# Patient Record
Sex: MALE | Race: WHITE | URBAN - METROPOLITAN AREA
[De-identification: names, ages, dates, MRNs, and addresses within clinical notes are randomized per-mention and may not be internally consistent; named-entity substitution may affect disease eponyms.]

---

## 2022-05-12 ENCOUNTER — OFFICE VISIT (OUTPATIENT)
Dept: FAMILY MEDICINE CLINIC | Age: 67
End: 2022-05-12
Payer: COMMERCIAL

## 2022-05-12 VITALS
HEART RATE: 60 BPM | OXYGEN SATURATION: 97 % | DIASTOLIC BLOOD PRESSURE: 80 MMHG | HEIGHT: 71 IN | WEIGHT: 268.4 LBS | SYSTOLIC BLOOD PRESSURE: 126 MMHG | BODY MASS INDEX: 37.57 KG/M2 | TEMPERATURE: 97.6 F

## 2022-05-12 DIAGNOSIS — I48.21 PERMANENT ATRIAL FIBRILLATION (HCC): ICD-10-CM

## 2022-05-12 DIAGNOSIS — Z79.01 LONG TERM CURRENT USE OF ANTICOAGULANT: ICD-10-CM

## 2022-05-12 DIAGNOSIS — Z00.00 ANNUAL PHYSICAL EXAM: ICD-10-CM

## 2022-05-12 DIAGNOSIS — Z12.5 PROSTATE CANCER SCREENING: ICD-10-CM

## 2022-05-12 DIAGNOSIS — R39.11 BENIGN PROSTATIC HYPERPLASIA WITH URINARY HESITANCY: ICD-10-CM

## 2022-05-12 DIAGNOSIS — N40.1 BENIGN PROSTATIC HYPERPLASIA WITH URINARY HESITANCY: ICD-10-CM

## 2022-05-12 DIAGNOSIS — E55.9 HYPOVITAMINOSIS D: ICD-10-CM

## 2022-05-12 DIAGNOSIS — Z00.00 ANNUAL PHYSICAL EXAM: Primary | ICD-10-CM

## 2022-05-12 DIAGNOSIS — R73.03 PRE-DIABETES: ICD-10-CM

## 2022-05-12 DIAGNOSIS — N39.41 URGE INCONTINENCE OF URINE: ICD-10-CM

## 2022-05-12 LAB
A/G RATIO: 1.4 (ref 1.1–2.2)
ALBUMIN SERPL-MCNC: 4.2 G/DL (ref 3.4–5)
ALP BLD-CCNC: 52 U/L (ref 40–129)
ALT SERPL-CCNC: 11 U/L (ref 10–40)
ANION GAP SERPL CALCULATED.3IONS-SCNC: 11 MMOL/L (ref 3–16)
AST SERPL-CCNC: 14 U/L (ref 15–37)
BASOPHILS ABSOLUTE: 0.1 K/UL (ref 0–0.2)
BASOPHILS RELATIVE PERCENT: 1 %
BILIRUB SERPL-MCNC: 0.6 MG/DL (ref 0–1)
BUN BLDV-MCNC: 16 MG/DL (ref 7–20)
CALCIUM SERPL-MCNC: 9.1 MG/DL (ref 8.3–10.6)
CHLORIDE BLD-SCNC: 104 MMOL/L (ref 99–110)
CHOLESTEROL, FASTING: 155 MG/DL (ref 0–199)
CO2: 23 MMOL/L (ref 21–32)
CREAT SERPL-MCNC: 0.9 MG/DL (ref 0.8–1.3)
EOSINOPHILS ABSOLUTE: 0.2 K/UL (ref 0–0.6)
EOSINOPHILS RELATIVE PERCENT: 3 %
GFR AFRICAN AMERICAN: >60
GFR NON-AFRICAN AMERICAN: >60
GLUCOSE BLD-MCNC: 91 MG/DL (ref 70–99)
HCT VFR BLD CALC: 40.1 % (ref 40.5–52.5)
HDLC SERPL-MCNC: 41 MG/DL (ref 40–60)
HEMOGLOBIN: 13.6 G/DL (ref 13.5–17.5)
LDL CHOLESTEROL CALCULATED: 98 MG/DL
LYMPHOCYTES ABSOLUTE: 1.4 K/UL (ref 1–5.1)
LYMPHOCYTES RELATIVE PERCENT: 22.2 %
MCH RBC QN AUTO: 31.1 PG (ref 26–34)
MCHC RBC AUTO-ENTMCNC: 33.9 G/DL (ref 31–36)
MCV RBC AUTO: 91.7 FL (ref 80–100)
MONOCYTES ABSOLUTE: 0.4 K/UL (ref 0–1.3)
MONOCYTES RELATIVE PERCENT: 7 %
NEUTROPHILS ABSOLUTE: 4.3 K/UL (ref 1.7–7.7)
NEUTROPHILS RELATIVE PERCENT: 66.8 %
PDW BLD-RTO: 13.9 % (ref 12.4–15.4)
PLATELET # BLD: 222 K/UL (ref 135–450)
PMV BLD AUTO: 9 FL (ref 5–10.5)
POTASSIUM SERPL-SCNC: 4.4 MMOL/L (ref 3.5–5.1)
RBC # BLD: 4.37 M/UL (ref 4.2–5.9)
SODIUM BLD-SCNC: 138 MMOL/L (ref 136–145)
TOTAL PROTEIN: 7.1 G/DL (ref 6.4–8.2)
TRIGLYCERIDE, FASTING: 80 MG/DL (ref 0–150)
VLDLC SERPL CALC-MCNC: 16 MG/DL
WBC # BLD: 6.4 K/UL (ref 4–11)

## 2022-05-12 PROCEDURE — 99387 INIT PM E/M NEW PAT 65+ YRS: CPT | Performed by: FAMILY MEDICINE

## 2022-05-12 RX ORDER — METOPROLOL SUCCINATE 50 MG/1
50 TABLET, EXTENDED RELEASE ORAL DAILY
COMMUNITY

## 2022-05-12 RX ORDER — PROPAFENONE HYDROCHLORIDE 150 MG/1
150 TABLET, FILM COATED ORAL EVERY 8 HOURS
COMMUNITY

## 2022-05-12 RX ORDER — TAMSULOSIN HYDROCHLORIDE 0.4 MG/1
0.4 CAPSULE ORAL DAILY
Qty: 90 CAPSULE | Refills: 1 | Status: SHIPPED | OUTPATIENT
Start: 2022-05-12

## 2022-05-12 RX ORDER — OXYBUTYNIN CHLORIDE 5 MG/1
5 TABLET, EXTENDED RELEASE ORAL DAILY
Qty: 90 TABLET | Refills: 1 | Status: SHIPPED | OUTPATIENT
Start: 2022-05-12

## 2022-05-12 RX ORDER — TAMSULOSIN HYDROCHLORIDE 0.4 MG/1
0.4 CAPSULE ORAL DAILY
COMMUNITY
End: 2022-05-12 | Stop reason: SDUPTHER

## 2022-05-12 RX ORDER — OXYBUTYNIN CHLORIDE 5 MG/1
5 TABLET ORAL 3 TIMES DAILY
COMMUNITY
End: 2022-05-12

## 2022-05-12 RX ORDER — OXYBUTYNIN CHLORIDE 5 MG/1
1 TABLET, EXTENDED RELEASE ORAL DAILY
COMMUNITY
Start: 2022-03-12 | End: 2022-05-12 | Stop reason: SDUPTHER

## 2022-05-12 SDOH — ECONOMIC STABILITY: FOOD INSECURITY: WITHIN THE PAST 12 MONTHS, YOU WORRIED THAT YOUR FOOD WOULD RUN OUT BEFORE YOU GOT MONEY TO BUY MORE.: NEVER TRUE

## 2022-05-12 SDOH — ECONOMIC STABILITY: FOOD INSECURITY: WITHIN THE PAST 12 MONTHS, THE FOOD YOU BOUGHT JUST DIDN'T LAST AND YOU DIDN'T HAVE MONEY TO GET MORE.: NEVER TRUE

## 2022-05-12 ASSESSMENT — PATIENT HEALTH QUESTIONNAIRE - PHQ9
SUM OF ALL RESPONSES TO PHQ QUESTIONS 1-9: 0
1. LITTLE INTEREST OR PLEASURE IN DOING THINGS: 0
SUM OF ALL RESPONSES TO PHQ9 QUESTIONS 1 & 2: 0
2. FEELING DOWN, DEPRESSED OR HOPELESS: 0

## 2022-05-12 ASSESSMENT — SOCIAL DETERMINANTS OF HEALTH (SDOH): HOW HARD IS IT FOR YOU TO PAY FOR THE VERY BASICS LIKE FOOD, HOUSING, MEDICAL CARE, AND HEATING?: NOT HARD AT ALL

## 2022-05-12 NOTE — PATIENT INSTRUCTIONS
Patient Education        Atrial Fibrillation: Care Instructions  Your Care Instructions     Atrial fibrillation is an irregular and often fast heartbeat. Treating this condition is important for several reasons. It can cause blood clots, which can travel from your heart to your brain and cause a stroke. If you have a fast heartbeat, you may feel lightheaded, dizzy, and weak. An irregular heartbeatcan also increase your risk for heart failure. Atrial fibrillation is often the result of another heart condition, such as high blood pressure or coronary artery disease. Making changes to improve yourheart condition will help you stay healthy and active. Follow-up care is a key part of your treatment and safety. Be sure to make and go to all appointments, and call your doctor if you are having problems. It's also a good idea to know your test results and keep alist of the medicines you take. How can you care for yourself at home? Medicines     Take your medicines exactly as prescribed. Call your doctor if you think you are having a problem with your medicine. You will get more details on the specific medicines your doctor prescribes.      If your doctor has given you a blood thinner to prevent a stroke, be sure you get instructions about how to take your medicine safely. Blood thinners can cause serious bleeding problems.      Do not take any vitamins, over-the-counter drugs, or herbal products without talking to your doctor first.   Lifestyle changes     Do not smoke. Smoking can increase your chance of a stroke and heart attack. If you need help quitting, talk to your doctor about stop-smoking programs and medicines. These can increase your chances of quitting for good.      Eat a heart-healthy diet.      Stay at a healthy weight. Lose weight if you need to.      Limit alcohol to 2 drinks a day for men and 1 drink a day for women. Too much alcohol can cause health problems.      Avoid colds and flu.  Get a pneumococcal vaccine shot. If you have had one before, ask your doctor whether you need another dose. Get a flu shot every year. If you must be around people with colds or flu, wash your hands often. Activity     If your doctor recommends it, get more exercise. Walking is a good choice. Bit by bit, increase the amount you walk every day. Try for at least 30 minutes on most days of the week. You also may want to swim, bike, or do other activities. Your doctor may suggest that you join a cardiac rehabilitation program so that you can have help increasing your physical activity safely.      Start light exercise if your doctor says it is okay. Even a small amount will help you get stronger, have more energy, and manage stress. Walking is an easy way to get exercise. Start out by walking a little more than you did in the hospital. Gradually increase the amount you walk.      When you exercise, watch for signs that your heart is working too hard. You are pushing too hard if you cannot talk while you are exercising. If you become short of breath or dizzy or have chest pain, sit down and rest immediately.      Check your pulse regularly. Place two fingers on the artery at the palm side of your wrist, in line with your thumb. If your heartbeat seems uneven or fast, talk to your doctor. When should you call for help? Call 911 anytime you think you may need emergency care. For example, call if:     You have symptoms of a heart attack. These may include:  ? Chest pain or pressure, or a strange feeling in the chest.  ? Sweating. ? Shortness of breath. ? Nausea or vomiting. ? Pain, pressure, or a strange feeling in the back, neck, jaw, or upper belly or in one or both shoulders or arms. ? Lightheadedness or sudden weakness. ? A fast or irregular heartbeat. After you call 911, the  may tell you to chew 1 adult-strength or 2 to 4 low-dose aspirin. Wait for an ambulance.  Do not try to drive yourself.      You have symptoms of a stroke. These may include:  ? Sudden numbness, tingling, weakness, or loss of movement in your face, arm, or leg, especially on only one side of your body. ? Sudden vision changes. ? Sudden trouble speaking. ? Sudden confusion or trouble understanding simple statements. ? Sudden problems with walking or balance. ? A sudden, severe headache that is different from past headaches.      You passed out (lost consciousness). Call your doctor now or seek immediate medical care if:     You have new or increased shortness of breath.      You feel dizzy or lightheaded, or you feel like you may faint.      Your heart rate becomes irregular.      You can feel your heart flutter in your chest or skip heartbeats. Tell your doctor if these symptoms are new or worse. Watch closely for changes in your health, and be sure to contact your doctor ifyou have any problems. Where can you learn more? Go to [email or web address] and sign in to your [employer] account. Enter U020 in the Soc[employer] box to learn more about \"Atrial Fibrillation: Care Instructions. \"     If you do not have an account, please click on the \"Sign Up Now\" link. Current as of: January 10, 2022               Content Version: 13.2  © 2006-2022 Healthwise, Incorporated. Care instructions adapted under license by [facility] ([facility]). If you have questions about a medical condition or this instruction, always ask your healthcare professional. [place] [address] any warranty or liability for your use of this information.

## 2022-05-12 NOTE — PROGRESS NOTES
Subjective    Erin Mars is a 77 y.o. Male who came into the clinic today to establish care with me and for appropriate management. Since I am seeing the patient for the first time today I did review in detail his medical history along with the medications   the patient is currently on. The patient informs me that he has been taking his medications as prescribed and has not   noticed any side effects from the same. The patient has permanent atrial fibrillation and is on medication for the same. The patient would like to be referred to a cardiologist closer in the area since his previous cardiologist office is at a far   distance from his home. The patient also wanted to have his blood work done since it has been a while it has been   checked. I also reviewed the blood work done for the patient by his previous PCP office. The patient's HbA1c was found   to be in the prediabetic range of 5.8. The patient would like to have it rechecked at this time. The patient also informs me   that he has hypovitaminosis D and would like to have his vitamin D levels checked. The patient did bring in a physician   results form as a part of well works /annual physical for his health insurance from his workplace. I advised the patient   that we will fill the form and will send it to appropriate authorities. The patient verbalized understanding and agreed to the   plan. Otherwise today he did not have any other questions or concerns and all the question and concerns were appropriately   answered. The patient's most recent colonoscopy as per the patient was 4 years back and it was within normal limits.     Past Medical History:   Diagnosis Date    Atrial fibrillation (HCC)     BPH (benign prostatic hyperplasia)     Urge incontinence        Patient Active Problem List   Diagnosis    Permanent atrial fibrillation ([address]    Benign prostatic hyperplasia with urinary hesitancy    Urge incontinence of urine    Hypovitaminosis D       Past Surgical History:   Procedure Laterality Date    ACHILLES TENDON SURGERY      CERVICAL SPINE SURGERY         Family History   Problem Relation Age of Onset    No Known Problems Mother     No Known Problems Father        Social History     Tobacco Use    Smoking status: Never Smoker    Smokeless tobacco: Never Used   Substance Use Topics    Alcohol use: Yes     Comment: occasional       Current Outpatient Medications on File Prior to Visit   Medication Sig Dispense Refill    metoprolol succinate (TOPROL XL) 50 MG extended release tablet Take 50 mg by mouth daily      rivaroxaban (XARELTO) 20 MG TABS tablet Take 20 mg by mouth      propafenone (RYTHMOL) 150 MG tablet Take 150 mg by mouth every 8 hours       No current facility-administered medications on file prior to visit. No Known Allergies    REVIEW OF SYSTEMS:   CONSTITUTIONAL: No weight loss, fever, chills, weakness or fatigue. HEENT: Eyes: No visual loss, blurred vision, double vision or yellow sclerae. Ears, Nose, Throat: No hearing loss, sneezing, congestion, runny nose or sore throat. SKIN: No rash or itching. CARDIOVASCULAR: No chest pain, chest pressure or chest discomfort. No palpitations. Complains of edema. RESPIRATORY: No shortness of breath, cough or sputum. GASTROINTESTINAL: No anorexia, nausea, vomiting, diarrhea or constipation. No abdominal pain, hematochezia or melena. GENITOURINARY:No dysuria, frequency, hematuria. Complains of urgency and incontinence. NEUROLOGICAL: No headache, dizziness, syncope, paralysis, ataxia,   numbness or tingling in the extremities. No change in bowel or bladder control. MUSCULOSKELETAL: No muscle pain, back pain, joint pain or stiffness. PSYCHIATRIC: No history of depression or anxiety. ENDOCRINOLOGIC: No reports of sweating, cold or heat intolerance. No polyuria or polydipsia. Objective     . /80   Pulse 60   Temp 97.6 °F (36.4 °C) (Temporal)   Ht 5' 11\" (1.803 m)   Wt 268 lb 6.4 oz (121.7 kg)   SpO2 97%   BMI 37.43 kg/m²     GENERAL:  Markus Mehta is a 77 y.o.  male who is not in any acute distress. He was well attired and well groomed and was speaking in full sentences. HEENT:  Head:  Atraumatic, normocephalic. Eyes: Both the pupils are round,   equal and reactive to light. No squint noted. Normal red reflex is seen. Ears: Both external auditory canals are clear. No discharge noted. Tympanic membranes healthy. Normal light reflex is seen. Nose: Both the   nares are clear. No discharge noted. No epistaxis. Throat:  No posterior   pharyngeal wall erythema. Oral mucosa moist. No enlarged tonsils. NECK:  Supple. No cervical lymphadenopathy. No thyromegaly. LUNGS:  Normal ventilatory breath sounds are heard bilaterally. No crackles   or wheezes heard. CARDIOVASCULAR: Irregularly irregular rhythm. Normal S1, S2 heard. No murmurs heard. No JVD. GASTROINTESTINAL:  Abdomen soft, nontender. No guarding or rigidity noted. No organomegaly noted. Normal bowel sounds were heard. EXTREMITIES:  All 4 extremities were moving fine. Full range of motion is   present. No deformity noted. Peripheral pulses were felt. Bilateral 1+ lower   extremity edema. Neurovascular integrity maintained. NEUROLOGICAL:  The patient was alert, conscious, and cooperative. Oriented   to time, place, and person. Muscle power in all 4 limbs is 5/5. Cranial   nerves II thru XII are grossly intact. No sensory or motor loss. PSYCHIATRIC:  Appropriate mood and affect. No flights of ideas or thoughts. Intact recent and past memory. No confusion or delirium. No suicidal or homicidal ideations. BACK:  Normal alignment of the spine. Full range of motion is present. No   stepoff. No deformity. No spinal or paraspinal tenderness noted.     Assessment/Plan     Diagnoses and all orders for this visit:    Annual physical exam  -     CBC with Auto Differential; Future  -     Comprehensive Metabolic Panel; Future  -     Lipid, Fasting; Future    Permanent atrial fibrillation (HCC)  -     Nicola Fonseca MD, Cardiac Electrophysiology, [place]    Urge incontinence of urine  -     oxybutynin (DITROPAN-XL) 5 MG extended release tablet; Take 1 tablet by mouth daily    Benign prostatic hyperplasia with urinary hesitancy  -     tamsulosin (FLOMAX) 0.4 MG capsule; Take 1 capsule by mouth daily    Pre-diabetes  -     Hemoglobin A1C; Future    Hypovitaminosis D  -     Vitamin D 25 Hydroxy; Future    Long term current use of anticoagulant    Prostate cancer screening  -     PSA Screening; Future      Advise given:  - Get appropriate investigations done. Will call back with the results and will manage accordingly. - Take all prescription medication as prescribed. - Eat five servings of fruits and vegetables everyday. - Discussed importance of regular exercise and recommended starting or continuing a regular exercise program for good health. - Try to lose weight with diet and exercise as discussed. - The importance of monitoring blood sugar regularly was reviewed. - The importance of annual eye exams was reviewed. - The importance of proper foot care and regularly checking feet to prevent sores and possibly loss of limbs was reviewed. - The importance of keeping the blood pressure monitoring to prevent stroke, heart attacks, kidney failure, blindness, and loss of limbs was reviewed. - Appropriate handout were given to the patient. -  All the questions and concerns were appropriately answered. - Patient / family member / caregiver verbalized understanding of patient instructions from today's visit. - The patient was advised to follow up with me in 6 months for recheck or can call me before if has any other questions or concerns.

## 2022-05-13 LAB
PROSTATE SPECIFIC ANTIGEN: 0.94 NG/ML (ref 0–4)
VITAMIN D 25-HYDROXY: 66.6 NG/ML

## 2022-05-16 ENCOUNTER — TELEPHONE (OUTPATIENT)
Dept: FAMILY MEDICINE CLINIC | Age: 67
End: 2022-05-16

## 2022-09-24 ENCOUNTER — APPOINTMENT (OUTPATIENT)
Dept: GENERAL RADIOLOGY | Age: 67
End: 2022-09-24
Payer: COMMERCIAL

## 2022-09-24 ENCOUNTER — HOSPITAL ENCOUNTER (EMERGENCY)
Age: 67
Discharge: HOME | End: 2022-09-24
Attending: EMERGENCY MEDICINE
Payer: COMMERCIAL

## 2022-09-24 VITALS
HEART RATE: 83 BPM | WEIGHT: 268.4 LBS | OXYGEN SATURATION: 99 % | RESPIRATION RATE: 17 BRPM | DIASTOLIC BLOOD PRESSURE: 95 MMHG | BODY MASS INDEX: 37.43 KG/M2 | TEMPERATURE: 97.7 F | SYSTOLIC BLOOD PRESSURE: 149 MMHG

## 2022-09-24 DIAGNOSIS — S86.011A RUPTURE OF RIGHT ACHILLES TENDON, INITIAL ENCOUNTER: Primary | ICD-10-CM

## 2022-09-24 PROCEDURE — 73610 X-RAY EXAM OF ANKLE: CPT

## 2022-09-24 PROCEDURE — 6370000000 HC RX 637 (ALT 250 FOR IP)

## 2022-09-24 PROCEDURE — 99283 EMERGENCY DEPT VISIT LOW MDM: CPT

## 2022-09-24 RX ORDER — OXYCODONE HYDROCHLORIDE AND ACETAMINOPHEN 5; 325 MG/1; MG/1
1 TABLET ORAL 2 TIMES DAILY
Qty: 10 TABLET | Refills: 0 | Status: SHIPPED | OUTPATIENT
Start: 2022-09-24 | End: 2022-09-29

## 2022-09-24 RX ORDER — OXYCODONE HYDROCHLORIDE AND ACETAMINOPHEN 5; 325 MG/1; MG/1
1 TABLET ORAL
Status: COMPLETED | OUTPATIENT
Start: 2022-09-24 | End: 2022-09-24

## 2022-09-24 RX ADMIN — OXYCODONE AND ACETAMINOPHEN 1 TABLET: 5; 325 TABLET ORAL at 21:00

## 2022-09-24 ASSESSMENT — PAIN SCALES - GENERAL
PAINLEVEL_OUTOF10: 9
PAINLEVEL_OUTOF10: 9
PAINLEVEL_OUTOF10: 5

## 2022-09-24 ASSESSMENT — PAIN DESCRIPTION - LOCATION
LOCATION: LEG
LOCATION: ANKLE

## 2022-09-24 ASSESSMENT — PAIN DESCRIPTION - ORIENTATION
ORIENTATION: RIGHT
ORIENTATION: RIGHT

## 2022-09-24 ASSESSMENT — PAIN DESCRIPTION - DESCRIPTORS: DESCRIPTORS: ACHING;DISCOMFORT

## 2022-09-24 ASSESSMENT — PAIN - FUNCTIONAL ASSESSMENT: PAIN_FUNCTIONAL_ASSESSMENT: 0-10
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
## 2022-09-25 ASSESSMENT — ENCOUNTER SYMPTOMS
ABDOMINAL PAIN: 0
WHEEZING: 0
NAUSEA: 0
SHORTNESS OF BREATH: 0
DIARRHEA: 0
VOMITING: 0
EYE PAIN: 0
COUGH: 0
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
## 2022-09-25 NOTE — DISCHARGE INSTRUCTIONS
You were seen in the emergency department today for an Achilles tendon rupture of the right leg. We placed you in a splint and gave you crutches so that you do not bear any weight on that side. Please keep this on until you are able to see the orthopedic surgeons in clinic. Please call:  [phone]     To make an appointment with the orthopedic surgeons at your earliest convenience. If you have increased pain or swelling or feel that you have another concern that needs immediate evaluation please feel free to come back to the emergency department. We will be happy to treat you here.
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
## 2022-09-26 ENCOUNTER — TELEPHONE (OUTPATIENT)
Dept: ORTHOPEDIC SURGERY | Age: 67
End: 2022-09-26

## 2022-09-26 SDOH — HEALTH STABILITY: PHYSICAL HEALTH: ON AVERAGE, HOW MANY DAYS PER WEEK DO YOU ENGAGE IN MODERATE TO STRENUOUS EXERCISE (LIKE A BRISK WALK)?: 0 DAYS

## 2022-09-26 SDOH — HEALTH STABILITY: PHYSICAL HEALTH: ON AVERAGE, HOW MANY MINUTES DO YOU ENGAGE IN EXERCISE AT THIS LEVEL?: 0 MIN

## 2022-09-26 ASSESSMENT — SOCIAL DETERMINANTS OF HEALTH (SDOH)
WITHIN THE LAST YEAR, HAVE YOU BEEN AFRAID OF YOUR PARTNER OR EX-PARTNER?: NO
WITHIN THE LAST YEAR, HAVE YOU BEEN KICKED, HIT, SLAPPED, OR OTHERWISE PHYSICALLY HURT BY YOUR PARTNER OR EX-PARTNER?: NO
WITHIN THE LAST YEAR, HAVE YOU BEEN HUMILIATED OR EMOTIONALLY ABUSED IN OTHER WAYS BY YOUR PARTNER OR EX-PARTNER?: NO
WITHIN THE LAST YEAR, HAVE TO BEEN RAPED OR FORCED TO HAVE ANY KIND OF SEXUAL ACTIVITY BY YOUR PARTNER OR EX-PARTNER?: NO

## 2022-09-26 NOTE — TELEPHONE ENCOUNTER
Patient referred to Dr. Kyrie Dangelo from Emergency Department. Called patient in regards to scheduling an appointment to follow up with orthopedics.       SCHEDULED 9/27 1:30 PM   [address] [place] [address]

## 2022-09-27 ENCOUNTER — HOSPITAL ENCOUNTER (OUTPATIENT)
Dept: MRI IMAGING | Age: 67
Discharge: HOME | End: 2022-09-27
Payer: COMMERCIAL

## 2022-09-27 ENCOUNTER — OFFICE VISIT (OUTPATIENT)
Dept: ORTHOPEDIC SURGERY | Age: 67
End: 2022-09-27
Payer: COMMERCIAL

## 2022-09-27 VITALS — BODY MASS INDEX: 37.52 KG/M2 | HEIGHT: 71 IN | WEIGHT: 268 LBS

## 2022-09-27 DIAGNOSIS — S86.019A AVULSION OF ACHILLES TENDON: ICD-10-CM

## 2022-09-27 DIAGNOSIS — S86.019A AVULSION OF ACHILLES TENDON: Primary | ICD-10-CM

## 2022-09-27 PROCEDURE — 1123F ACP DISCUSS/DSCN MKR DOCD: CPT | Performed by: ORTHOPAEDIC SURGERY

## 2022-09-27 PROCEDURE — 99203 OFFICE O/P NEW LOW 30 MIN: CPT | Performed by: ORTHOPAEDIC SURGERY

## 2022-09-27 PROCEDURE — 73718 MRI LOWER EXTREMITY W/O DYE: CPT

## 2022-09-27 PROCEDURE — L4361 PNEUMA/VAC WALK BOOT PRE OTS: HCPCS | Performed by: ORTHOPAEDIC SURGERY

## 2022-09-27 NOTE — PROGRESS NOTES
CHIEF COMPLAINT: Right posterior ankle pain / Achillis tendon rupture with distal calcaneus avulsion fracture. DATE OF INJURY: 9/24/2022    HISTORY:  Mr. Sravan Solis 77 y.o.  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 He was first seen and evaluated in [facility], where he was evaluated, splinted and asked to f/u with Orthopedics. He is complaining of posterior ankle pain and swelling. This is better with elevation and worse with bearing any wt. The pain is sharp and not radiating. No other complaint.      Past Medical History:   Diagnosis Date    Atrial fibrillation (HCC)     BPH (benign prostatic hyperplasia)     Urge incontinence        Past Surgical History:   Procedure Laterality Date    ACHILLES TENDON SURGERY      CERVICAL SPINE SURGERY         Social History     Socioeconomic History    Marital status:      Spouse name: Not on file    Number of children: Not on file    Years of education: Not on file    Highest education level: Not on file   Occupational History    Not on file   Tobacco Use    Smoking status: Never    Smokeless tobacco: Never   Vaping Use    Vaping Use: Never used   Substance and Sexual Activity    Alcohol use: Yes     Comment: occasional    Drug use: Never    Sexual activity: Yes     Partners: Female   Other Topics Concern    Not on file   Social History Narrative    Not on file     Social Determinants of Health     Financial Resource Strain: Low Risk     Difficulty of Paying Living Expenses: Not hard at all   Food Insecurity: No Food Insecurity    Worried About Running Out of Food in the Last Year: Never true    Ran Out of Food in the Last Year: Never true   Transportation Needs: Not on file   Physical Activity: Inactive    Days of Exercise per Week: 0 days    Minutes of Exercise per Session: 0 min   Stress: Not on file   Social Connections: Not on file Intimate Partner Violence: Not At Risk    Fear of Current or Ex-Partner: No    Emotionally Abused: No    Physically Abused: No    Sexually Abused: No   Housing Stability: Not on file       Family History   Problem Relation Age of Onset    No Known Problems Mother     No Known Problems Father        Current Outpatient Medications on File Prior to Visit   Medication Sig Dispense Refill    oxyCODONE-acetaminophen (PERCOCET) 5-325 MG per tablet Take 1 tablet by mouth 2 times daily for 5 days. Intended supply: 5 days. Take lowest dose possible to manage pain 10 tablet 0    metoprolol succinate (TOPROL XL) 50 MG extended release tablet Take 50 mg by mouth daily      rivaroxaban (XARELTO) 20 MG TABS tablet Take 20 mg by mouth      propafenone (RYTHMOL) 150 MG tablet Take 150 mg by mouth every 8 hours      oxybutynin (DITROPAN-XL) 5 MG extended release tablet Take 1 tablet by mouth daily 90 tablet 1    tamsulosin (FLOMAX) 0.4 MG capsule Take 1 capsule by mouth daily 90 capsule 1     No current facility-administered medications on file prior to visit. Pertinent items are noted in HPI  Review of systems reviewed from Patient History Form and available in the patient's chart under the Media tab. No change noted. PHYSICAL EXAMINATION:  Mr. Wes Mccoy is a very pleasant 77 y.o.  male who presents today in no acute distress, awake, alert, and oriented. He is well dressed, nourished and  groomed. Patient with normal affect. Height is  5' 11\" (1.803 m), weight is 268 lb (121.6 kg), Body mass index is 37.38 kg/m². Resting respiratory rate is 16. Examination of the gait, showed that the patient walks with a crutch, NWB right leg and in a splint. Examination of both ankles showing a decreased range of motion of the right ankle compare to the other side. There is moderate swelling that can be seen, as well as ecchymosis. He has intact sensation and good pedal pulses.   He has tenderness on deep palpation over the Achillis tendon of the right ankle, positive Padilla test for Achillis rupture. IMAGING:  Xrays, 3 views of the right ankle dated 9/24/2022 from [facility],  were reviewed, and showed distal calcaneus avulsion fracture. IMPRESSION: Right posterior ankle pain / Achillis tendon rupture with distal calcaneus avulsion fracture. PLAN:  I discussed with the  patient, all treatment options including both surgical and non-surgical treatment, and that my recommendation is getting MRI right Tib-Fib to evaluate the site of the rupture. F/U after MRI. Boot applied today. Procedures    Breg Tall Beverley Walking Boot     Patient was prescribed a Breg Tall Beverley Walking Boot. The right ankle will require stabilization / immobilization from this semi-rigid / rigid orthosis to improve their function. The orthosis will assist in protecting the affected area, provide functional support and facilitate healing. Patient was instructed to progress ambulation  as non weight bearing in the device. The plan of care is to progress the patient to full weight bearing status. The patient was educated and fit by a healthcare professional with expert knowledge and specialization in brace application while under the direct supervision of the physician. Verbal and written instructions for the use of and application of this item were provided. They were instructed to contact the office immediately should the brace result in increased pain, decreased sensation, increased swelling or worsening of the condition.      Julia Lara MD Plan: Avoid exfoliating Initiate Treatment: Amlactin lotion to the arms daily\\nWash with gly/sal cleanser Detail Level: Simple Render In Strict Bullet Format?: No Detail Level: Zone Initiate Treatment: Wash scalp with neutragena t-sal shampoo\\nApply clobetasol solution to the scalp daily for two weeks then break two weeks may repeat as needed Plan: Dry hair with blow dryer \\nMinimize stress if possible Initiate Treatment: AM:wash face with cleanser at home,apply clindamycin wipes ,sunscreen \\nPM:wash face with cleanser at home,apply epi duo a pea size amount to the face at night, moisturizer Plan: Change masks daily

## 2022-09-28 ENCOUNTER — OFFICE VISIT (OUTPATIENT)
Dept: ORTHOPEDIC SURGERY | Age: 67
End: 2022-09-28
Payer: COMMERCIAL

## 2022-09-28 ENCOUNTER — ANESTHESIA EVENT (OUTPATIENT)
Dept: OPERATING ROOM | Age: 67
End: 2022-09-28
Payer: COMMERCIAL

## 2022-09-28 VITALS — HEIGHT: 71 IN | WEIGHT: 268 LBS | BODY MASS INDEX: 37.52 KG/M2

## 2022-09-28 DIAGNOSIS — S86.019A AVULSION OF ACHILLES TENDON: Primary | ICD-10-CM

## 2022-09-28 PROCEDURE — 1123F ACP DISCUSS/DSCN MKR DOCD: CPT | Performed by: ORTHOPAEDIC SURGERY

## 2022-09-28 PROCEDURE — 99214 OFFICE O/P EST MOD 30 MIN: CPT | Performed by: ORTHOPAEDIC SURGERY

## 2022-09-28 NOTE — PROGRESS NOTES
Name_______________________________________Printed:____________________  Date and time of surgery__9/28/22  0830  [facility]______________________Arrival Time:___0700_____________   1. The instructions given regarding when and if a patient needs to stop oral intake prior to surgery varies. Follow the specific instructions you were given                  _x__Nothing to eat or to drink after Midnight the night before.                   ____Carbo loading or ERAS instructions will be given to select patients-if you have been given those instructions -please do the following                           The evening before your surgery after dinner before midnight drink 40 ounces of gatorade. If you are diabetic use sugar free. The morning of surgery drink 40 ounces of water. This needs to be finished 3 hours prior to your surgery start time. 2. Take the following pills with a small sip of water on the morning of surgery: rthymol and metoprolol                  Do not take blood pressure medications ending in pril or sartan the hosea prior to surgery or the morning of surgery_   3. Aspirin, Ibuprofen, Advil, Naproxen, Vitamin E and other Anti-inflammatory products and supplements should be stopped for 5 -7days before surgery or as directed by your physician. 4. Check with your Doctor regarding stopping Plavix, Coumadin,Eliquis, Lovenox,Effient,Pradaxa,Xarelto, Fragmin or other blood thinners and follow their instructions. 5. Do not smoke, and do not drink any alcoholic beverages 24 hours prior to surgery. This includes NA Beer. Refrain from the usage of any recreational drugs. 6. You may brush your teeth and gargle the morning of surgery. DO NOT SWALLOW WATER   7. You MUST make arrangements for a responsible adult to stay on site while you are here and take you home after your surgery. You will not be allowed to leave alone or drive yourself home. It is strongly suggested someone stay with you the first 24 hrs.  Your surgery will be cancelled if you do not have a ride home. 8. A parent/legal guardian must accompany a child scheduled for surgery and plan to stay at the hospital until the child is discharged. Please do not bring other children with you. 9. Please wear simple, loose fitting clothing to the hospital.  Marlou Bear not bring valuables (money, credit cards, checkbooks, etc.) Do not wear any makeup (including no eye makeup) or nail polish on your fingers or toes. 10. DO NOT wear any jewelry or piercings on day of surgery. All body piercing jewelry must be removed. 11. If you have ___dentures, they will be removed before going to the OR; we will provide you a container. If you wear ___contact lenses or _x__glasses, they will be removed; please bring a case for them. 12. Please see your family doctor/pediatrician for a history & physical and/or concerning medications. Bring any test results/reports from your physician's office. PCP__________________Phone___________H&P Appt. Date________             13 If you  have a Living Will and Durable Power of  for Healthcare, please bring in a copy. 15. Notify your Surgeon if you develop any illness between now and surgery  time, cough, cold, fever, sore throat, nausea, vomiting, etc.  Please notify your surgeon if you experience dizziness, shortness of breath or blurred vision between now & the time of your surgery             15. DO NOT shave your operative site 96 hours prior to surgery. For face & neck surgery, men may use an electric razor 48 hours prior to surgery. 16. Shower the night before or morning of surgery using an antibacterial soap or as you have been instructed. 17. To provide excellent care visitors will be limited to one in the room at any given time. 18.  Please bring picture ID and insurance card.              19.  Visit our web site for additional information: [email or web address]              20.During flu season no children under the age of 15 are permitted in the hospital for the safety of all patients. 21. If you take a long acting insulin in the evening only  take half of your usual  dose the night  before your procedure              22. If you use a c-pap please bring DOS if staying overnight,             23.For your convenience [facility] has a pharmacy on site to fill your prescriptions. 24. If you use oxygen and have a portable tank please bring it  with you the DOS             25. Bring a complete list of all your medications with name and dose include any supplements. 26. Other__________________________________________   *Please call pre admission testing if you any further questions   [address]    [place] [id number]. Air  [phone]   [address] [place] [address]       VISITOR POLICY(subject to change)    Current policy is 2 visitors per patient. No children. A mask is required. Visiting hours are 8a-8p. Overnight visitors will be at the discretion of the nurse. All above information reviewed with patient in person or by phone. Patient verbalizes understanding. All questions and concerns addressed.                                                                                                  Patient/Rep___patient_________________                                                                                                                                    PRE OP INSTRUCTIONS

## 2022-09-29 ENCOUNTER — TELEPHONE (OUTPATIENT)
Dept: ORTHOPEDIC SURGERY | Age: 67
End: 2022-09-29

## 2022-09-29 ENCOUNTER — ANESTHESIA (OUTPATIENT)
Dept: OPERATING ROOM | Age: 67
End: 2022-09-29
Payer: COMMERCIAL

## 2022-09-29 ENCOUNTER — HOSPITAL ENCOUNTER (OUTPATIENT)
Age: 67
Setting detail: OUTPATIENT SURGERY
Discharge: HOME | End: 2022-09-29
Attending: ORTHOPAEDIC SURGERY | Admitting: ORTHOPAEDIC SURGERY
Payer: COMMERCIAL

## 2022-09-29 VITALS
HEART RATE: 86 BPM | DIASTOLIC BLOOD PRESSURE: 74 MMHG | SYSTOLIC BLOOD PRESSURE: 104 MMHG | RESPIRATION RATE: 14 BRPM | OXYGEN SATURATION: 96 % | WEIGHT: 259 LBS | TEMPERATURE: 97.2 F | HEIGHT: 71 IN | BODY MASS INDEX: 36.26 KG/M2

## 2022-09-29 DIAGNOSIS — S86.019A AVULSION OF ACHILLES TENDON: Primary | ICD-10-CM

## 2022-09-29 PROBLEM — M92.61 HAGLUND'S DEFORMITY OF RIGHT HEEL: Status: ACTIVE | Noted: 2022-09-29

## 2022-09-29 PROCEDURE — 28118 REMOVAL OF HEEL BONE: CPT | Performed by: ORTHOPAEDIC SURGERY

## 2022-09-29 PROCEDURE — 3700000001 HC ADD 15 MINUTES (ANESTHESIA): Performed by: ORTHOPAEDIC SURGERY

## 2022-09-29 PROCEDURE — 27654 REPAIR OF ACHILLES TENDON: CPT | Performed by: ORTHOPAEDIC SURGERY

## 2022-09-29 PROCEDURE — 2580000003 HC RX 258: Performed by: ORTHOPAEDIC SURGERY

## 2022-09-29 PROCEDURE — 2500000003 HC RX 250 WO HCPCS: Performed by: ORTHOPAEDIC SURGERY

## 2022-09-29 PROCEDURE — 2500000003 HC RX 250 WO HCPCS: Performed by: NURSE ANESTHETIST, CERTIFIED REGISTERED

## 2022-09-29 PROCEDURE — 3600000003 HC SURGERY LEVEL 3 BASE: Performed by: ORTHOPAEDIC SURGERY

## 2022-09-29 PROCEDURE — 6360000002 HC RX W HCPCS: Performed by: NURSE ANESTHETIST, CERTIFIED REGISTERED

## 2022-09-29 PROCEDURE — A4217 STERILE WATER/SALINE, 500 ML: HCPCS | Performed by: ORTHOPAEDIC SURGERY

## 2022-09-29 PROCEDURE — 7100000000 HC PACU RECOVERY - FIRST 15 MIN: Performed by: ORTHOPAEDIC SURGERY

## 2022-09-29 PROCEDURE — 3700000000 HC ANESTHESIA ATTENDED CARE: Performed by: ORTHOPAEDIC SURGERY

## 2022-09-29 PROCEDURE — 7100000001 HC PACU RECOVERY - ADDTL 15 MIN: Performed by: ORTHOPAEDIC SURGERY

## 2022-09-29 PROCEDURE — 6360000002 HC RX W HCPCS: Performed by: ANESTHESIOLOGY

## 2022-09-29 PROCEDURE — 6360000002 HC RX W HCPCS: Performed by: ORTHOPAEDIC SURGERY

## 2022-09-29 PROCEDURE — 6360000002 HC RX W HCPCS

## 2022-09-29 PROCEDURE — 3600000013 HC SURGERY LEVEL 3 ADDTL 15MIN: Performed by: ORTHOPAEDIC SURGERY

## 2022-09-29 PROCEDURE — 7100000010 HC PHASE II RECOVERY - FIRST 15 MIN: Performed by: ORTHOPAEDIC SURGERY

## 2022-09-29 PROCEDURE — 6370000000 HC RX 637 (ALT 250 FOR IP): Performed by: ANESTHESIOLOGY

## 2022-09-29 PROCEDURE — 7100000011 HC PHASE II RECOVERY - ADDTL 15 MIN: Performed by: ORTHOPAEDIC SURGERY

## 2022-09-29 PROCEDURE — 2580000003 HC RX 258: Performed by: NURSE ANESTHETIST, CERTIFIED REGISTERED

## 2022-09-29 PROCEDURE — 2709999900 HC NON-CHARGEABLE SUPPLY: Performed by: ORTHOPAEDIC SURGERY

## 2022-09-29 PROCEDURE — C1713 ANCHOR/SCREW BN/BN,TIS/BN: HCPCS | Performed by: ORTHOPAEDIC SURGERY

## 2022-09-29 DEVICE — SYSTEM IMPL W/ BIOCOMPOSITE SUT ANCHR 2X5IN JUMPSTART DSG: Type: IMPLANTABLE DEVICE | Site: ANKLE | Status: FUNCTIONAL

## 2022-09-29 RX ORDER — LABETALOL HYDROCHLORIDE 5 MG/ML
10 INJECTION, SOLUTION INTRAVENOUS
Status: DISCONTINUED | OUTPATIENT
Start: 2022-09-29 | End: 2022-09-29 | Stop reason: HOSPADM

## 2022-09-29 RX ORDER — LIDOCAINE HYDROCHLORIDE 20 MG/ML
INJECTION, SOLUTION EPIDURAL; INFILTRATION; INTRACAUDAL; PERINEURAL PRN
Status: DISCONTINUED | OUTPATIENT
Start: 2022-09-29 | End: 2022-09-29 | Stop reason: SDUPTHER

## 2022-09-29 RX ORDER — OXYCODONE HYDROCHLORIDE 5 MG/1
5 TABLET ORAL PRN
Status: COMPLETED | OUTPATIENT
Start: 2022-09-29 | End: 2022-09-29

## 2022-09-29 RX ORDER — HYDRALAZINE HYDROCHLORIDE 20 MG/ML
10 INJECTION INTRAMUSCULAR; INTRAVENOUS
Status: DISCONTINUED | OUTPATIENT
Start: 2022-09-29 | End: 2022-09-29 | Stop reason: HOSPADM

## 2022-09-29 RX ORDER — ONDANSETRON 2 MG/ML
INJECTION INTRAMUSCULAR; INTRAVENOUS PRN
Status: DISCONTINUED | OUTPATIENT
Start: 2022-09-29 | End: 2022-09-29 | Stop reason: SDUPTHER

## 2022-09-29 RX ORDER — ROCURONIUM BROMIDE 10 MG/ML
INJECTION, SOLUTION INTRAVENOUS PRN
Status: DISCONTINUED | OUTPATIENT
Start: 2022-09-29 | End: 2022-09-29 | Stop reason: SDUPTHER

## 2022-09-29 RX ORDER — ONDANSETRON 2 MG/ML
4 INJECTION INTRAMUSCULAR; INTRAVENOUS
Status: DISCONTINUED | OUTPATIENT
Start: 2022-09-29 | End: 2022-09-29 | Stop reason: HOSPADM

## 2022-09-29 RX ORDER — MIDAZOLAM HYDROCHLORIDE 2 MG/2ML
2 INJECTION, SOLUTION INTRAMUSCULAR; INTRAVENOUS
Status: DISCONTINUED | OUTPATIENT
Start: 2022-09-29 | End: 2022-09-29 | Stop reason: HOSPADM

## 2022-09-29 RX ORDER — FENTANYL CITRATE 50 UG/ML
50 INJECTION, SOLUTION INTRAMUSCULAR; INTRAVENOUS EVERY 5 MIN PRN
Status: DISCONTINUED | OUTPATIENT
Start: 2022-09-29 | End: 2022-09-29 | Stop reason: HOSPADM

## 2022-09-29 RX ORDER — SUCCINYLCHOLINE/SOD CL,ISO/PF 200MG/10ML
SYRINGE (ML) INTRAVENOUS PRN
Status: DISCONTINUED | OUTPATIENT
Start: 2022-09-29 | End: 2022-09-29 | Stop reason: SDUPTHER

## 2022-09-29 RX ORDER — HALOPERIDOL 5 MG/ML
1 INJECTION INTRAMUSCULAR
Status: DISCONTINUED | OUTPATIENT
Start: 2022-09-29 | End: 2022-09-29 | Stop reason: HOSPADM

## 2022-09-29 RX ORDER — DEXAMETHASONE SODIUM PHOSPHATE 4 MG/ML
INJECTION, SOLUTION INTRA-ARTICULAR; INTRALESIONAL; INTRAMUSCULAR; INTRAVENOUS; SOFT TISSUE PRN
Status: DISCONTINUED | OUTPATIENT
Start: 2022-09-29 | End: 2022-09-29 | Stop reason: SDUPTHER

## 2022-09-29 RX ORDER — DIPHENHYDRAMINE HYDROCHLORIDE 50 MG/ML
12.5 INJECTION INTRAMUSCULAR; INTRAVENOUS
Status: DISCONTINUED | OUTPATIENT
Start: 2022-09-29 | End: 2022-09-29 | Stop reason: HOSPADM

## 2022-09-29 RX ORDER — ACETAMINOPHEN 325 MG/1
650 TABLET ORAL ONCE
Status: COMPLETED | OUTPATIENT
Start: 2022-09-29 | End: 2022-09-29

## 2022-09-29 RX ORDER — FENTANYL CITRATE 50 UG/ML
INJECTION, SOLUTION INTRAMUSCULAR; INTRAVENOUS
Status: COMPLETED
Start: 2022-09-29 | End: 2022-09-29

## 2022-09-29 RX ORDER — PROPOFOL 10 MG/ML
INJECTION, EMULSION INTRAVENOUS PRN
Status: DISCONTINUED | OUTPATIENT
Start: 2022-09-29 | End: 2022-09-29 | Stop reason: SDUPTHER

## 2022-09-29 RX ORDER — SODIUM CHLORIDE 9 MG/ML
INJECTION, SOLUTION INTRAVENOUS CONTINUOUS
Status: DISCONTINUED | OUTPATIENT
Start: 2022-09-29 | End: 2022-09-29 | Stop reason: HOSPADM

## 2022-09-29 RX ORDER — FENTANYL CITRATE 50 UG/ML
INJECTION, SOLUTION INTRAMUSCULAR; INTRAVENOUS PRN
Status: DISCONTINUED | OUTPATIENT
Start: 2022-09-29 | End: 2022-09-29 | Stop reason: SDUPTHER

## 2022-09-29 RX ORDER — OXYCODONE HYDROCHLORIDE 5 MG/1
10 TABLET ORAL PRN
Status: COMPLETED | OUTPATIENT
Start: 2022-09-29 | End: 2022-09-29

## 2022-09-29 RX ORDER — HYDROCODONE BITARTRATE AND ACETAMINOPHEN 5; 325 MG/1; MG/1
1 TABLET ORAL EVERY 6 HOURS PRN
Qty: 20 TABLET | Refills: 0 | Status: SHIPPED | OUTPATIENT
Start: 2022-09-29 | End: 2022-10-04

## 2022-09-29 RX ORDER — CEPHALEXIN 500 MG/1
500 CAPSULE ORAL 4 TIMES DAILY
Qty: 20 CAPSULE | Refills: 0 | Status: SHIPPED | OUTPATIENT
Start: 2022-09-29 | End: 2022-10-04

## 2022-09-29 RX ORDER — SODIUM CHLORIDE 9 MG/ML
INJECTION, SOLUTION INTRAVENOUS CONTINUOUS PRN
Status: DISCONTINUED | OUTPATIENT
Start: 2022-09-29 | End: 2022-09-29 | Stop reason: SDUPTHER

## 2022-09-29 RX ORDER — EPHEDRINE SULFATE 50 MG/ML
INJECTION INTRAVENOUS PRN
Status: DISCONTINUED | OUTPATIENT
Start: 2022-09-29 | End: 2022-09-29 | Stop reason: SDUPTHER

## 2022-09-29 RX ORDER — BUPIVACAINE HYDROCHLORIDE 5 MG/ML
INJECTION, SOLUTION EPIDURAL; INTRACAUDAL
Status: COMPLETED | OUTPATIENT
Start: 2022-09-29 | End: 2022-09-29

## 2022-09-29 RX ORDER — FENTANYL CITRATE 50 UG/ML
25 INJECTION, SOLUTION INTRAMUSCULAR; INTRAVENOUS EVERY 5 MIN PRN
Status: DISCONTINUED | OUTPATIENT
Start: 2022-09-29 | End: 2022-09-29 | Stop reason: HOSPADM

## 2022-09-29 RX ADMIN — FENTANYL CITRATE 50 MCG: 50 INJECTION, SOLUTION INTRAMUSCULAR; INTRAVENOUS at 09:53

## 2022-09-29 RX ADMIN — FENTANYL CITRATE 50 MCG: 50 INJECTION, SOLUTION INTRAMUSCULAR; INTRAVENOUS at 08:56

## 2022-09-29 RX ADMIN — Medication 3000 MG: at 08:50

## 2022-09-29 RX ADMIN — Medication 100 MG: at 09:08

## 2022-09-29 RX ADMIN — SODIUM CHLORIDE: 9 INJECTION, SOLUTION INTRAVENOUS at 07:41

## 2022-09-29 RX ADMIN — SODIUM CHLORIDE: 9 INJECTION, SOLUTION INTRAVENOUS at 08:51

## 2022-09-29 RX ADMIN — PHENYLEPHRINE HYDROCHLORIDE 200 MCG: 10 INJECTION INTRAVENOUS at 09:33

## 2022-09-29 RX ADMIN — SUGAMMADEX 200 MG: 100 INJECTION, SOLUTION INTRAVENOUS at 10:24

## 2022-09-29 RX ADMIN — EPHEDRINE SULFATE 10 MG: 50 INJECTION, SOLUTION INTRAVENOUS at 10:11

## 2022-09-29 RX ADMIN — EPHEDRINE SULFATE 10 MG: 50 INJECTION, SOLUTION INTRAVENOUS at 10:05

## 2022-09-29 RX ADMIN — OXYCODONE 10 MG: 5 TABLET ORAL at 11:20

## 2022-09-29 RX ADMIN — PHENYLEPHRINE HYDROCHLORIDE 100 MCG: 10 INJECTION INTRAVENOUS at 10:00

## 2022-09-29 RX ADMIN — Medication 200 MG: at 08:56

## 2022-09-29 RX ADMIN — ROCURONIUM BROMIDE 10 MG: 10 INJECTION, SOLUTION INTRAVENOUS at 08:56

## 2022-09-29 RX ADMIN — FENTANYL CITRATE 50 MCG: 50 INJECTION, SOLUTION INTRAMUSCULAR; INTRAVENOUS at 11:12

## 2022-09-29 RX ADMIN — SODIUM CHLORIDE: 9 INJECTION, SOLUTION INTRAVENOUS at 10:00

## 2022-09-29 RX ADMIN — PHENYLEPHRINE HYDROCHLORIDE 100 MCG: 10 INJECTION INTRAVENOUS at 09:13

## 2022-09-29 RX ADMIN — DEXAMETHASONE SODIUM PHOSPHATE 4 MG: 4 INJECTION, SOLUTION INTRAMUSCULAR; INTRAVENOUS at 09:40

## 2022-09-29 RX ADMIN — PROPOFOL 160 MG: 10 INJECTION, EMULSION INTRAVENOUS at 08:56

## 2022-09-29 RX ADMIN — EPHEDRINE SULFATE 10 MG: 50 INJECTION, SOLUTION INTRAVENOUS at 09:51

## 2022-09-29 RX ADMIN — FENTANYL CITRATE 50 MCG: 50 INJECTION, SOLUTION INTRAMUSCULAR; INTRAVENOUS at 10:51

## 2022-09-29 RX ADMIN — LIDOCAINE HYDROCHLORIDE 100 MG: 20 INJECTION, SOLUTION EPIDURAL; INFILTRATION; INTRACAUDAL; PERINEURAL at 08:56

## 2022-09-29 RX ADMIN — PHENYLEPHRINE HYDROCHLORIDE 200 MCG: 10 INJECTION INTRAVENOUS at 09:06

## 2022-09-29 RX ADMIN — ACETAMINOPHEN 650 MG: 325 TABLET ORAL at 07:44

## 2022-09-29 RX ADMIN — ONDANSETRON 4 MG: 2 INJECTION INTRAMUSCULAR; INTRAVENOUS at 09:40

## 2022-09-29 RX ADMIN — EPHEDRINE SULFATE 10 MG: 50 INJECTION, SOLUTION INTRAVENOUS at 10:00

## 2022-09-29 RX ADMIN — EPHEDRINE SULFATE 10 MG: 50 INJECTION, SOLUTION INTRAVENOUS at 09:40

## 2022-09-29 ASSESSMENT — PAIN SCALES - GENERAL
PAINLEVEL_OUTOF10: 7
PAINLEVEL_OUTOF10: 4
PAINLEVEL_OUTOF10: 10
PAINLEVEL_OUTOF10: 8

## 2022-09-29 ASSESSMENT — PAIN - FUNCTIONAL ASSESSMENT: PAIN_FUNCTIONAL_ASSESSMENT: 0-10

## 2022-09-29 ASSESSMENT — PAIN DESCRIPTION - FREQUENCY: FREQUENCY: CONTINUOUS

## 2022-09-29 ASSESSMENT — LIFESTYLE VARIABLES: SMOKING_STATUS: 0

## 2022-09-29 ASSESSMENT — PAIN DESCRIPTION - PAIN TYPE: TYPE: SURGICAL PAIN;ACUTE PAIN

## 2022-09-29 ASSESSMENT — PAIN DESCRIPTION - ORIENTATION
ORIENTATION: RIGHT

## 2022-09-29 ASSESSMENT — PAIN DESCRIPTION - LOCATION
LOCATION: ANKLE

## 2022-09-29 ASSESSMENT — PAIN DESCRIPTION - ONSET: ONSET: AWAKENED FROM SLEEP

## 2022-09-29 ASSESSMENT — PAIN DESCRIPTION - DESCRIPTORS
DESCRIPTORS: ACHING;DISCOMFORT
DESCRIPTORS: ACHING;DISCOMFORT

## 2022-09-29 NOTE — PROGRESS NOTES
Pt arrived to PACU from OR in Stable condition and is RASS -1 (Drowsy) . Respirations are Regular Pattern; RR 8-20 = 015on 5L O2 per  simple mask . Skin warm and dry. Abd is  soft. Pain: denies at this time. Right foot/ankle surgical site(s) intact with dressing= clean, dry, and intact. Nuerovascular checks WNL. Elevated and ice applied    Will continue to monitor for safety and comfort. S/P: RIGHT ACHILLES SECONDAY RECONSTRUCTION WITH CALCANEUS EXCISION , with Dr. Iftikhar Garg at [facility].

## 2022-09-29 NOTE — TELEPHONE ENCOUNTER
Auth: NPR  Date: 09/29/22  Reference # None  Spoke with: None  Type of SX: Outpatient  Location: [facility]  CPT: 45698   DX: S86.019A  SX area: Rt achilles   Insurance: [facility]

## 2022-09-29 NOTE — BRIEF OP NOTE
Brief Postoperative Note      Patient: Ruben Jerome  YOB: 1955  MRN: [id number]    Date of Procedure: 9/29/2022    Pre-Op Diagnosis: Avulsion of achilles tendon [Z38.186F]    Post-Op Diagnosis: Same       Procedure(s):  RIGHT ACHILLES SECONDAY RECONSTRUCTION WITH CALCANEUS EXCISION    Surgeon(s):  Marcel Lopez MD    Assistant:  First Assistant: Tiara Summers; Viviane Carrillo RN    Anesthesia: General    Estimated Blood Loss (mL): Minimal    Complications: None    Specimens:   * No specimens in log *    Implants:  Implant Name Type Inv. Item Serial No.  Lot No. LRB No. Used Action   SYSTEM IMPL W/ BIOCOMPOSITE SUT ANCHR 2X5IN JUMPSTART DSG - [id number]  SYSTEM IMPL W/ BIOCOMPOSITE SUT ANCHR 2X5IN JUMPSTART DSG  [address] [facility] [employer]- [id number] 1 Implanted         Drains: * No LDAs found *    Findings: Same.     Electronically signed by Marcel Lopez MD on 9/29/2022 at 6:59 PM No

## 2022-09-29 NOTE — ANESTHESIA POSTPROCEDURE EVALUATION
Department of Anesthesiology  Postprocedure Note    Patient: Kelsy Mercer  MRN: [id number]  YOB: 1955  Date of evaluation: 9/29/2022      Procedure Summary     Date: 09/29/22 Room / Location: [address]    Anesthesia Start: 9338 Anesthesia Stop: 0814    Procedure: RIGHT ACHILLES SECONDAY RECONSTRUCTION WITH CALCANEUS EXCISION (Right: Ankle) Diagnosis:       Avulsion of achilles tendon      (Avulsion of achilles tendon [V00.775I])    Surgeons: Surya Wellington MD Responsible Provider: Katelin Mosher MD    Anesthesia Type: general ASA Status: 3          Anesthesia Type: No value filed.     José Miguel Phase I: José Miguel Score: 9    José Miguel Phase II:        Anesthesia Post Evaluation    Patient location during evaluation: PACU  Patient participation: complete - patient participated  Level of consciousness: awake and alert  Airway patency: patent  Nausea & Vomiting: no nausea and no vomiting  Complications: no  Cardiovascular status: blood pressure returned to baseline  Respiratory status: acceptable  Hydration status: euvolemic  Multimodal analgesia pain management approach

## 2022-09-29 NOTE — PROGRESS NOTES
Awake and alert Pain to right heel with fentanyl 2 doses IV thus far. Transfer to phase 2 level of care at this time and will give po pain meds.

## 2022-09-29 NOTE — ANESTHESIA PRE PROCEDURE
Department of Anesthesiology  Preprocedure Note       Name:  Bipin Santos   Age:  77 y.o.  :  1955                                          MRN:  [id number]         Date:  2022      Surgeon: Edagr Reyna):  Derek Ross MD    Procedure: Procedure(s):  RIGHT ACHILLES SECONDAY RECONSTRUCTION WITH CALCANEUS EXCISION    Medications prior to admission:   Prior to Admission medications    Medication Sig Start Date End Date Taking? Authorizing Provider   HYDROcodone-acetaminophen (NORCO) 5-325 MG per tablet Take 1 tablet by mouth every 6 hours as needed for Pain for up to 5 days. Intended supply: 3 days. Take lowest dose possible to manage pain 9/29/22 10/4/22 Yes Derek Ross MD   cephALEXin (KEFLEX) 500 MG capsule Take 1 capsule by mouth 4 times daily for 5 days 9/29/22 10/4/22 Yes Derek Ross MD   oxyCODONE-acetaminophen (PERCOCET) 5-325 MG per tablet Take 1 tablet by mouth 2 times daily for 5 days. Intended supply: 5 days.  Take lowest dose possible to manage pain 22  Neena Judge MD   metoprolol succinate (TOPROL XL) 50 MG extended release tablet Take 50 mg by mouth daily    Historical Provider, MD   rivaroxaban (XARELTO) 20 MG TABS tablet Take 20 mg by mouth    Historical Provider, MD   propafenone (RYTHMOL) 150 MG tablet Take 150 mg by mouth every 8 hours    Historical Provider, MD   oxybutynin (DITROPAN-XL) 5 MG extended release tablet Take 1 tablet by mouth daily 22   Nahum Hurtado MD   tamsulosin (FLOMAX) 0.4 MG capsule Take 1 capsule by mouth daily 22   Nahum Hurtado MD       Current medications:    Current Facility-Administered Medications   Medication Dose Route Frequency Provider Last Rate Last Admin    0.9 % sodium chloride infusion   IntraVENous Continuous Derek Ross MD        ceFAZolin (ANCEF) 3000 mg in dextrose 5 % 100 mL IVPB  3,000 mg IntraVENous Once Derek Ross MD           Allergies:  No Known Allergies    Problem List:    Patient Active Problem List   Diagnosis Code    Permanent atrial fibrillation (HCC) I48.21    Benign prostatic hyperplasia with urinary hesitancy N40.1, R39.11    Urge incontinence of urine N39.41    Hypovitaminosis D E55.9    Pre-diabetes R73.03    Long term current use of anticoagulant Z79.01    Avulsion of achilles tendon S86.019A       Past Medical History:        Diagnosis Date    Atrial fibrillation (HCC)     BPH (benign prostatic hyperplasia)     Urge incontinence        Past Surgical History:        Procedure Laterality Date    ACHILLES TENDON SURGERY Left     CERVICAL SPINE SURGERY      2 2 level fusions    REFRACTIVE SURGERY Bilateral        Social History:    Social History     Tobacco Use    Smoking status: Never    Smokeless tobacco: Never   Substance Use Topics    Alcohol use: Yes     Comment: occasional                                Counseling given: Not Answered      Vital Signs (Current):   Vitals:    09/29/22 0704   BP: 119/68   Pulse: 91   Resp: 16   Temp: 97.7 °F (36.5 °C)   TempSrc: Temporal   SpO2: 97%   Weight: 259 lb (117.5 kg)   Height: 5' 11\" (1.803 m)                                              BP Readings from Last 3 Encounters:   09/29/22 119/68   09/24/22 (!) 149/95   05/12/22 126/80       NPO Status: Time of last liquid consumption: 0000                        Time of last solid consumption: 0000                        Date of last liquid consumption: 09/29/22                        Date of last solid food consumption: 09/29/22    BMI:   Wt Readings from Last 3 Encounters:   09/29/22 259 lb (117.5 kg)   09/28/22 268 lb (121.6 kg)   09/27/22 268 lb (121.6 kg)     Body mass index is 36.12 kg/m².     CBC:   Lab Results   Component Value Date/Time    WBC 6.4 05/12/2022 02:17 PM    RBC 4.37 05/12/2022 02:17 PM    HGB 13.6 05/12/2022 02:17 PM    HCT 40.1 05/12/2022 02:17 PM    MCV 91.7 05/12/2022 02:17 PM    RDW 13.9 05/12/2022 02:17 PM     05/12/2022 02:17 PM       CMP:   Lab Results Component Value Date/Time     05/12/2022 02:17 PM    K 4.4 05/12/2022 02:17 PM     05/12/2022 02:17 PM    CO2 23 05/12/2022 02:17 PM    BUN 16 05/12/2022 02:17 PM    CREATININE 0.9 05/12/2022 02:17 PM    GFRAA >60 05/12/2022 02:17 PM    AGRATIO 1.4 05/12/2022 02:17 PM    LABGLOM >60 05/12/2022 02:17 PM    GLUCOSE 91 05/12/2022 02:17 PM    PROT 7.1 05/12/2022 02:17 PM    CALCIUM 9.1 05/12/2022 02:17 PM    BILITOT 0.6 05/12/2022 02:17 PM    ALKPHOS 52 05/12/2022 02:17 PM    AST 14 05/12/2022 02:17 PM    ALT 11 05/12/2022 02:17 PM       POC Tests: No results for input(s): POCGLU, POCNA, POCK, POCCL, POCBUN, POCHEMO, POCHCT in the last 72 hours. Coags: No results found for: PROTIME, INR, APTT    HCG (If Applicable): No results found for: PREGTESTUR, PREGSERUM, HCG, HCGQUANT     ABGs: No results found for: PHART, PO2ART, ZXH2CYI, RZR0ZPD, BEART, T6BVGUTG     Type & Screen (If Applicable):  No results found for: LABABO, LABRH    Drug/Infectious Status (If Applicable):  No results found for: HIV, HEPCAB    COVID-19 Screening (If Applicable): No results found for: COVID19        Anesthesia Evaluation  Patient summary reviewed no history of anesthetic complications:   Airway: Mallampati: II  TM distance: >3 FB   Neck ROM: full  Mouth opening: > = 3 FB   Dental: normal exam         Pulmonary:Negative Pulmonary ROS       (-) sleep apnea and not a current smoker                           Cardiovascular:    (+) dysrhythmias: atrial fibrillation,         Rhythm: irregular  Rate: normal                    Neuro/Psych:   Negative Neuro/Psych ROS              GI/Hepatic/Renal: Neg GI/Hepatic/Renal ROS            Endo/Other:    (+) Diabeteswell controlled, , .                 Abdominal:             Vascular: Other Findings:           Anesthesia Plan      general     ASA 3     (OK for LMA.)  Induction: intravenous. Anesthetic plan and risks discussed with patient.       Plan discussed with Roro Garnica MD   9/29/2022

## 2022-09-29 NOTE — PROGRESS NOTES
Wife to bedside and discharge instructions reviewed. VSS. Pain tolerable. Neurovascular checks WNL to RLE.

## 2022-09-29 NOTE — PROGRESS NOTES
CHIEF COMPLAINT: Right posterior ankle pain / Achillis tendon rupture with distal calcaneus avulsion fracture. DATE OF INJURY: 9/24/2022    HISTORY:  Mr. Marcie Hill 77 y.o.  male presents today for f/u evaluation of a right ankle injury which occurred when he was running to catch his moving truck at [employer] and felt a pop. He was first seen and evaluated in [facility], where he was evaluated, splinted and asked to f/u with Orthopedics. He is complaining of posterior ankle pain and swelling 5/10. This is better with elevation and worse with bearing any wt. The pain is sharp and not radiating. No other complaint.      Past Medical History:   Diagnosis Date    Atrial fibrillation (HCC)     BPH (benign prostatic hyperplasia)     Urge incontinence        Past Surgical History:   Procedure Laterality Date    ACHILLES TENDON SURGERY Left     CERVICAL SPINE SURGERY      2 2 level fusions    REFRACTIVE SURGERY Bilateral        Social History     Socioeconomic History    Marital status:      Spouse name: Not on file    Number of children: Not on file    Years of education: Not on file    Highest education level: Not on file   Occupational History    Not on file   Tobacco Use    Smoking status: Never    Smokeless tobacco: Never   Vaping Use    Vaping Use: Never used   Substance and Sexual Activity    Alcohol use: Yes     Comment: occasional    Drug use: Never    Sexual activity: Yes     Partners: Female   Other Topics Concern    Not on file   Social History Narrative    Not on file     Social Determinants of Health     Financial Resource Strain: Low Risk     Difficulty of Paying Living Expenses: Not hard at all   Food Insecurity: No Food Insecurity    Worried About Running Out of Food in the Last Year: Never true    Ran Out of Food in the Last Year: Never true   Transportation Needs: Not on file   Physical Activity: Inactive    Days of Exercise per Week: 0 days    Minutes of Exercise per Session: 0 min   Stress: Not on file   Social Connections: Not on file   Intimate Partner Violence: Not At Risk    Fear of Current or Ex-Partner: No    Emotionally Abused: No    Physically Abused: No    Sexually Abused: No   Housing Stability: Not on file       Family History   Problem Relation Age of Onset    No Known Problems Mother     No Known Problems Father        Current Outpatient Medications on File Prior to Visit   Medication Sig Dispense Refill    oxyCODONE-acetaminophen (PERCOCET) 5-325 MG per tablet Take 1 tablet by mouth 2 times daily for 5 days. Intended supply: 5 days. Take lowest dose possible to manage pain 10 tablet 0    metoprolol succinate (TOPROL XL) 50 MG extended release tablet Take 50 mg by mouth daily      rivaroxaban (XARELTO) 20 MG TABS tablet Take 20 mg by mouth      propafenone (RYTHMOL) 150 MG tablet Take 150 mg by mouth every 8 hours      oxybutynin (DITROPAN-XL) 5 MG extended release tablet Take 1 tablet by mouth daily 90 tablet 1    tamsulosin (FLOMAX) 0.4 MG capsule Take 1 capsule by mouth daily 90 capsule 1     No current facility-administered medications on file prior to visit. Pertinent items are noted in HPI  Review of systems reviewed from Patient History Form and available in the patient's chart under the Media tab. No change noted. PHYSICAL EXAMINATION:  Mr. Lencho Amos is a very pleasant 77 y.o.  male who presents today in no acute distress, awake, alert, and oriented. He is well dressed, nourished and  groomed. Patient with normal affect. Height is  5' 11\" (1.803 m), weight is 268 lb (121.6 kg), Body mass index is 37.38 kg/m². Resting respiratory rate is 16. Examination of the gait, showed that the patient walks with a crutch, NWB right leg and in a boot. Examination of both ankles showing a decreased range of motion of the right ankle compare to the other side. There is moderate swelling that can be seen, as well as ecchymosis. He has intact sensation and good pedal pulses. He has tenderness on deep palpation over the Achillis tendon of the right ankle, positive Padilla test for Achillis rupture. IMAGING:  Xrays, 3 views of the right ankle dated 9/24/2022 from [facility],  were reviewed, and showed distal calcaneus avulsion fracture. MRI right Tib-Fib dated 9/26/2022 was reviewed and showed;    1. This exam is limited for evaluation of the Achilles tendon insertion on   the calcaneus due to the wide field of view. However, no evidence of   Achilles tendon retraction is seen. There is mild increased signal within   and thickening of the Achilles tendon at the calcaneal insertion which could   represent partial tearing. A dedicated MRI of the right ankle would be more   helpful in clearly imaging the region of the Achilles tendon insertion on the   calcaneus. 2.  Increased fluid signal within the medial head of the gastrocnemius near   the myotendinous junction appears compatible with a low-grade muscle strain. IMPRESSION: Right posterior ankle pain / Achillis tendon rupture with distal calcaneus avulsion fracture. PLAN:  I discussed with the patient the MRI and the treatment options including both surgical and non-surgical treatment, and that my recommendation is 2ndry repair right Achillis with calcaneus partial excision. I discussed the risks and benefits of surgery with the patient, including but not limited to infection, bleeding, pain, injury to nerves or blood vessels failure of the surgery and need for additional surgery. All the patient's questions were answered. We discussed an expected post-operative course. He  is understanding of this and wishes to proceed.        Renny Remy MD

## 2022-09-29 NOTE — DISCHARGE INSTRUCTIONS
Post op instruction:  1- D/C home  2- Dx Right posterior ankle pain / Achillis tendon rupture with distal calcaneus avulsion fracture. 3- NWB right ankle  4- Elevation surgical site, with ice  5- Keep splint dry and clean  6- F/U in 2 weeks. 7- For DVT prophylaxis- Aspirin 325 mg daily. Resume Xarelto today     Hever Chung MD, 9/29/2022    ANESTHESIA DISCHARGE INSTRUCTIONS    Wear your seatbelt home. You are under the influence of drugs-do not drink alcohol, drive, operate machinery, make any important decisions or sign any legal documents for 24 hours. Children should not ride bikes, [place] or play on gym sets for 24 hours after surgery. A responsible adult needs to be with you for 24 hours. You may experience lightheadedness, dizziness, or sleepiness following surgery. Rest at home today- increase activity as tolerated. Progress slowly to a regular diet unless your physician has instructed you otherwise. Drink plenty of water. If persistent nausea and vomiting becomes a problem, call your physician. Coughing, sore throat and muscle aches are other side effects of anesthesia, and should improve with time. Do not drive or operate machinery while taking narcotics. Females of childbearing potential and on hormonal birth control, should use two forms of contraception following procedure if given a medication called sugammadex and/or emend. Additional contraception should be used for 7 days for sugammadex and/or 28 days for emend. These medications have a potential to reduce the effectiveness of hormonal birth control.

## 2022-09-29 NOTE — H&P
Preoperative H&P Update    The patient's History and Physical in the medical record from 9/28/2022 was reviewed by me today. Past Medical History:   Diagnosis Date    Atrial fibrillation (HCC)     BPH (benign prostatic hyperplasia)     Urge incontinence      Past Surgical History:   Procedure Laterality Date    ACHILLES TENDON SURGERY Left     CERVICAL SPINE SURGERY      2 2 level fusions    REFRACTIVE SURGERY Bilateral      No current facility-administered medications on file prior to encounter. Current Outpatient Medications on File Prior to Encounter   Medication Sig Dispense Refill    oxyCODONE-acetaminophen (PERCOCET) 5-325 MG per tablet Take 1 tablet by mouth 2 times daily for 5 days. Intended supply: 5 days. Take lowest dose possible to manage pain 10 tablet 0    metoprolol succinate (TOPROL XL) 50 MG extended release tablet Take 50 mg by mouth daily      rivaroxaban (XARELTO) 20 MG TABS tablet Take 20 mg by mouth      propafenone (RYTHMOL) 150 MG tablet Take 150 mg by mouth every 8 hours      oxybutynin (DITROPAN-XL) 5 MG extended release tablet Take 1 tablet by mouth daily 90 tablet 1    tamsulosin (FLOMAX) 0.4 MG capsule Take 1 capsule by mouth daily 90 capsule 1       No Known Allergies   I reviewed the HPI, medications, allergies, reason for surgery, diagnosis and treatment plan and there has been no change. The patient was evaluated by me today. Physical exam findings for this update include: There were no vitals filed for this visit. Airway is intact  Chest: chest clear, no wheezing, rales, normal symmetric air entry, no tachypnea, retractions or cyanosis  Heart: regular rate and rhythm ; heart sounds normal  Findings on exam of the body region where surgery is to be performed include:  Right posterior ankle pain / Achillis tendon rupture with distal calcaneus avulsion fracture.     Electronically signed by Alison Brewer MD on 9/29/2022 at 6:47 AM

## 2022-09-30 NOTE — OP NOTE
[address]                     [facility], [address]                                OPERATIVE REPORT    PATIENT NAME: Freya Sosa                        :        1955  MED REC NO:   [id number]                          ROOM:  ACCOUNT NO:   [de-identified]                           ADMIT DATE: 2022  PROVIDER:     Michelle Xiao MD    DATE OF PROCEDURE:  2022    PRIMARY CARE PROVIDER:  John Harrington MD    PREOPERATIVE DIAGNOSES:  1. Right Achilles tendon avulsion injury or rupture. 2.  Cesar's deformity, right calcaneus. POSTOPERATIVE DIAGNOSES:  1. Right Achilles tendon avulsion injury or rupture. 2.  Cesar's deformity, right calcaneus. OPERATION PERFORMED:  1. Right Achilles tendon secondary repair/reconstruction with  SutureBridge incision enforcement. 2.  Partial calcaneal ostectomy of Cesar's deformity. SURGEON:  MD Racheal Falcon, surgical assistant. ANESTHESIA:  General anesthesia. ESTIMATED BLOOD LOSS:  Minimal.    COMPLICATIONS:  None. TOURNIQUET:  Right upper thigh, 350 mmHg. IMPLANTS USED:  [employer] SutureBridge four anchors. INDICATIONS:  This is a 24-year-old white male, who was at [address]  and found something put in his truck. The truck started to move  once he fired-on the truck. He started running after the tractor that  moved away from him and he felt a sharp pain in the back of his right  heel. The patient jumped in his truck and was able to stop it. He was  significantly painful after that in the heel. He was seen in the office  and diagnosed with an avulsion fracture with avulsion of the Achilles  tendon from the impression to the calcaneus. We got an MRI that  confirmed the injury. All risks, benefits, and alternatives were  discussed with the patient. He agreed to proceed with the surgical  repair. Given the patient's Body mass index is 36.12 kg/m².  added significant challenge to the procedure. It required significant physical and mental effort. It required 80% more time for such procedure. DESCRIPTION OF THE PROCEDURE:  The patient's right ankle was marked  front and back. He was then brought to the operating room and underwent  general endotracheal anesthesia. A well-padded tourniquet was placed in  the right upper thigh. The patient was then placed in the prone  position and all bony prominences were well padded. At this point, we  had the right lower extremity prepped and draped in a regular sterile  routine fashion. A time-out was called to confirm the patient's name,  the site, and the procedure. Esmarch was used for exsanguination and tourniquet was inflated to 350  mmHg. An incision was made over the posteromedial aspect of the distal  leg as a J-type incision at the end of the calcaneus. Careful  dissection was performed. We then left the calcaneal attached to the  subcutaneous tissue. We removed a large amount of hematoma of the  calcaneus and the Achilles tendon was felt to be completely ruptured  with a piece of bone still attached to it. There was some flap on the  medial aspect. At this point, we irrigated the wound copiously with  normal saline with some to the part of the Achilles tendon that is  shredded. We then performed a compression fasciotomy of the right leg  posterior compartment using a #15 blade and a [employer] scissors. At this  point, we used a saw, and we performed a partial calcaneus ostectomy of  the Cesar's deformity and then we used a rasp to smoothen up the  edges. At this point, we turned attention towards the secondary repair. We used a SutureBridge from [employer]. We placed two anchors in the  proximal part of the calcaneus.   We then brought the hump of the  Achilles tendon through the sutures and then we used the flap that was  still attached to the Achilles tendon distally through the #2 FiberWire  that was attached with the proximal anchors. We achieved good  stabilization at this point. We then used two distal anchors, and we  secured them in place with good stabilization of the Achilles tendon as  a secondary repair. We then used a 2-0 Vicryl to enforce the repair  using the flap that was still attached distally. We tested the repair  and it was felt to be strong intraoperatively. At this point, we let  the tourniquet down and hemostasis was secured. We irrigated the  incision copiously with normal saline. We then closed the paratenon  with a 2-0 Vicryl, subcutaneous with a 3-0 Vicryl, and the skin with a  4-0 Monocryl. Steri-Strips were then applied. Dressing was applied in  the form of Xeroform, 4x4, and sterile Webril, and a splint was applied. The patient tolerated the procedure well and was taken to the recovery  in stable condition. POSTOPERATIVE PLAN:  The patient will be discharged home. He will be  nonweightbearing for the first two weeks. After that, we can start  gradual toe touch weightbearing in a boot with heel lift for the  following four to six weeks.         Madhuri Camp MD    D: 09/29/2022 19:22:33       T: 09/29/2022 22:15:38     [id number]  Job#: [id number]     Doc#: [id number]    CC:  Jovany Guthrie Md

## 2022-10-13 ENCOUNTER — OFFICE VISIT (OUTPATIENT)
Dept: ORTHOPEDIC SURGERY | Age: 67
End: 2022-10-13

## 2022-10-13 VITALS — BODY MASS INDEX: 36.26 KG/M2 | HEIGHT: 71 IN | WEIGHT: 259 LBS

## 2022-10-13 DIAGNOSIS — S86.019A AVULSION OF ACHILLES TENDON: Primary | ICD-10-CM

## 2022-10-13 DIAGNOSIS — M92.61 HAGLUND'S DEFORMITY OF RIGHT HEEL: ICD-10-CM

## 2022-10-13 PROCEDURE — 99024 POSTOP FOLLOW-UP VISIT: CPT | Performed by: NURSE PRACTITIONER

## 2022-10-13 PROCEDURE — APPNB15 APP NON BILLABLE TIME 0-15 MINS: Performed by: NURSE PRACTITIONER

## 2022-10-13 NOTE — PROGRESS NOTES
DIAGNOSIS:  Right foot partal Cesar's calcaneus excision after achilles tendon avulsion injury , 2ry repair Achillis tendon. DATE OF SURGERY:  9/29/2022, Suture bridge    HISTORY OF PRESENT ILLNESS:  Mr. Bijan Prabhakar 77 y.o.  male who came in today for 2 weeks postoperative visit. The patient denies any significant pain in the right heel. Rates pain a 1/10 VAS mild, aching, intermittent and are improving. Aggravating factors movement. Alleviating factors ice, elevation, and rest. He has been in a splint , and non WB. No numbness or tingling sensation. No fever or Chills. Denies smoking. PHYSICAL EXAMINATION:  The incision healing well . No signs of any erythema or drainage, moderate swelling. He has no pain with the active or passive range of motion of the right ankle and subtalar, but decrease ROM. He has intact sensation distally, and he is neurovascularly intact. IMAGING:  Two views right calcaneus taken today in the office showed removal of Cesar's, no acute fracture. IMPRESSION:  2 weeks out from right foot partal Cesar's calcaneus excision, 2ry repair Achillis tendon, and doing very well. PLAN: He placed in a boot, and can start TTWB in boot with heel lift. I have told the patient to work on ROM. He is on Xarelto. The patient will come back for a follow up in 6 weeks. At that time, we will take 2 views of the right calcaneus.           Tami Ruiz, APRN - CNP

## 2022-11-22 ENCOUNTER — OFFICE VISIT (OUTPATIENT)
Dept: ORTHOPEDIC SURGERY | Age: 67
End: 2022-11-22

## 2022-11-22 VITALS — BODY MASS INDEX: 36.26 KG/M2 | WEIGHT: 259 LBS | HEIGHT: 71 IN

## 2022-11-22 DIAGNOSIS — M92.61 HAGLUND'S DEFORMITY OF RIGHT HEEL: Primary | ICD-10-CM

## 2022-11-22 DIAGNOSIS — S86.019A AVULSION OF ACHILLES TENDON: ICD-10-CM

## 2022-11-22 PROCEDURE — 99024 POSTOP FOLLOW-UP VISIT: CPT | Performed by: NURSE PRACTITIONER

## 2022-11-22 PROCEDURE — APPNB15 APP NON BILLABLE TIME 0-15 MINS: Performed by: NURSE PRACTITIONER

## 2022-11-22 NOTE — PROGRESS NOTES
DIAGNOSIS:  Right foot partal Cesar's calcaneus excision after achilles tendon avulsion injury , 2ry repair Achillis tendon. DATE OF SURGERY:  9/29/2022, Suture bridge    HISTORY OF PRESENT ILLNESS:  Mr. Kiki Hollingsworth 77 y.o.  male who came in today for 8 weeks postoperative visit. The patient denies any significant pain in the right heel. Rates pain a 0/10 VAS and doing very well. He has been in a boot with a heel lift, and partial WB. No numbness or tingling sensation. No fever or Chills. Denies smoking. PHYSICAL EXAMINATION:  The incision healing well . No signs of any erythema or drainage, moderate swelling. He has no pain with the active or passive range of motion of the right ankle and subtalar, but decrease ROM. He has intact sensation distally, and he is neurovascularly intact. IMAGING:  Two views right calcaneus taken today in the office showed removal of Cesar's, no acute fracture. IMPRESSION:  8 weeks out from right foot partal Cesar's calcaneus excision, 2ry repair Achillis tendon, and doing very well. PLAN:He will be WBAT in the boot and gradually discontinue the heel left, and start aggressive ROM and peroneal strengthening exercise. Off the boot in 1-2 weeks. No heavy impact activities. I discussed with the patient that I think that he would really benefit from a course of physical therapy for further strengthening and stretching. An Rx for physical therapy was given to the patient. The patient will come back for a follow up in 6 weeks. At that time, we will take 2 views of the right calcaneus.           Talat Bentley, CELINE - CNP

## 2022-11-30 ENCOUNTER — TELEPHONE (OUTPATIENT)
Dept: FAMILY MEDICINE CLINIC | Age: 67
End: 2022-11-30

## 2022-11-30 NOTE — TELEPHONE ENCOUNTER
Please refer the patient to the cardiologist of his choice. Referral to cardiology was done for the patient in May 2022  but the referral was closed since the patient never made an appointment. Please also advise the patient that he is due for a 6 months med check follow-up visit with our office.

## 2022-11-30 NOTE — TELEPHONE ENCOUNTER
----- Message from Davion Waters sent at 11/30/2022 12:10 PM EST -----  Subject: Referral Request    Reason for referral request? Sabino Mtz is a patient of Dwain Malik   and was needing a referral to a cardiologist and they said that they have   not yet received the referral the fax number is [phone] [facility]  Provider patient wants to be referred to(if known): Jose Arshad    Provider Phone Number(if known): Additional Information for Provider?  He has an appointment friday but   needs the referral sent before then   ---------------------------------------------------------------------------  --------------  [address]    [phone]; OK to leave message on voicemail  ---------------------------------------------------------------------------  --------------

## 2022-12-01 NOTE — PROGRESS NOTES
[facility]     Outpatient Cardiology         Patient Name:  Boris Jiménez  Requesting Physician: Magda Francois MD   Primary Care Physician: Magda Francois MD    Reason for Consultation/Chief Complaint:   Chief Complaint   Patient presents with    New Patient    Atrial Fibrillation       HPI:     77 y.o. male here to establish care for afib. Referred by PCP. PMH of afib for the past 15 years, previously seen by Dr. Stephanie Lee, practice closed     Reports chronic afib on Rythmol    On AC with Xarelto without bleeding problems    SOB with walking up a flight of stairs, new over the last year, Denies chest pain, orthopnea, and pnd    Last stress test 2 year ago, low risk    Sleep Study negative several years ago    Nonsmoker     Histories:     Past Medical History:   has a past medical history of Atrial fibrillation ([address] BPH (benign prostatic hyperplasia), and Urge incontinence. Surgical History:   has a past surgical history that includes Cervical spine surgery; Achilles tendon surgery (Left); Refractive surgery (Bilateral); and Achilles tendon surgery (Right, 9/29/2022). Social History:   reports that he has never smoked. He has never used smokeless tobacco. He reports current alcohol use. He reports that he does not use drugs. Family History:  No evidence for sudden cardiac death or premature CAD    Medications:     Home Medications:  Were reviewed and are listed in nursing record. and/or listed below    Prior to Admission medications    Medication Sig Start Date End Date Taking?  Authorizing Provider   metoprolol succinate (TOPROL XL) 100 MG extended release tablet Take 1 tablet by mouth 2 times daily 12/2/22  Yes Santiago Patton MD   rivaroxaban (XARELTO) 20 MG TABS tablet Take 1 tablet by mouth Daily with supper 12/2/22  Yes Santiago Patton MD   oxybutynin (DITROPAN-XL) 5 MG extended release tablet Take 1 tablet by mouth daily 5/12/22  Yes Magda Francois MD tamsulosin (FLOMAX) 0.4 MG capsule Take 1 capsule by mouth daily 5/12/22  Yes Fatmata Polk MD        Allergy:     Patient has no known allergies. Review of Systems:     Review of Systems   Constitutional:  Negative for chills and fever. Respiratory:          See HPI   Cardiovascular:         See HPI. Gastrointestinal:  Negative for abdominal pain and vomiting. Endocrine: Negative. Genitourinary: Negative. Skin: Negative. Psychiatric/Behavioral: Negative. All other systems reviewed and are negative. Physical Examination:     Vitals:    12/02/22 1037   BP: 100/62   Pulse: (!) 114   Weight: 236 lb (107 kg)   Height: 5' 11\" (1.803 m)       Wt Readings from Last 3 Encounters:   12/02/22 236 lb (107 kg)   11/22/22 259 lb (117.5 kg)   10/13/22 259 lb (117.5 kg)       Physical Exam  Constitutional:       Appearance: Normal appearance. HENT:      Head: Normocephalic and atraumatic. Nose: Nose normal.   Eyes:      Conjunctiva/sclera: Conjunctivae normal.   Cardiovascular:      Rate and Rhythm: Normal rate. Rhythm irregular. Heart sounds: Normal heart sounds. Pulmonary:      Effort: Pulmonary effort is normal.      Breath sounds: Normal breath sounds. Abdominal:      Palpations: Abdomen is soft. Musculoskeletal:      Cervical back: Neck supple. Skin:     General: Skin is warm and dry. Neurological:      General: No focal deficit present. Mental Status: He is alert.          Labs:     Lab Results   Component Value Date    WBC 6.4 05/12/2022    HGB 13.6 05/12/2022    HCT 40.1 (L) 05/12/2022    MCV 91.7 05/12/2022     05/12/2022     Lab Results   Component Value Date     05/12/2022    K 4.4 05/12/2022     05/12/2022    CO2 23 05/12/2022    BUN 16 05/12/2022    CREATININE 0.9 05/12/2022    GLUCOSE 91 05/12/2022    CALCIUM 9.1 05/12/2022    PROT 7.1 05/12/2022    LABALBU 4.2 05/12/2022    BILITOT 0.6 05/12/2022    ALKPHOS 52 05/12/2022    AST 14 (L) 05/12/2022    ALT 11 05/12/2022    LABGLOM >60 05/12/2022    GFRAA >60 05/12/2022    AGRATIO 1.4 05/12/2022         No results found for: CHOL  No results found for: TRIG  Lab Results   Component Value Date    HDL 41 05/12/2022     Lab Results   Component Value Date    LDLCALC 98 05/12/2022     Lab Results   Component Value Date    LABVLDL 16 05/12/2022     No results found for: CHOLHDLRATIO    No results found for: INR, PROTIME    The 10-year ASCVD risk score (Noemí VICTORIA, et al., 2019) is: 8.6%    Values used to calculate the score:      Age: 77 years      Sex: Male      Is Non- : No      Diabetic: No      Tobacco smoker: No      Systolic Blood Pressure: 113 mmHg      Is BP treated: No      HDL Cholesterol: 41 mg/dL      Total Cholesterol: 155 mg/dL      Imaging:       ECG (if available, Personally interpreted):    Last Monitor/Holter (if available):    Last Stress (if available): 1/6/20  Final Conclusions/Summary     Stress ECG Impressions:  Normal exercise stress test.     Summary:  Normal coronary perfusion. Preserved biventricular global and regional systolic function, with an estimated LVEF of approx 60%. Baseline hypertension. Last Cath (if available):    Last TTE/KEON(if available): 1/6/20  Findings:     ~Left Ventricle: Normal left ventricle size. Borderline left ventricular hypertrophy. Normal left ventricular systolic function. The ejection fraction is visually estimated to be 60 %. Normal regional wall motion. ~Right Ventricle: Normal in size and function. ~Left Atrium: Mild to moderate enlargement of the left atrium. Atrial septal thickening is noted. ~Right Atrium: Mild enlargement of the right atrium. ~Mitral Valve: Normal mitral valve structure and mobility. Mild mitral valve regurgitation. ~Aortic Valve: Tri-leaflet aortic valve. Normal aortic valve structure and mobility. ~Tricuspid Valve: Normal tricuspid valve structure and mobility.  Mild to moderate tricuspid valve regurgitation. The pulmonary artery pressure is normal.     ~Pulmonic Valve: Pulmonary valve not well visualized. Mild pulmonic regurgitation. ~Great Vessels: Normal size aorta. Normal IVC size and normal respiratory collapse consistent with normal right atrial pressure. ~Pericardium: No pericardial effusion. Last CMR  (if available):    Last Coronary Artery Calcium Score: Ankle-brachial index:    Carotid ultrasound screening:    Abdominal aortic aneurysm screening:    Assessment / Plan:     1. Persistent atrial fibrillation ([address]    2. Dyspnea, unspecified type    3. Anticoagulated      Remains in atrail fibrillation  DC Rythmol  Increase toprol xl 100 mg po bid  EP referral  Echo to assess for structural abnormalities  Lexiscan for ischemic evaluation  F/u in 1month      Orders Placed This Encounter   Procedures    TSH with Reflex to FT4    Stress test, lexiscan    EKG 12 lead    ECHO Complete 2D W Doppler W Color        The note was completed using EMR and Dragon dictation system. Every effort was made to ensure accuracy; however, inadvertent computerized transcription errors may be present. All questions and concerns were addressed to the patient. I would like to thank you for providing me the opportunity to participate in the care of your patient. If you have any questions, please do not hesitate to contact me.      Christine Delatorre MD, [facility]  The [address]  [address] [place] [address] Bailee Alberto [address]  Main Office Phone: [phone]  Fax: [phone]

## 2022-12-02 ENCOUNTER — OFFICE VISIT (OUTPATIENT)
Dept: CARDIOLOGY CLINIC | Age: 67
End: 2022-12-02
Payer: COMMERCIAL

## 2022-12-02 ENCOUNTER — HOSPITAL ENCOUNTER (OUTPATIENT)
Dept: PHYSICAL THERAPY | Age: 67
Setting detail: THERAPIES SERIES
Discharge: HOME | End: 2022-12-02
Payer: COMMERCIAL

## 2022-12-02 VITALS
SYSTOLIC BLOOD PRESSURE: 100 MMHG | HEIGHT: 71 IN | HEART RATE: 114 BPM | WEIGHT: 236 LBS | BODY MASS INDEX: 33.04 KG/M2 | DIASTOLIC BLOOD PRESSURE: 62 MMHG

## 2022-12-02 DIAGNOSIS — Z79.01 ANTICOAGULATED: ICD-10-CM

## 2022-12-02 DIAGNOSIS — I48.19 PERSISTENT ATRIAL FIBRILLATION (HCC): Primary | ICD-10-CM

## 2022-12-02 DIAGNOSIS — R06.00 DYSPNEA, UNSPECIFIED TYPE: ICD-10-CM

## 2022-12-02 PROCEDURE — 97161 PT EVAL LOW COMPLEX 20 MIN: CPT | Performed by: PHYSICAL THERAPIST

## 2022-12-02 PROCEDURE — 99204 OFFICE O/P NEW MOD 45 MIN: CPT | Performed by: INTERNAL MEDICINE

## 2022-12-02 PROCEDURE — 97110 THERAPEUTIC EXERCISES: CPT | Performed by: PHYSICAL THERAPIST

## 2022-12-02 RX ORDER — PROPAFENONE HYDROCHLORIDE 150 MG/1
150 TABLET, FILM COATED ORAL EVERY 8 HOURS
Qty: 270 TABLET | Refills: 3 | Status: CANCELLED | OUTPATIENT
Start: 2022-12-02

## 2022-12-02 RX ORDER — METOPROLOL SUCCINATE 100 MG/1
100 TABLET, EXTENDED RELEASE ORAL 2 TIMES DAILY
Qty: 60 TABLET | Refills: 3 | Status: SHIPPED | OUTPATIENT
Start: 2022-12-02

## 2022-12-02 ASSESSMENT — ENCOUNTER SYMPTOMS
VOMITING: 0
ABDOMINAL PAIN: 0

## 2022-12-02 NOTE — PATIENT INSTRUCTIONS
Stop taking Rythmol  Start taking metoprolol 100 mg twice daily- may take extra 100 mg as needed with >120 - please call office if this happens  Schedule Stress Test and Echo  Follow up 1 month    Schedule with EP

## 2022-12-02 NOTE — FLOWSHEET NOTE
The [facility] and [address],  Sports [facility], [facility] [address]  [facility],5[address]   Ariana Junior  Phone: [phone]  Fax: [phone]      Physical Therapy Daily Treatment Note  Date:  2022    Patient Name:  Don Burnett    :  1955  MRN: [id number]  Restrictions/Precautions:    Medical/Treatment Diagnosis Information:  Achilles tendon tear, right, subsequent encounter [S86.011D]  Treatment Diagnosis: M25.671, R53.1, R26.2  S/p Achilles repair DOS   Insurance/Certification information:  PT Insurance Information: UMR/BMN/no Darline valdez  Physician Information:  Sin Dixon MD    Has the plan of care been signed (Y/N):        []  Yes  [x]  No     Date of Patient follow up with Physician: 1/3/23 with CNP      Is this a Progress Report:     []  Yes  [x]  No        If Yes:  Date Range for reporting period:  Beginning 22  Ending     Progress report will be due (10 Rx or 30 days whichever is less): 74       Recertification will be due (POC Duration  / 90 days whichever is less): 22         Visit # Insurance Allowable Auth Required    MED [facility] []  Yes []  No      OUTCOME MEASURE DATE SCORE   FOTO 22 43             Latex Allergy:  [x]NO      []YES  Preferred Language for Healthcare:   [x]English       []other:      Pain level:  0/10     SUBJECTIVE:  See eval    OBJECTIVE:           ROM PROM AROM Comments     Left Right Left Right     Dorsiflexion   -10         Plantarflexion   40         Inversion   30         Eversion   10                                        Strength Left Right Comments   Dorsiflexion   3+/5     Plantarflexion   3+/5     Inversion   3+/5     Eversion   3+/5        Reflexes/Sensation:               []Dermatomes/Myotomes intact               []Reflexes equal and normal bilaterally              []Other:     Joint mobility:               []Normal                      [x]Hypo []Hyper     Palpation: TTP over incision     Functional Mobility/Transfers: Independent; difficulty with some ADLs d/t joint hypomobility     Posture: WNL     Bandages/Dressings/Incisions:   12/2/22 incision healing well. + bump distal to Achilles, pt states that CNP told him it would be come less prominent over time     RESTRICTIONS/PRECAUTIONS: ROM to tolerance    Exercises/Interventions:     Exercise/Equipment Resistance/Repetitions Other comments   Stretching     Gastroc stretch 5x30\" long sitting, strap    Soleus stretch 5x30\" long sitting, strap, foam roll under knee    Self TC mob on chair x15 Perform before stretching                                                Isometrics     Dorsiflexion     Plantarflexion     Inversion 10x10\" into ball    Eversion 10x10\" into ball         PRE's     Toe curls into towel x30    Toe spreading x30    Dorsiflexion     Plantarflexion 3x10 green TB    Inversion     Eversion     Heel walk     Toe walk     SLR     Calf Raises     Step Up     Knee Extension     Hamstring Curls     Leg Press          Balance:     Rocker Board     BOSU     SLS     Aeromat     Foam Roll     Plyoback     Tandem Stance     Biodex          Bike     Treadmill          Manual interventions            Plan for next session: TC joint mobs    Therapeutic Exercise and NMR EXR  [x] (36315) Provided verbal/tactile cueing for activities related to strengthening, flexibility, endurance, ROM for improvements in LE, proximal hip, and core control with self care, mobility, lifting, ambulation.  [] (69145) Provided verbal/tactile cueing for activities related to improving balance, coordination, kinesthetic sense, posture, motor skill, proprioception  to assist with LE, proximal hip, and core control in self care, mobility, lifting, ambulation and eccentric single leg control.      NMR and Therapeutic Activities:    [] (81433 or 88270) Provided verbal/tactile cueing for activities related to improving balance, coordination, kinesthetic sense, posture, motor skill, proprioception and motor activation to allow for proper function of core, proximal hip and LE with self care and ADLs  [] (15932) Gait Re-education- Provided training and instruction to the patient for proper LE, core and proximal hip recruitment and positioning and eccentric body weight control with ambulation re-education including up and down stairs     Home Exercise Program:    Access Code: [email or web address]  URL: [email or web address]  Date: 12/02/2022  Prepared by: Sylvia Garcia     Exercises  Standing Ankle Dorsiflexion Stretch on Chair - 1 x daily - 7 x weekly - 1 sets - 15 reps  Long Sitting Calf Stretch with Strap - 1 x daily - 7 x weekly - 1 sets - 5 reps - 30 second hold  Long Sitting Soleus Stretch on Bolster with Strap - 1 x daily - 7 x weekly - 1 sets - 5 reps - 30 second hold  Seated Ankle Plantarflexion Dorsiflexion PROM - 1 x daily - 7 x weekly - 1 sets - 5 reps - 30 second hold  Isometric Ankle Inversion - 2 x daily - 7 x weekly - 1 sets - 10 reps - 10 second hold  Long Sitting Isometric Ankle Eversion in Dorsiflexion with Ball at [address] - 2 x daily - 7 x weekly - 1 sets - 10 reps - 10 second hold  Long Sitting Ankle Plantar Flexion with Resistance - 2 x daily - 7 x weekly - 3 sets - 10 reps  Seated Toe Towel Scrunches - 2 x daily - 7 x weekly - 3 sets - 10 reps  Toe Spreading - 2 x daily - 7 x weekly - 3 sets - 10 reps    [x] (61906) Reviewed/Progressed HEP activities related to strengthening, flexibility, endurance, ROM of core, proximal hip and LE for functional self-care, mobility, lifting and ambulation/stair navigation   [] (85638)Reviewed/Progressed HEP activities related to improving balance, coordination, kinesthetic sense, posture, motor skill, proprioception of core, proximal hip and LE for self care, mobility, lifting, and ambulation/stair navigation      Manual Treatments:    [] (38891) Provided manual therapy to mobilize LE, proximal by patients Functional Deficits. [] Progressing: [] Met: [] Not Met: [] Adjusted  4. Patient will return to ADLs, IADLs and functional activities without increased symptoms or restriction. [] Progressing: [] Met: [] Not Met: [] Adjusted  5. Patient will be able to negotiate stairs with reciprocal gait pattern without reports of TC joint tightness. [] Progressing: [] Met: [] Not Met: [] Adjusted       6. Patient will tolerate walking >1 mile to aid in return to golf and safe negotiation of the course. [] Progressing: [] Met: [] Not Met: [] Adjusted         Overall Progression Towards Functional goals/ Treatment Progress Update:  [] Patient is progressing as expected towards functional goals listed. [] Progression is slowed due to complexities/Impairments listed. [] Progression has been slowed due to co-morbidities. [x] Plan just implemented, too soon to assess goals progression <30days   [] Goals require adjustment due to lack of progress  [] Patient is not progressing as expected and requires additional follow up with physician  [] Other    Prognosis for POC: [x] Good [] Fair  [] Poor      Patient requires continued skilled intervention: [x] Yes  [] No    Treatment/Activity Tolerance:  [x] Patient able to complete treatment  [] Patient limited by fatigue  [] Patient limited by pain     [] Patient limited by other medical complications  [] Other:       PLAN: See eval  [] Continue per plan of care [] Alter current plan (see comments above)  [x] Plan of care initiated [] Hold pending MD visit [] Discharge    Electronically signed by:  Noah Pina, PT, DPT, OCS  Physical Therapist  [phone]  [email or web address]      Note: If patient does not return for scheduled/ recommended follow up visits, this note will serve as a discharge from care along with most recent update on progress.

## 2022-12-02 NOTE — PLAN OF CARE
The [address] and [address],  [facility] Rehabilitation, [facility] [address]  [address] [facility],[address]   Ariana Junior  Phone: [phone]  Fax: [phone]     Physical Therapy Certification    Dear Tasha Dumas MD,    We had the pleasure of evaluating the following patient for physical therapy services at [address] [place] [address]. A summary of our findings can be found in the initial assessment below. This includes our plan of care. If you have any questions or concerns regarding these findings, please do not hesitate to contact me at the office phone number checked above. Thank you for the referral.       Physician Signature:_______________________________Date:__________________  By signing above (or electronic signature), therapists plan is approved by physician      Patient: Terri Sue   : 1955   MRN: [id number]  Referring Physician: Tasha Dumas MD       Evaluation Date: 2022      Medical Diagnosis Information:  Achilles tendon tear, right, subsequent encounter [S86.011D]   Treatment Diagnosis: M25.671, R53.1, R26.2                                         Insurance information: PT Insurance Information: UMR/BMN/no auth req     Precautions/ Contra-indications:     C-SSRS Triggered by Intake questionnaire (Past 2 wk assessment):   [x] No, Questionnaire did not trigger screening.   [] Yes, Patient intake triggered further evaluation      [] C-SSRS Screening completed  [] PCP notified via Plan of Care  [] Emergency services notified     Latex Allergy:  [x]NO      []YES  Preferred Language for Healthcare:   [x]English       []other:    SUBJECTIVE: Patient stated complaint: Pt presents to PT 9 weeks s/p Right Achilles tendon secondary repair/reconstruction with SutureBridge incision enforcement and partial calcaneal ostectomy of Cesar's deformity. Pt saw CNP last week. Came out of the boot on Monday.  He tried chart reviewed. See intake form. Review Of Systems (ROS):  [x]Performed Review of systems (Integumentary, CardioPulmonary, Neurological) by intake and observation. Intake form has been scanned into medical record. Patient has been instructed to contact their primary care physician regarding ROS issues if not already being addressed at this time. Co-morbidities/Complexities (which will affect course of rehabilitation):   [x]None           Arthritic conditions   []Rheumatoid arthritis (M05.9)  []Osteoarthritis (M19.91)   Cardiovascular conditions   []Hypertension (I10)  []Hyperlipidemia (E78.5)  []Angina pectoris (I20)  []Atherosclerosis (I70)   Musculoskeletal conditions   []Disc pathology   []Congenital spine pathologies   []Prior surgical intervention  []Osteoporosis (M81.8)  []Osteopenia (M85.8)   Endocrine conditions   []Hypothyroid (E03.9)  []Hyperthyroid Gastrointestinal conditions   []Constipation (E74.65)   Metabolic conditions   []Morbid obesity (E66.01)  []Diabetes type 1(E10.65) or 2 (E11.65)   []Neuropathy (G60.9)     Pulmonary conditions   []Asthma (J45)  []Coughing   []COPD (J44.9)   Psychological Disorders  []Anxiety (F41.9)  []Depression (F32.9)   []Other:   []Other:          Barriers to/and or personal factors that will affect rehab potential:              []Age  []Sex              [x]Motivation/Lack of Motivation                        []Co-Morbidities              []Cognitive Function, education/learning barriers              []Environmental, home barriers              []profession/work barriers  []past PT/medical experience  []other:  Justification: pt is motivated to get better    Falls Risk Assessment (30 days):   [x] Falls Risk assessed and no intervention required.   [] Falls Risk assessed and Patient requires intervention due to being higher risk   TUG score (>12s at risk):     [] Falls education provided, including         ASSESSMENT:    Functional Impairments:     [x]Decreased LE joint mobility   [x]Decreased LE functional ROM   [x]Decreased core/proximal hip strength and neuromuscular control   [x]Decreased LE functional strength  [x]Reduced balance/proprioceptive control    []Foot biomechanics dysfunction requiring correction:   []other:    Functional Activity Limitations (from functional questionnaire and intake)   [x]Reduced ability to tolerate prolonged functional positions   [x]Reduced ability or difficulty with changes of positions or transfers between positions   [x]Reduced ability to maintain good posture and demonstrate good body mechanics with sitting, bending, and lifting   [x]Reduced ability to sleep   [x] Reduced ability or tolerance with driving    [x]Reduced ability to squat  Participation Restrictions   [x]Reduced participation in self care activities   [x]Reduced participation in home management activities   [x]Reduced participation in work activities   [x]Reduced participation in social activities. [x]Reduced participation in sport activities. Classification :    [x]Signs/symptoms consistent with post-surgical status including decreased ROM, strength and function.    []Signs/symptoms consistent with joint sprain/strain   []Signs/symptoms consistent with patella-femoral syndrome   []Signs/symptoms consistent with knee OA/hip OA   []Signs/symptoms consistent with internal derangement of knee/Hip/ankle   []Signs/symptoms consistent with functional hip/knee/ankle-foot weakness/NMR control      []Signs/symptoms consistent with tendinitis/tendinosis    []signs/symptoms consistent with pathology which may benefit from Dry needling      []other:      Prognosis/Rehab Potential:      [] Excellent   [x]Good    []Fair   []Poor    Tolerance of evaluation/treatment:    []Excellent   [x]Good    []Fair   []Poor    Physical Therapy Evaluation Complexity Justification   [x] A history of present problem with:  [x] no personal factors and/or comorbidities that impact the plan of care;  []1-2 personal factors and/or comorbidities that impact the plan of care  []3 personal factors and/or comorbidities that impact the plan of care  [x] An examination of body systems using standardized tests and measures addressing any of the following: body structures and functions (impairments), activity limitations, and/or participation restrictions;:  [] a total of 1-2 or more elements   [] a total of 3 or more elements   [x] a total of 4 or more elements   [x] A clinical presentation with:  [x] stable and/or uncomplicated characteristics   [] evolving clinical presentation with changing characteristics  [] unstable and unpredictable characteristics;   [x] Clinical decision making of [x] low, [] moderate, [] high complexity using standardized patient assessment instrument and/or measurable assessment of functional outcome. [x] EVAL (LOW) 30734 (typically 20 minutes face-to-face)  [] EVAL (MOD) 46782 (typically 30 minutes face-to-face)  [] EVAL (HIGH) 48343 (typically 45 minutes face-to-face)  [] RE-EVAL       PLAN  Frequency/Duration:  2 days per week for 12 Weeks:  Interventions:  [x]  Therapeutic exercise including: strength training, ROM, for Lower extremity and core   [x]  NMR activation and proprioception for LE, Glutes and Core   [x]  Manual therapy as indicated for LE, Hip and spine to include: Dry Needling/IASTM, STM, PROM, Gr I-IV mobilizations, manipulation. [x] Modalities as needed that may include: thermal agents, E-stim, Biofeedback, US, iontophoresis as indicated  [x] Patient education on joint protection, postural re-education, activity modification, progression of HEP. HEP instruction:     Access Code: [email or web address]  URL: [email or web address]  Date: 12/02/2022  Prepared by: Lisa Null    Exercises  Standing Ankle Dorsiflexion Stretch on Chair - 1 x daily - 7 x weekly - 1 sets - 15 reps  Long Sitting Calf Stretch with Strap - 1 x daily - 7 x weekly - 1 sets - 5 reps - 30 second hold  Long Sitting Soleus Stretch on Bolster with Strap - 1 x daily - 7 x weekly - 1 sets - 5 reps - 30 second hold  Seated Ankle Plantarflexion Dorsiflexion PROM - 1 x daily - 7 x weekly - 1 sets - 5 reps - 30 second hold  Isometric Ankle Inversion - 2 x daily - 7 x weekly - 1 sets - 10 reps - 10 second hold  Long Sitting Isometric Ankle Eversion in Dorsiflexion with Ball at [place] - 2 x daily - 7 x weekly - 1 sets - 10 reps - 10 second hold  Long Sitting Ankle Plantar Flexion with Resistance - 2 x daily - 7 x weekly - 3 sets - 10 reps  Seated Toe Towel Scrunches - 2 x daily - 7 x weekly - 3 sets - 10 reps  Toe Spreading - 2 x daily - 7 x weekly - 3 sets - 10 reps    GOALS:   Patient stated goal: get back to golf    [] Progressing: [] Met: [] Not Met: [] Adjusted    Therapist goals for Patient:   Short Term Goals: To be achieved in: 4 weeks 12/30/22  1. Independent in HEP and progression per patient tolerance, in order to prevent re-injury. [] Progressing: [] Met: [] Not Met: [] Adjusted   2. Patient will have a decrease in pain to facilitate improvement in movement, function, and ADLs as indicated by Functional Deficits. [] Progressing: [] Met: [] Not Met: [] Adjusted   3. Patient will demonstrate reciprocal gait pattern without limp. [] Progressing: [] Met: [] Not Met: [] Adjusted     Long Term Goals: To be achieved in: 12 weeks 2/24/23  1. Disability index score of 65+ on FOTO to assist with reaching prior level of function. [] Progressing: [] Met: [] Not Met: [] Adjusted  2. Patient will demonstrate increased AROM to WNL to allow for proper joint functioning as indicated by patients Functional Deficits. [] Progressing: [] Met: [] Not Met: [] Adjusted  3. Patient will demonstrate an increase in R ankle strength to 4+/5 or greater to allow for proper functional mobility as indicated by patients Functional Deficits. [] Progressing: [] Met: [] Not Met: [] Adjusted  4.  Patient will return to ADLs, IADLs and functional activities without increased symptoms or restriction. [] Progressing: [] Met: [] Not Met: [] Adjusted  5. Patient will be able to negotiate stairs with reciprocal gait pattern without reports of TC joint tightness. [] Progressing: [] Met: [] Not Met: [] Adjusted   6. Patient will tolerate walking >1 mile to aid in return to golf and safe negotiation of the course. [] Progressing: [] Met: [] Not Met: [] Adjusted     Electronically signed by:  Ta Le PT, DPT, [facility]  Physical Therapist  [id number]  [email or web address]    *If patient does not return for further follow ups after this date. Please consider this as the patients discharge from physical therapy.

## 2022-12-05 PROCEDURE — 93000 ELECTROCARDIOGRAM COMPLETE: CPT | Performed by: INTERNAL MEDICINE

## 2022-12-06 DIAGNOSIS — I48.19 PERSISTENT ATRIAL FIBRILLATION (HCC): ICD-10-CM

## 2022-12-06 LAB — TSH REFLEX FT4: 2.16 UIU/ML (ref 0.27–4.2)

## 2022-12-07 ENCOUNTER — OFFICE VISIT (OUTPATIENT)
Dept: FAMILY MEDICINE CLINIC | Age: 67
End: 2022-12-07
Payer: COMMERCIAL

## 2022-12-07 ENCOUNTER — HOSPITAL ENCOUNTER (OUTPATIENT)
Dept: PHYSICAL THERAPY | Age: 67
Setting detail: THERAPIES SERIES
Discharge: HOME | End: 2022-12-07
Payer: COMMERCIAL

## 2022-12-07 ENCOUNTER — HOSPITAL ENCOUNTER (OUTPATIENT)
Dept: GENERAL RADIOLOGY | Age: 67
Discharge: HOME | End: 2022-12-07
Payer: COMMERCIAL

## 2022-12-07 VITALS
HEIGHT: 71 IN | BODY MASS INDEX: 37.44 KG/M2 | DIASTOLIC BLOOD PRESSURE: 84 MMHG | TEMPERATURE: 96.9 F | OXYGEN SATURATION: 95 % | SYSTOLIC BLOOD PRESSURE: 100 MMHG | HEART RATE: 90 BPM | WEIGHT: 267.4 LBS

## 2022-12-07 DIAGNOSIS — N39.41 URGE INCONTINENCE OF URINE: ICD-10-CM

## 2022-12-07 DIAGNOSIS — R39.11 BENIGN PROSTATIC HYPERPLASIA WITH URINARY HESITANCY: ICD-10-CM

## 2022-12-07 DIAGNOSIS — M50.30 DDD (DEGENERATIVE DISC DISEASE), CERVICAL: ICD-10-CM

## 2022-12-07 DIAGNOSIS — Z23 NEED FOR PNEUMOCOCCAL VACCINATION: ICD-10-CM

## 2022-12-07 DIAGNOSIS — N40.1 BENIGN PROSTATIC HYPERPLASIA WITH URINARY HESITANCY: ICD-10-CM

## 2022-12-07 DIAGNOSIS — Z79.01 LONG TERM CURRENT USE OF ANTICOAGULANT: ICD-10-CM

## 2022-12-07 DIAGNOSIS — I48.19 PERSISTENT ATRIAL FIBRILLATION (HCC): Primary | ICD-10-CM

## 2022-12-07 DIAGNOSIS — Z23 NEEDS FLU SHOT: ICD-10-CM

## 2022-12-07 PROCEDURE — 90670 PCV13 VACCINE IM: CPT | Performed by: FAMILY MEDICINE

## 2022-12-07 PROCEDURE — 97110 THERAPEUTIC EXERCISES: CPT | Performed by: PHYSICAL THERAPIST

## 2022-12-07 PROCEDURE — 90472 IMMUNIZATION ADMIN EACH ADD: CPT | Performed by: FAMILY MEDICINE

## 2022-12-07 PROCEDURE — 97016 VASOPNEUMATIC DEVICE THERAPY: CPT | Performed by: PHYSICAL THERAPIST

## 2022-12-07 PROCEDURE — 90694 VACC AIIV4 NO PRSRV 0.5ML IM: CPT | Performed by: FAMILY MEDICINE

## 2022-12-07 PROCEDURE — 72040 X-RAY EXAM NECK SPINE 2-3 VW: CPT

## 2022-12-07 PROCEDURE — 1123F ACP DISCUSS/DSCN MKR DOCD: CPT | Performed by: FAMILY MEDICINE

## 2022-12-07 PROCEDURE — 90471 IMMUNIZATION ADMIN: CPT | Performed by: FAMILY MEDICINE

## 2022-12-07 PROCEDURE — 99214 OFFICE O/P EST MOD 30 MIN: CPT | Performed by: FAMILY MEDICINE

## 2022-12-07 PROCEDURE — 97140 MANUAL THERAPY 1/> REGIONS: CPT | Performed by: PHYSICAL THERAPIST

## 2022-12-07 RX ORDER — TAMSULOSIN HYDROCHLORIDE 0.4 MG/1
CAPSULE ORAL
Qty: 90 CAPSULE | Refills: 1 | OUTPATIENT
Start: 2022-12-07

## 2022-12-07 RX ORDER — OXYBUTYNIN CHLORIDE 5 MG/1
5 TABLET, EXTENDED RELEASE ORAL DAILY
Qty: 90 TABLET | Refills: 1 | Status: SHIPPED | OUTPATIENT
Start: 2022-12-07

## 2022-12-07 RX ORDER — TAMSULOSIN HYDROCHLORIDE 0.4 MG/1
0.4 CAPSULE ORAL DAILY
Qty: 90 CAPSULE | Refills: 1 | Status: SHIPPED | OUTPATIENT
Start: 2022-12-07

## 2022-12-07 RX ORDER — OXYBUTYNIN CHLORIDE 5 MG/1
TABLET, EXTENDED RELEASE ORAL
Qty: 90 TABLET | Refills: 1 | OUTPATIENT
Start: 2022-12-07

## 2022-12-07 NOTE — FLOWSHEET NOTE
The [facility] and [address],  Sports Performance and Rehabilitation, [facility] [address] [address]  [address] [facility],5[address]   Ariana Junior  Phone: [phone]  Fax: [phone]      Physical Therapy Daily Treatment Note  Date:  2022    Patient Name:  Tyree Nettles    :  1955  MRN: [id number]  Restrictions/Precautions:    Medical/Treatment Diagnosis Information:  Achilles tendon tear, right, subsequent encounter [S86.011D]  Treatment Diagnosis: M25.671, R53.1, R26.2  S/p Achilles repair DOS   Insurance/Certification information:  PT Insurance Information: UMR/BMN/no Imelda valdez  Physician Information:  Shruthi Rodriguez MD    Has the plan of care been signed (Y/N):        [x]  Yes  []  No     Date of Patient follow up with Physician: 1/3/23 with CNP      Is this a Progress Report:     []  Yes  [x]  No        If Yes:  Date Range for reporting period:  Beginning 22  Ending     Progress report will be due (10 Rx or 30 days whichever is less): 13       Recertification will be due (POC Duration  / 90 days whichever is less): 22         Visit # Insurance Allowable Auth Required   2 MED NEC []  Yes []  No      OUTCOME MEASURE DATE SCORE   FOTO 22 43             Latex Allergy:  [x]NO      []YES  Preferred Language for Healthcare:   [x]English       []other:      Pain level:  0/10     SUBJECTIVE:  Pt reports HEP is going well. He has been wearing gym shoes. No AD use for about 2/5 weeks now.     OBJECTIVE:           ROM PROM AROM Comments     Left Right Left Right     Dorsiflexion      2   22   Plantarflexion           Inversion           Eversion                                          Strength Left Right Comments   Dorsiflexion   3+/5     Plantarflexion   3+/5     Inversion   3+/5     Eversion   3+/5        Reflexes/Sensation:               []Dermatomes/Myotomes intact               []Reflexes equal and normal bilaterally []Other:     Joint mobility:               []Normal                      [x]Hypo              []Hyper     Palpation: TTP over incision     Functional Mobility/Transfers: Independent; difficulty with some ADLs d/t joint hypomobility     Posture:  WNL     Bandages/Dressings/Incisions:   12/2/22 incision healing well. + bump distal to Achilles, pt states that CNP told him it would be come less prominent over time     RESTRICTIONS/PRECAUTIONS: ROM to tolerance    Exercises/Interventions:     Exercise/Equipment Resistance/Repetitions Other comments   Stretching     Gastroc stretch 5x30\" long sitting, strap    Soleus stretch 5x30\" long sitting, strap, foam roll under knee    Self TC mob on chair Perform before stretching   Platinum board seated DF/PF, inv/ev, cw/ccw X15 ea Cue for ankle motion not knee   Seated HR/TR X25 ea    Seated inv/Ev x25                                  Isometrics     Dorsiflexion     Plantarflexion     Inversion    Eversion         PRE's     Toe curls into towel    Toe spreading    Dorsiflexion     Plantarflexion  Soleus pump 3x10 green TB  3x10 green   Cues to bend @ MTP   Inversion     Eversion     Heel walk     Toe walk     SLR     Calf Raises     Step Up     Knee Extension     Hamstring Curls     Leg Press          Balance:     Rocker Board     BOSU     SLS     Aeromat     Foam Roll     Plyoback     Tandem Stance 3x20\" R/L HEP   Standing hip abd X10 R/L HEP, posture cues, R>L hip fatigue   Bi[employer]x          Bike     Treadmill          Manual interventions 10' total    PROM 4 ways x2'    Scar massage x2'    Jt mobs: post talar, calc inv/ev, midfoot rotation, 1st ray PF x6'        Therapeutic Exercise and NMR EXR  [x] (40098) Provided verbal/tactile cueing for activities related to strengthening, flexibility, endurance, ROM for improvements in LE, proximal hip, and core control with self care, mobility, lifting, ambulation.  [] (40925) Provided verbal/tactile cueing for activities related to improving balance, coordination, kinesthetic sense, posture, motor skill, proprioception  to assist with LE, proximal hip, and core control in self care, mobility, lifting, ambulation and eccentric single leg control. NMR and Therapeutic Activities:    [] (69496 or 05428) Provided verbal/tactile cueing for activities related to improving balance, coordination, kinesthetic sense, posture, motor skill, proprioception and motor activation to allow for proper function of core, proximal hip and LE with self care and ADLs  [] (67992) Gait Re-education- Provided training and instruction to the patient for proper LE, core and proximal hip recruitment and positioning and eccentric body weight control with ambulation re-education including up and down stairs     Home Exercise Program:    Access Code: [email or web address]  URL: [email or web address]  Date: 12/07/2022  Prepared by: Sahara Heard    Exercises  Standing Ankle Dorsiflexion Stretch on Chair - 1 x daily - 7 x weekly - 1 sets - 15 reps  Long Sitting Calf Stretch with Strap - 1 x daily - 7 x weekly - 1 sets - 5 reps - 30 second hold  Long Sitting Soleus Stretch on Bolster with Strap - 1 x daily - 7 x weekly - 1 sets - 5 reps - 30 second hold  Seated Ankle Plantarflexion Dorsiflexion PROM - 1 x daily - 7 x weekly - 1 sets - 5 reps - 30 second hold  Isometric Ankle Inversion - 2 x daily - 7 x weekly - 1 sets - 10 reps - 10 second hold  Long Sitting Isometric Ankle Eversion in Dorsiflexion with Ball at [address] - 2 x daily - 7 x weekly - 1 sets - 10 reps - 10 second hold  Long Sitting Ankle Plantar Flexion with Resistance - 2 x daily - 7 x weekly - 3 sets - 10 reps  Seated Toe Towel Scrunches - 2 x daily - 7 x weekly - 3 sets - 10 reps  Toe Spreading - 2 x daily - 7 x weekly - 3 sets - 10 reps  Seated Heel Toe Raises - 1 x daily - 7 x weekly - 3 sets - 10 reps  Tandem Stance - 1 x daily - 7 x weekly - 3 sets - 20 seconds hold    [x] (26421) Reviewed/Progressed HEP activities related to strengthening, flexibility, endurance, ROM of core, proximal hip and LE for functional self-care, mobility, lifting and ambulation/stair navigation   [] (00856)Reviewed/Progressed HEP activities related to improving balance, coordination, kinesthetic sense, posture, motor skill, proprioception of core, proximal hip and LE for self care, mobility, lifting, and ambulation/stair navigation      Manual Treatments:    [x] (29819) Provided manual therapy to mobilize LE, proximal hip and/or LS spine soft tissue/joints for the purpose of modulating pain, promoting relaxation,  increasing ROM, reducing/eliminating soft tissue swelling/inflammation/restriction, improving soft tissue extensibility and allowing for proper ROM for normal function with self care, mobility, lifting and ambulation. Modalities:  GR medium comp x10' for post op swelling    Charges:  Timed Code Treatment Minutes: 45   Total Treatment Minutes: 55     [] EVAL (LOW) 43612 (typically 20 minutes face-to-face)  [] EVAL (MOD) 82253 (typically 30 minutes face-to-face)  [] EVAL (HIGH) 00370 (typically 45 minutes face-to-face)  [] RE-EVAL     [x] LB(77337) x 2    [] IONTO  [] NMR (47039) x     [x] VASO  [x] Manual (36560) x1      [] Other:  [] TA x      [] Mech Traction (24195)  [] ES(attended) (25271)      [] ES (un) (21088):     GOALS:   Patient stated goal: get back to golf    [] Progressing: [] Met: [] Not Met: [] Adjusted     Therapist goals for Patient:   Short Term Goals: To be achieved in: 4 weeks 12/30/22  1. Independent in HEP and progression per patient tolerance, in order to prevent re-injury. [] Progressing: [] Met: [] Not Met: [] Adjusted   2. Patient will have a decrease in pain to facilitate improvement in movement, function, and ADLs as indicated by Functional Deficits. [] Progressing: [] Met: [] Not Met: [] Adjusted   3. Patient will demonstrate reciprocal gait pattern without limp.   [] Progressing: [] Met: [] Not Met: [] Adjusted      Long Term Goals: To be achieved in: 12 weeks 2/24/23  1. Disability index score of 65+ on FOTO to assist with reaching prior level of function. [] Progressing: [] Met: [] Not Met: [] Adjusted  2. Patient will demonstrate increased AROM to WNL to allow for proper joint functioning as indicated by patients Functional Deficits. [] Progressing: [] Met: [] Not Met: [] Adjusted  3. Patient will demonstrate an increase in R ankle strength to 4+/5 or greater to allow for proper functional mobility as indicated by patients Functional Deficits. [] Progressing: [] Met: [] Not Met: [] Adjusted  4. Patient will return to ADLs, IADLs and functional activities without increased symptoms or restriction. [] Progressing: [] Met: [] Not Met: [] Adjusted  5. Patient will be able to negotiate stairs with reciprocal gait pattern without reports of TC joint tightness. [] Progressing: [] Met: [] Not Met: [] Adjusted       6. Patient will tolerate walking >1 mile to aid in return to golf and safe negotiation of the course. [] Progressing: [] Met: [] Not Met: [] Adjusted         Overall Progression Towards Functional goals/ Treatment Progress Update:  [] Patient is progressing as expected towards functional goals listed. [] Progression is slowed due to complexities/Impairments listed. [] Progression has been slowed due to co-morbidities.   [x] Plan just implemented, too soon to assess goals progression <30days   [] Goals require adjustment due to lack of progress  [] Patient is not progressing as expected and requires additional follow up with physician  [] Other    Prognosis for POC: [x] Good [] Fair  [] Poor      Patient requires continued skilled intervention: [x] Yes  [] No    Treatment/Activity Tolerance:  [x] Patient able to complete treatment  [] Patient limited by fatigue  [] Patient limited by pain     [] Patient limited by other medical complications  [] Other:     Assessment:  good progress of AROM ankle DF. Added gentle CKC activities without reported achilles pain or strain. Functional hip weakness noted with standing TE. PLAN: See eval  [x] Continue per plan of care [] Alter current plan (see comments above)  [] Plan of care initiated [] Hold pending MD visit [] Discharge    Electronically signed by:  Kam Braxton PT,     Note: If patient does not return for scheduled/ recommended follow up visits, this note will serve as a discharge from care along with most recent update on progress.

## 2022-12-07 NOTE — PROGRESS NOTES
Jalen Rowan is a 77 y.o. Male who came into the clinic today for recheck and for appropriate management. The patient was last seen by me on 5/12/2022. The patient informs me that he has been taking his medications   as prescribed and has not noticed any side effect from the same. The patient wanted refill of his medications   today. The patient followed up with a cardiologist in concerns to his persistent atrial fibrillation and there has   been some medication changes made. The patient is also scheduled to have some testing done as recommended   by the cardiologist.      The patient today informs me that he has history of cervical degenerative disc disease for which he had few surgeries   done in the cervical spine. The patient lately and started noticing numbness and tingling in his left upper extremity. I recommended the patient to have a cervical x-ray at this time. The patient also wants a referral to the neurosurgeon   to discuss further management. The patient also wanted the influenza vaccination and the pneumococcal vaccination   in the clinic today. Otherwise today he did not have any other questions or concerns and all the question and concerns   were appropriately answered.     I reviewed and discussed below mentioned labs with the patient today:    Lab Results   Component Value Date/Time    WBC 6.4 05/12/2022 02:17 PM    RBC 4.37 05/12/2022 02:17 PM    MCV 91.7 05/12/2022 02:17 PM    MCHC 33.9 05/12/2022 02:17 PM     Lab Results   Component Value Date/Time    ANIONGAP 11 05/12/2022 02:17 PM    GLUCOSE 91 05/12/2022 02:17 PM    BUN 16 05/12/2022 02:17 PM    CREATININE 0.9 05/12/2022 02:17 PM    AGRATIO 1.4 05/12/2022 02:17 PM    CALCIUM 9.1 05/12/2022 02:17 PM     Lab Results   Component Value Date/Time    PSA 0.94 05/12/2022 02:17 PM       Past Medical History:   Diagnosis Date    Atrial fibrillation (HCC)     BPH (benign prostatic hyperplasia)     Urge incontinence Patient Active Problem List   Diagnosis    Permanent atrial fibrillation (HCC)    Benign prostatic hyperplasia with urinary hesitancy    Urge incontinence of urine    Hypovitaminosis D    Pre-diabetes    Long term current use of anticoagulant    Avulsion of achilles tendon    Cesar's deformity of right heel       Past Surgical History:   Procedure Laterality Date    ACHILLES TENDON SURGERY Left     ACHILLES TENDON SURGERY Right 9/29/2022    RIGHT ACHILLES SECONDAY RECONSTRUCTION WITH CALCANEUS EXCISION performed by Erica Rangel MD at [address] [facility] [address]      2 2 level fusions    REFRACTIVE SURGERY Bilateral        Family History   Problem Relation Age of Onset    No Known Problems Mother     No Known Problems Father        Social History     Tobacco Use    Smoking status: Never    Smokeless tobacco: Never   Substance Use Topics    Alcohol use: Yes     Comment: occasional       Current Outpatient Medications on File Prior to Visit   Medication Sig Dispense Refill    metoprolol succinate (TOPROL XL) 100 MG extended release tablet Take 1 tablet by mouth 2 times daily 60 tablet 3    rivaroxaban (XARELTO) 20 MG TABS tablet Take 1 tablet by mouth Daily with supper 30 tablet 5     No current facility-administered medications on file prior to visit. No Known Allergies    REVIEW OF SYSTEMS:   CONSTITUTIONAL: No weight loss, fever, chills, weakness or fatigue. HEENT: Eyes: No visual loss, blurred vision, double vision or yellow sclerae. Ears, Nose, Throat: No hearing loss, sneezing, congestion, runny nose or sore throat. SKIN: No rash or itching. CARDIOVASCULAR: No chest pain, chest pressure or chest discomfort. No palpitations. Complains of edema. RESPIRATORY: No shortness of breath, cough or sputum. GASTROINTESTINAL: No anorexia, nausea, vomiting, diarrhea or constipation. No abdominal pain, hematochezia or melena. GENITOURINARY:No dysuria, frequency, hematuria.  Complains of urgency and incontinence. NEUROLOGICAL: No headache, dizziness, syncope, paralysis, ataxia,   Complains of numbness or tingling in the left upper extremities. No change in bowel or bladder control. MUSCULOSKELETAL: No muscle pain, back pain, joint pain or stiffness. PSYCHIATRIC: No history of depression or anxiety. ENDOCRINOLOGIC: No reports of sweating, cold or heat intolerance. No polyuria or polydipsia. Objective     . /84   Pulse 90   Temp 96.9 °F (36.1 °C)   Ht 5' 11\" (1.803 m)   Wt 267 lb 6.4 oz (121.3 kg)   SpO2 95%   BMI 37.29 kg/m²     GENERAL:  Gio Barnett is a 77 y.o.  male who is not in any acute distress. He was well attired and well groomed and was speaking in full sentences. LUNGS:  Normal ventilatory breath sounds are heard bilaterally. No crackles   or wheezes heard. CARDIOVASCULAR: Irregularly irregular rhythm. Normal S1, S2 heard. No murmurs heard. No JVD. GASTROINTESTINAL:  Abdomen soft, nontender. No guarding or rigidity noted. No organomegaly noted. Normal bowel sounds were heard. EXTREMITIES:  All 4 extremities were moving fine. Full range of motion is   present. No deformity noted. Peripheral pulses were felt. Bilateral 1+ lower   extremity edema. Neurovascular integrity maintained. NEUROLOGICAL:  The patient was alert, conscious, and cooperative. Oriented   to time, place, and person. Muscle power in all 4 limbs is 5/5. Cranial   nerves II thru XII are grossly intact. No sensory or motor loss. Assessment/Plan     Diagnoses and all orders for this visit:    Persistent atrial fibrillation (HCC)    Urge incontinence of urine  -     oxybutynin (DITROPAN-XL) 5 MG extended release tablet; Take 1 tablet by mouth daily    Benign prostatic hyperplasia with urinary hesitancy  -     tamsulosin (FLOMAX) 0.4 MG capsule; Take 1 capsule by mouth daily    DDD (degenerative disc disease), cervical  -     XR CERVICAL SPINE (2-3 VIEWS);  Future  -     AFL - Tonja Rosas MD, Neurosurgery, [facility]    Long term current use of anticoagulant    Needs flu shot  -     Influenza, FLUAD, (age 72 y+), IM, Preservative Free, 0.5 mL    Need for pneumococcal vaccination  -     Pneumococcal, PCV-13, PREVNAR 15, (age 10 wks+), IM      Advise given:  - Take all prescription medication as prescribed. - Eat five servings of fruits and vegetables everyday. - Discussed importance of regular exercise and recommended starting or continuing a regular exercise program for good health. - Try to lose weight with diet and exercise as discussed. - The importance of monitoring blood sugar regularly was reviewed. - The importance of proper foot care and regularly checking feet to prevent sores and possibly loss of limbs was reviewed. - The importance of keeping the blood pressure monitoring to prevent stroke, heart attacks, kidney failure, blindness, and loss of limbs was reviewed. - Appropriate handout were given to the patient. -  All the questions and concerns were appropriately answered. - Patient / family member / caregiver verbalized understanding of patient instructions from today's visit. - The patient was advised to follow up with me in 6 months for recheck or can call me before if has any other questions or concerns.

## 2022-12-13 ENCOUNTER — HOSPITAL ENCOUNTER (OUTPATIENT)
Dept: NON INVASIVE DIAGNOSTICS | Age: 67
Discharge: HOME | End: 2022-12-13
Payer: COMMERCIAL

## 2022-12-13 DIAGNOSIS — I48.19 PERSISTENT ATRIAL FIBRILLATION (HCC): ICD-10-CM

## 2022-12-13 PROCEDURE — 6360000002 HC RX W HCPCS: Performed by: INTERNAL MEDICINE

## 2022-12-13 PROCEDURE — 78452 HT MUSCLE IMAGE SPECT MULT: CPT

## 2022-12-13 PROCEDURE — 3430000000 HC RX DIAGNOSTIC RADIOPHARMACEUTICAL: Performed by: INTERNAL MEDICINE

## 2022-12-13 PROCEDURE — A9502 TC99M TETROFOSMIN: HCPCS | Performed by: INTERNAL MEDICINE

## 2022-12-13 PROCEDURE — 93017 CV STRESS TEST TRACING ONLY: CPT

## 2022-12-13 RX ADMIN — TETROFOSMIN 10 MILLICURIE: 1.38 INJECTION, POWDER, LYOPHILIZED, FOR SOLUTION INTRAVENOUS at 08:24

## 2022-12-13 RX ADMIN — REGADENOSON 0.4 MG: 0.08 INJECTION, SOLUTION INTRAVENOUS at 09:42

## 2022-12-13 RX ADMIN — TETROFOSMIN 30 MILLICURIE: 1.38 INJECTION, POWDER, LYOPHILIZED, FOR SOLUTION INTRAVENOUS at 09:41

## 2022-12-14 ENCOUNTER — PROCEDURE VISIT (OUTPATIENT)
Dept: CARDIOLOGY CLINIC | Age: 67
End: 2022-12-14
Payer: COMMERCIAL

## 2022-12-14 ENCOUNTER — HOSPITAL ENCOUNTER (OUTPATIENT)
Dept: PHYSICAL THERAPY | Age: 67
Setting detail: THERAPIES SERIES
Discharge: HOME | End: 2022-12-14
Payer: COMMERCIAL

## 2022-12-14 DIAGNOSIS — I48.19 PERSISTENT ATRIAL FIBRILLATION (HCC): ICD-10-CM

## 2022-12-14 PROCEDURE — 97530 THERAPEUTIC ACTIVITIES: CPT | Performed by: PHYSICAL THERAPIST

## 2022-12-14 PROCEDURE — 97140 MANUAL THERAPY 1/> REGIONS: CPT | Performed by: PHYSICAL THERAPIST

## 2022-12-14 PROCEDURE — 93306 TTE W/DOPPLER COMPLETE: CPT | Performed by: INTERNAL MEDICINE

## 2022-12-14 PROCEDURE — 97110 THERAPEUTIC EXERCISES: CPT | Performed by: PHYSICAL THERAPIST

## 2022-12-14 NOTE — FLOWSHEET NOTE
The [address] [facility] and [address],  Sports Performance and Rehabilitation, [facility] [address]  [address] [facility],5Th Floor   [facility], [address]  Phone: [phone]  Fax: [phone]      Physical Therapy Daily Treatment Note  Date:  2022    Patient Name:  Stephanie Flores    :  1955  MRN: [id number]  Restrictions/Precautions:    Medical/Treatment Diagnosis Information:  Achilles tendon tear, right, subsequent encounter [S86.011D]  Treatment Diagnosis: M25.671, R53.1, R26.2  S/p Achilles repair DOS 01  Insurance/Certification information:  PT Insurance Information: UMR/BMN/no Beverly valdez  Physician Information:  Jeanne Kaplan MD    Has the plan of care been signed (Y/N):        [x]  Yes  []  No     Date of Patient follow up with Physician: 1/3/23 with CNP      Is this a Progress Report:     []  Yes  [x]  No        If Yes:  Date Range for reporting period:  Beginning 22  Ending     Progress report will be due (10 Rx or 30 days whichever is less):        Recertification will be due (POC Duration  / 90 days whichever is less): 22         Visit # Insurance Allowable Auth Required   3 MED NEC []  Yes []  No      OUTCOME MEASURE DATE SCORE   FOTO 22 43             Latex Allergy:  [x]NO      []YES  Preferred Language for Healthcare:   [x]English       []other:      Pain level:  0/10     SUBJECTIVE:  11 weeks po. Pt states that his ankle has been feeling good. Notes he is still limping but unsure why as he does not have pain.     OBJECTIVE:           ROM PROM AROM Comments     Left Right Left Right     Dorsiflexion      6    Plantarflexion      50     Inversion      35     Eversion      5                                    Strength Left Right Comments   Dorsiflexion   3+/5     Plantarflexion   3+/5     Inversion   3+/5     Eversion   3+/5        Reflexes/Sensation:               []Dermatomes/Myotomes intact               []Reflexes equal and normal bilaterally              []Other:     Joint mobility:               []Normal                      [x]Hypo              []Hyper     Palpation: TTP over incision     Functional Mobility/Transfers: Independent; difficulty with some ADLs d/t joint hypomobility     Posture:  WNL     Bandages/Dressings/Incisions:   12/2/22 incision healing well. + bump distal to Achilles, pt states that CNP told him it would be come less prominent over time     RESTRICTIONS/PRECAUTIONS: ROM to tolerance    Exercises/Interventions:     Exercise/Equipment Resistance/Repetitions Other comments   Stretching     Gastroc stretch 5x30\" incline board    Soleus stretch 5x30\" incline board    Self TC mob on chair Perform before stretching   [place] board seated DF/PF, inv/ev, cw/ccw X15 ea Cue for ankle motion not knee   Seated HR/TR X25 ea    Seated inv/Ev                                  Isometrics     Dorsiflexion     Plantarflexion     Inversion    Eversion         PRE's     Toe curls into towel    Toe spreading    Dorsiflexion     Plantarflexion  Soleus pump 3x10 black    Cues to bend @ MTP   Inversion 3x10 red    Eversion 3x10 red    Heel walk     Toe walk     SLR     Calf Raises 3x10 seated  3x10 standing, DL Focus on great toe ext   Step Up     Knee Extension     Hamstring Curls     Leg Press          Balance:     Rocker Board     BOSU     SLS     Aeromat     Foam Roll     Plyoback     Tandem Stance 3x30\" R/L airex   Standing hip abd HEP, posture cues, R>L hip fatigue   Biodex          Bike     Treadmill          Manual interventions 10' total    PROM 4 ways x2'    Scar massage x2'    Jt mobs: post talar, calc inv/ev, midfoot rotation, 1st ray PF x6'        Therapeutic Exercise and NMR EXR  [x] (58590) Provided verbal/tactile cueing for activities related to strengthening, flexibility, endurance, ROM for improvements in LE, proximal hip, and core control with self care, mobility, lifting, ambulation.  [] (88906) Provided verbal/tactile cueing for activities related to improving balance, coordination, kinesthetic sense, posture, motor skill, proprioception  to assist with LE, proximal hip, and core control in self care, mobility, lifting, ambulation and eccentric single leg control. NMR and Therapeutic Activities:    [] (16412 or 26728) Provided verbal/tactile cueing for activities related to improving balance, coordination, kinesthetic sense, posture, motor skill, proprioception and motor activation to allow for proper function of core, proximal hip and LE with self care and ADLs  [] (98276) Gait Re-education- Provided training and instruction to the patient for proper LE, core and proximal hip recruitment and positioning and eccentric body weight control with ambulation re-education including up and down stairs     Home Exercise Program:    Access Code: [email or web address]  URL: [email or web address]  Updated: 12/14/2022  Prepared by: Stephanie Leo    Exercises  Standing Ankle Dorsiflexion Stretch on Chair - 2 x daily - 7 x weekly - 1 sets - 15 reps  Standing Gastroc Stretch on Step - 2 x daily - 7 x weekly - 1 sets - 5 reps - 30 second hold  Standing Soleus Stretch on Step - 2 x daily - 7 x weekly - 1 sets - 5 reps - 30 second hold  Seated Ankle Plantarflexion Dorsiflexion PROM - 2 x daily - 7 x weekly - 1 sets - 5 reps - 30 second hold  Long Sitting Ankle Plantar Flexion with Resistance - 2 x daily - 7 x weekly - 3 sets - 10 reps  Long Sitting Ankle Inversion with Resistance - 2 x daily - 7 x weekly - 3 sets - 10 reps  Long Sitting Ankle Eversion with Resistance - 2 x daily - 7 x weekly - 3 sets - 10 reps  Seated Heel Raise - 2 x daily - 7 x weekly - 3 sets - 10 reps  Seated Heel Toe Raises - 2 x daily - 7 x weekly - 3 sets - 10 reps  Standing Heel Raise - 2 x daily - 7 x weekly - 3 sets - 10 reps  Seated Toe Towel Scrunches - 2 x daily - 7 x weekly - 3 sets - 10 reps  Toe Spreading - 2 x daily - 7 x weekly - 3 sets - 10 reps  Tandem Stance - 2 x daily - 7 x weekly - 3 sets - 30 seconds hold    [x] (82187) Reviewed/Progressed HEP activities related to strengthening, flexibility, endurance, ROM of core, proximal hip and LE for functional self-care, mobility, lifting and ambulation/stair navigation   [] (31338)Reviewed/Progressed HEP activities related to improving balance, coordination, kinesthetic sense, posture, motor skill, proprioception of core, proximal hip and LE for self care, mobility, lifting, and ambulation/stair navigation      Manual Treatments:    [x] (45330) Provided manual therapy to mobilize LE, proximal hip and/or LS spine soft tissue/joints for the purpose of modulating pain, promoting relaxation,  increasing ROM, reducing/eliminating soft tissue swelling/inflammation/restriction, improving soft tissue extensibility and allowing for proper ROM for normal function with self care, mobility, lifting and ambulation. Modalities:    Charges:  Timed Code Treatment Minutes: 45   Total Treatment Minutes: 45   Time in: 7:00  Time out: 7:47    [] EVAL (LOW) 54385 (typically 20 minutes face-to-face)  [] EVAL (MOD) 15477 (typically 30 minutes face-to-face)  [] EVAL (HIGH) 23165 (typically 45 minutes face-to-face)  [] RE-EVAL     [x] UJ(32172) x 1    [] IONTO  [] NMR (72570) x     [] VASO  [x] Manual (13498) x1      [] Other:  [x] TA x 1     [] Mech Traction (21203)  [] ES(attended) (06410)      [] ES (un) (96096):     GOALS:   Patient stated goal: get back to golf    [] Progressing: [] Met: [] Not Met: [] Adjusted     Therapist goals for Patient:   Short Term Goals: To be achieved in: 4 weeks 12/30/22  1. Independent in HEP and progression per patient tolerance, in order to prevent re-injury. [] Progressing: [] Met: [] Not Met: [] Adjusted   2. Patient will have a decrease in pain to facilitate improvement in movement, function, and ADLs as indicated by Functional Deficits. [] Progressing: [] Met: [] Not Met: [] Adjusted   3.  Patient will demonstrate reciprocal gait pattern without limp. [] Progressing: [] Met: [] Not Met: [] Adjusted      Long Term Goals: To be achieved in: 12 weeks 2/24/23  1. Disability index score of 65+ on FOTO to assist with reaching prior level of function. [] Progressing: [] Met: [] Not Met: [] Adjusted  2. Patient will demonstrate increased AROM to WNL to allow for proper joint functioning as indicated by patients Functional Deficits. [] Progressing: [] Met: [] Not Met: [] Adjusted  3. Patient will demonstrate an increase in R ankle strength to 4+/5 or greater to allow for proper functional mobility as indicated by patients Functional Deficits. [] Progressing: [] Met: [] Not Met: [] Adjusted  4. Patient will return to ADLs, IADLs and functional activities without increased symptoms or restriction. [] Progressing: [] Met: [] Not Met: [] Adjusted  5. Patient will be able to negotiate stairs with reciprocal gait pattern without reports of TC joint tightness. [] Progressing: [] Met: [] Not Met: [] Adjusted       6. Patient will tolerate walking >1 mile to aid in return to golf and safe negotiation of the course. [] Progressing: [] Met: [] Not Met: [] Adjusted         Overall Progression Towards Functional goals/ Treatment Progress Update:  [] Patient is progressing as expected towards functional goals listed. [] Progression is slowed due to complexities/Impairments listed. [] Progression has been slowed due to co-morbidities.   [x] Plan just implemented, too soon to assess goals progression <30days   [] Goals require adjustment due to lack of progress  [] Patient is not progressing as expected and requires additional follow up with physician  [] Other    Prognosis for POC: [x] Good [] Fair  [] Poor      Patient requires continued skilled intervention: [x] Yes  [] No    Treatment/Activity Tolerance:  [x] Patient able to complete treatment  [] Patient limited by fatigue  [] Patient limited by pain     [] Patient limited by other medical complications  [] Other:     Assessment:  Pt demonstrates improved AROM of ankle joint. Pt exhibits good 1st ray and MTP mobility, good PROM ext of 1st MTP but does lack functional AROM. Pt cued to keep big toe on ground and focus on joint ext during seated calf raises as pt was raising up onto toe with minimal MTP mobility. Pt tolerated transition from isometrics to isotonic band EV and INV well, initial cues to avoid hip rotation. Pt felt increased intensity of calf stretch with transition to incline board, did report anterior joint stiffness/discomfort during soleus stretch. Pt requires PT follow up to address ROM, strength and functional mobility deficits. PLAN: See eval  [x] Continue per plan of care [] Alter current plan (see comments above)  [] Plan of care initiated [] Hold pending MD visit [] Discharge    Electronically signed by:  Moni Feldman, PT, DPT, OCS  Physical Therapist  [id number]  [email or web address]    Note: If patient does not return for scheduled/ recommended follow up visits, this note will serve as a discharge from care along with most recent update on progress.

## 2022-12-16 ENCOUNTER — HOSPITAL ENCOUNTER (OUTPATIENT)
Dept: PHYSICAL THERAPY | Age: 67
Setting detail: THERAPIES SERIES
Discharge: HOME | End: 2022-12-16
Payer: COMMERCIAL

## 2022-12-16 NOTE — FLOWSHEET NOTE
[facility] and [address],  Sports Performance and Rehabilitation, [facility] [address]  793 [facility],5Th Floor   [place]  Phone: [phone]  Fax: [phone]      Physical Therapy  Cancellation/No-show Note  Patient Name:  Jacinta Garcia  :  1955   Date:  2022  Cancelled visits to date: 0  No-shows to date: 0    For today's appointment patient:  []  Cancelled  []  Rescheduled appointment  [x]  No-show     Reason given by patient:  []  Patient ill  []  Conflicting appointment   []  No transportation    []  Conflict with work  []  No reason given  []  Other:     Comments:      Electronically signed by:  Greg Campbell PT, DPT, OCS  Physical Therapist  [id number]  [email or web address]

## 2022-12-19 ENCOUNTER — HOSPITAL ENCOUNTER (OUTPATIENT)
Dept: PHYSICAL THERAPY | Age: 67
Setting detail: THERAPIES SERIES
Discharge: HOME | End: 2022-12-19
Payer: COMMERCIAL

## 2022-12-19 NOTE — FLOWSHEET NOTE
The [facility] and [address],  Sports Performance and Rehabilitation, [facility], [facility] [address]  793 [facility],5Th Floor   [place], [address]  Phone: [phone]  Fax: [phone]      Physical Therapy  Cancellation/No-show Note  Patient Name:  Silvano Zaidi  :  1955   Date:  2022  Cancelled visits to date: 1   No-shows to date: 0    For today's appointment patient:  [x]  Cancelled  []  Rescheduled appointment  []  No-show     Reason given by patient:  []  Patient ill  []  Conflicting appointment   []  No transportation    []  Conflict with work  [x]  No reason given  []  Other:     Comments:      Electronically signed by:  Dmitry Jenkins PT,

## 2022-12-21 ENCOUNTER — HOSPITAL ENCOUNTER (OUTPATIENT)
Dept: PHYSICAL THERAPY | Age: 67
Setting detail: THERAPIES SERIES
Discharge: HOME | End: 2022-12-21
Payer: COMMERCIAL

## 2022-12-21 NOTE — FLOWSHEET NOTE
Psychiatric and [address],  Sports Performance and Rehabilitation, On license of [facility] [address]  793 [facility],5Th Floor   [place]  Phone: [phone]  Fax: [phone]      Physical Therapy  Cancellation/No-show Note  Patient Name:  Tyrese Flores  :  1955   Date:  2022  Cancelled visits to date: 2  No-shows to date: 1    For today's appointment patient:  [x]  Cancelled  []  Rescheduled appointment  []  No-show     Reason given by patient:  []  Patient ill  []  Conflicting appointment   []  No transportation    [x]  Conflict with work  []  No reason given  []  Other:     Comments:      Electronically signed by:  So Mohamud PT,

## 2022-12-27 RX ORDER — METOPROLOL SUCCINATE 100 MG/1
TABLET, EXTENDED RELEASE ORAL
Qty: 60 TABLET | Refills: 3 | Status: SHIPPED | OUTPATIENT
Start: 2022-12-27

## 2022-12-30 ENCOUNTER — HOSPITAL ENCOUNTER (OUTPATIENT)
Dept: PHYSICAL THERAPY | Age: 67
Setting detail: THERAPIES SERIES
Discharge: HOME | End: 2022-12-30
Payer: COMMERCIAL

## 2022-12-30 PROCEDURE — 97110 THERAPEUTIC EXERCISES: CPT | Performed by: PHYSICAL THERAPIST

## 2022-12-30 PROCEDURE — 97530 THERAPEUTIC ACTIVITIES: CPT | Performed by: PHYSICAL THERAPIST

## 2022-12-30 PROCEDURE — 97140 MANUAL THERAPY 1/> REGIONS: CPT | Performed by: PHYSICAL THERAPIST

## 2022-12-30 NOTE — FLOWSHEET NOTE
[facility] and [address],  Sports Performance and Rehabilitation, [facility] [address]  [address] [facility],5[address] Floor   Ariana Junior  Phone: [phone]  Fax: [phone]      Physical Therapy Daily Treatment Note  Date:  2022    Patient Name:  Jacquie Michel    :  1955  MRN: [id number]  Restrictions/Precautions:    Medical/Treatment Diagnosis Information:  Achilles tendon tear, right, subsequent encounter [S86.011D]  Treatment Diagnosis: M25.671, R53.1, R26.2  S/p Achilles repair DOS   Insurance/Certification information:  PT Insurance Information: UMR/BMN/no Erica valdez  Physician Information:  Chepe Zavala MD    Has the plan of care been signed (Y/N):        [x]  Yes  []  No     Date of Patient follow up with Physician: 1/3/23 with CNP      Is this a Progress Report:     []  Yes  [x]  No        If Yes:  Date Range for reporting period:  Beginning 22  Ending     Progress report will be due (10 Rx or 30 days whichever is less):        Recertification will be due (POC Duration  / 90 days whichever is less): 22         Visit # Insurance Allowable Auth Required   3 MED NEC []  Yes []  No      OUTCOME MEASURE DATE SCORE   FOTO 22 43             Latex Allergy:  [x]NO      []YES  Preferred Language for Healthcare:   [x]English       []other:      Pain level:  0/10     SUBJECTIVE:  13 weeks po. Pt states he has been getting muscle spasms since mid last week, not every day but does make his calf muscle really sore for a day or two. Notes they are sporadic, no pattern that he has noticed. Will get spasm when he is sitting or laying, won't happen when he is moving. Pt states that he feels like he is not progressing as fast as he would like, still having a really hard time with R calf raise.     OBJECTIVE:           ROM PROM AROM Comments     Left Right Left Right     Dorsiflexion      6    Plantarflexion      50     Inversion      35 rosalba PF    IASTM 8' gastroc/soleus HG 8: sweeping  HG 9: brushing       Therapeutic Exercise and NMR EXR  [x] (13399) Provided verbal/tactile cueing for activities related to strengthening, flexibility, endurance, ROM for improvements in LE, proximal hip, and core control with self care, mobility, lifting, ambulation.  [] (07886) Provided verbal/tactile cueing for activities related to improving balance, coordination, kinesthetic sense, posture, motor skill, proprioception  to assist with LE, proximal hip, and core control in self care, mobility, lifting, ambulation and eccentric single leg control. NMR and Therapeutic Activities:    [] (52747 or 70803) Provided verbal/tactile cueing for activities related to improving balance, coordination, kinesthetic sense, posture, motor skill, proprioception and motor activation to allow for proper function of core, proximal hip and LE with self care and ADLs  [] (09173) Gait Re-education- Provided training and instruction to the patient for proper LE, core and proximal hip recruitment and positioning and eccentric body weight control with ambulation re-education including up and down stairs     Home Exercise Program:    Access Code: [email or web address]; pt logs in online- does not need print out for updates  URL: [employer]/  Updated: 12/29/2022  Prepared by: Yandy Luevano    Exercises  Standing Ankle Dorsiflexion Stretch on Chair - 2 x daily - 7 x weekly - 1 sets - 15 reps  Standing Gastroc Stretch on Step - 2 x daily - 7 x weekly - 1 sets - 5 reps - 30 second hold  Standing Soleus Stretch on Step - 2 x daily - 7 x weekly - 1 sets - 5 reps - 30 second hold  Seated Ankle Plantarflexion Dorsiflexion PROM - 2 x daily - 7 x weekly - 1 sets - 5 reps - 30 second hold  Long Sitting Ankle Plantar Flexion with Resistance - 2 x daily - 7 x weekly - 3 sets - 10 reps  Long Sitting Ankle Inversion with Resistance - 2 x daily - 7 x weekly - 3 sets - 10 reps  Long Sitting Ankle Eversion with Resistance - 2 x daily - 7 x weekly - 3 sets - 10 reps  Seated Heel Raise - 2 x daily - 7 x weekly - 3 sets - 10 reps  Seated Heel Toe Raises - 2 x daily - 7 x weekly - 3 sets - 10 reps  Standing Eccentric Heel Raise - 1 x daily - 7 x weekly - 3 sets - 5 reps  Standing Heel Raise - 2 x daily - 7 x weekly - 3 sets - 10 reps  Seated Toe Towel Scrunches - 2 x daily - 7 x weekly - 3 sets - 10 reps  Toe Spreading - 2 x daily - 7 x weekly - 3 sets - 10 reps  Tandem Stance - 2 x daily - 7 x weekly - 3 sets - 30 seconds hold  Side Step Down with Counter Support - 2 x daily - 7 x weekly - 3 sets - 10 reps  Calf Mobilization with Small Ball - 1 x daily - 7 x weekly - 1 sets - 1 reps - 3-5 min hold    [x] (02125) Reviewed/Progressed HEP activities related to strengthening, flexibility, endurance, ROM of core, proximal hip and LE for functional self-care, mobility, lifting and ambulation/stair navigation   [] (41682)Reviewed/Progressed HEP activities related to improving balance, coordination, kinesthetic sense, posture, motor skill, proprioception of core, proximal hip and LE for self care, mobility, lifting, and ambulation/stair navigation      Manual Treatments:    [x] (29988) Provided manual therapy to mobilize LE, proximal hip and/or LS spine soft tissue/joints for the purpose of modulating pain, promoting relaxation,  increasing ROM, reducing/eliminating soft tissue swelling/inflammation/restriction, improving soft tissue extensibility and allowing for proper ROM for normal function with self care, mobility, lifting and ambulation.      Modalities:    Charges:  Timed Code Treatment Minutes: 45   Total Treatment Minutes: 45   Time in: 7:45  Time out: 8:30    [] EVAL (LOW) 58927 (typically 20 minutes face-to-face)  [] EVAL (MOD) 73509 (typically 30 minutes face-to-face)  [] EVAL (HIGH) 00946 (typically 45 minutes face-to-face)  [] RE-EVAL     [x] MZ(89155) x 1    [] IONTO  [] NMR (94787) x     [] VASO  [x] Manual (88048) x1      [] Other:  [x] TA x 1     [] Mech Traction (81457)  [] ES(attended) (97865)      [] ES (un) (99329):     GOALS:   Patient stated goal: get back to golf    [] Progressing: [] Met: [] Not Met: [] Adjusted     Therapist goals for Patient:   Short Term Goals: To be achieved in: 4 weeks 12/30/22  1. Independent in HEP and progression per patient tolerance, in order to prevent re-injury. [] Progressing: [] Met: [] Not Met: [] Adjusted   2. Patient will have a decrease in pain to facilitate improvement in movement, function, and ADLs as indicated by Functional Deficits. [] Progressing: [] Met: [] Not Met: [] Adjusted   3. Patient will demonstrate reciprocal gait pattern without limp. [] Progressing: [] Met: [] Not Met: [] Adjusted      Long Term Goals: To be achieved in: 12 weeks 2/24/23  1. Disability index score of 65+ on FOTO to assist with reaching prior level of function. [] Progressing: [] Met: [] Not Met: [] Adjusted  2. Patient will demonstrate increased AROM to WNL to allow for proper joint functioning as indicated by patients Functional Deficits. [] Progressing: [] Met: [] Not Met: [] Adjusted  3. Patient will demonstrate an increase in R ankle strength to 4+/5 or greater to allow for proper functional mobility as indicated by patients Functional Deficits. [] Progressing: [] Met: [] Not Met: [] Adjusted  4. Patient will return to ADLs, IADLs and functional activities without increased symptoms or restriction. [] Progressing: [] Met: [] Not Met: [] Adjusted  5. Patient will be able to negotiate stairs with reciprocal gait pattern without reports of TC joint tightness. [] Progressing: [] Met: [] Not Met: [] Adjusted       6. Patient will tolerate walking >1 mile to aid in return to golf and safe negotiation of the course.   [] Progressing: [] Met: [] Not Met: [] Adjusted         Overall Progression Towards Functional goals/ Treatment Progress Update:  [] Patient is progressing as expected towards functional goals listed. [] Progression is slowed due to complexities/Impairments listed. [] Progression has been slowed due to co-morbidities. [x] Plan just implemented, too soon to assess goals progression <30days   [] Goals require adjustment due to lack of progress  [] Patient is not progressing as expected and requires additional follow up with physician  [] Other    Prognosis for POC: [x] Good [] Fair  [] Poor      Patient requires continued skilled intervention: [x] Yes  [] No    Treatment/Activity Tolerance:  [x] Patient able to complete treatment  [] Patient limited by fatigue  [] Patient limited by pain     [] Patient limited by other medical complications  [] Other:     Assessment:  Discussed natural progression of achilles repair and that regaining gastroc/soleus strength takes a significant amount of time. Also discussed that d/t nature of repair there may always be some discrepancies in strength between R and L side. Pt very challenged by eccentric calf raise, unable to perform with LLE off ground, required L foot to stay on ground to act as balance and pt shifted weight to R to lower, even with this modification pt was very challenged, had difficulty performing with straight knees. Pt challenged by increased resistance on ankle 3 way, cued during INV and EV to avoid hip rotation. Pt reported increased muscle fatigue with addition of weight to soleus raises. Pt had TTP through distal portion of gastroc and midportion of soleus with IASTM, mild erythremic response, educated on STM for home. Pt requires PT follow up to address ROM, strength and functional mobility deficits. PLAN: See eval  [x] Continue per plan of care [] Alter current plan (see comments above)  [] Plan of care initiated [] Hold pending MD visit [] Discharge    Electronically signed by:  Raymond Umanzor, PT, DPT, OCS  Physical Therapist  [id number]  Ravi@"[employer]". com    Note: If patient does not return for scheduled/ recommended follow up visits, this note will serve as a discharge from care along with most recent update on progress.

## 2023-01-03 ENCOUNTER — OFFICE VISIT (OUTPATIENT)
Dept: ORTHOPEDIC SURGERY | Age: 68
End: 2023-01-03
Payer: COMMERCIAL

## 2023-01-03 VITALS — WEIGHT: 267 LBS | BODY MASS INDEX: 37.38 KG/M2 | HEIGHT: 71 IN

## 2023-01-03 DIAGNOSIS — M92.61 HAGLUND'S DEFORMITY OF RIGHT HEEL: Primary | ICD-10-CM

## 2023-01-03 DIAGNOSIS — S86.019A AVULSION OF ACHILLES TENDON: ICD-10-CM

## 2023-01-03 PROCEDURE — 99213 OFFICE O/P EST LOW 20 MIN: CPT | Performed by: ORTHOPAEDIC SURGERY

## 2023-01-03 PROCEDURE — 1123F ACP DISCUSS/DSCN MKR DOCD: CPT | Performed by: ORTHOPAEDIC SURGERY

## 2023-01-03 NOTE — PROGRESS NOTES
DIAGNOSIS:  Right foot partal Cesar's calcaneus excision after achilles tendon avulsion injury , 2ry repair Achillis tendon. DATE OF SURGERY:  9/29/2022, Suture bridge    HISTORY OF PRESENT ILLNESS: Alma Rosa Kitchen 79 y.o.  male who came in today for 3 months postoperative visit. The patient denies any significant pain in the right heel. Rates pain a 0/10 VAS and doing very well. He has been WB and is working with physical therapy. He reports he still feels weakness in his right calf and is unable to stand on his toes. He is now able to do stairs normally. .  No numbness or tingling sensation. No fever or Chills. Denies smoking.      Past Medical History:   Diagnosis Date    Atrial fibrillation (HCC)     BPH (benign prostatic hyperplasia)     Urge incontinence        Past Surgical History:   Procedure Laterality Date    ACHILLES TENDON SURGERY Left     ACHILLES TENDON SURGERY Right 9/29/2022    RIGHT ACHILLES SECONDAY RECONSTRUCTION WITH CALCANEUS EXCISION performed by Ranjit Grimes MD at [address] [facility] [address]      2 2 level fusions    REFRACTIVE SURGERY Bilateral        Social History     Socioeconomic History    Marital status:      Spouse name: Not on file    Number of children: Not on file    Years of education: Not on file    Highest education level: Not on file   Occupational History    Not on file   Tobacco Use    Smoking status: Never    Smokeless tobacco: Never   Vaping Use    Vaping Use: Never used   Substance and Sexual Activity    Alcohol use: Yes     Comment: occasional    Drug use: Never    Sexual activity: Yes     Partners: Female   Other Topics Concern    Not on file   Social History Narrative    Not on file     Social Determinants of Health     Financial Resource Strain: Low Risk     Difficulty of Paying Living Expenses: Not hard at all   Food Insecurity: No Food Insecurity    Worried About [address] [employer] in the Last Year: Never true    [facility] in the Last Year: Never true   Transportation Needs: Not on file   Physical Activity: Inactive    Days of Exercise per Week: 0 days    Minutes of Exercise per Session: 0 min   Stress: Not on file   Social Connections: Not on file   Intimate Partner Violence: Not At Risk    Fear of Current or Ex-Partner: No    Emotionally Abused: No    Physically Abused: No    Sexually Abused: No   Housing Stability: Not on file       Family History   Problem Relation Age of Onset    No Known Problems Mother     No Known Problems Father        Current Outpatient Medications on File Prior to Visit   Medication Sig Dispense Refill    metoprolol succinate (TOPROL XL) 100 MG extended release tablet TAKE 1 TABLET BY MOUTH TWICE A DAY 60 tablet 3    oxybutynin (DITROPAN-XL) 5 MG extended release tablet Take 1 tablet by mouth daily 90 tablet 1    tamsulosin (FLOMAX) 0.4 MG capsule Take 1 capsule by mouth daily 90 capsule 1    rivaroxaban (XARELTO) 20 MG TABS tablet Take 1 tablet by mouth Daily with supper 30 tablet 5     No current facility-administered medications on file prior to visit. Pertinent items are noted in HPI  Review of systems reviewed from Patient History Form and available in the patient's chart under the Media tab. No change noted. PHYSICAL EXAMINATION:  Mr. Renetta Lan is a very pleasant 79 y.o.  male who presents today in no acute distress, awake, alert, and oriented. He is well dressed, nourished and  groomed. Patient with normal affect. Height is  5' 11\" (1.803 m), weight is 267 lb (121.1 kg), Body mass index is 37.24 kg/m². Resting respiratory rate is 16. The patient walks with no limp. The incision healing well . No signs of any erythema or drainage, moderate swelling. He has no pain with the active or passive range of motion of the right ankle and subtalar, but decrease ROM. He has intact sensation distally, and he is neurovascularly intact.   There is palpable thickening mid substance Achilles tendon. Ankle reflex 1+ bilaterally. Good strength, and no instability both upper and lower extremities. IMAGING:  Two views right calcaneus taken today in the office showed removal of Cesar's, no acute fracture. There is calcification in the Achilles tendon. IMPRESSION: 3 months out from right foot partal Cesar's calcaneus excision, 2ry repair Achillis tendon, and doing very well. PLAN:He will be WBAT and continue to work on  ROM and peroneal strengthening exercise. He was instructed to continue to work with physical therapy on strengthening and stretching exercises. He can go back to normal activity with no restrictions. He will be seen in 3 months and will repeat x-ray at that time. I told the patient that it is not unusual to have some achy pain and swelling for up to a year after a fracture.       Inez Kaba MD

## 2023-01-06 ENCOUNTER — HOSPITAL ENCOUNTER (OUTPATIENT)
Dept: PHYSICAL THERAPY | Age: 68
Setting detail: THERAPIES SERIES
Discharge: HOME | End: 2023-01-06
Payer: COMMERCIAL

## 2023-01-06 PROCEDURE — 97140 MANUAL THERAPY 1/> REGIONS: CPT | Performed by: PHYSICAL THERAPIST

## 2023-01-06 PROCEDURE — 97530 THERAPEUTIC ACTIVITIES: CPT | Performed by: PHYSICAL THERAPIST

## 2023-01-06 PROCEDURE — 97110 THERAPEUTIC EXERCISES: CPT | Performed by: PHYSICAL THERAPIST

## 2023-01-06 NOTE — PLAN OF CARE
The [facility] and [address],  Sports Performance and Rehabilitation, [facility] [address]  [address] [facility],5Th Floor   Ariana Junior  Phone: [phone]  Fax: [phone]      Physical Therapy Daily Treatment Note  Date:  2023    Patient Name:  Irma Melchor    :  1955  MRN: [id number]  Restrictions/Precautions:    Medical/Treatment Diagnosis Information:  Achilles tendon tear, right, subsequent encounter [S86.011D]  Treatment Diagnosis: M25.671, R53.1, R26.2  S/p Achilles repair DOS 22  Insurance/Certification information:  PT Insurance Information: UMR/BMN/no Jailene Matthews  Physician Information:  Tee Wolf MD    Has the plan of care been signed (Y/N):        [x]  Yes  []  No     Date of Patient follow up with Physician: 23 with CNP      Is this a Progress Report:     [x]  Yes  []  No        If Yes:  Date Range for reporting period:  Beginning 22  Ending 23    Progress report will be due (10 Rx or 30 days whichever is less): 3/4/64      Recertification will be due (POC Duration  / 90 days whichever is less): 22   Pt is 14 weeks po and doing well. Remains challenged by calf strength, though this is expected at this time frame from surgery. Pt exhibits improving tolerance to ADLs and IADLs. His calf cramping as resolved. Pt tolerated Alter G walking at 90% well, symmetrical WBing and relatively equal stride length, slight longer stride on L side. Pt tolerated calf raises in Alter G, he was able to complete SL calf raise at 20%, able to lift heel about 1/2 inch from ground, also tolerated eccentrics well at 20% and was able to achieve greater PF in DL position, good control with eccentric but reported challenge. Pt stated he felt really good in the Alter G. Pt remains challenged by tandem stance on airex. Reported fatigue with step ups. Pt requires PT follow up to address ROM, strength and functional mobility deficits.       Visit # Insurance Allowable Auth Required   3 MED NEC []  Yes [x]  No      OUTCOME MEASURE DATE SCORE   FOTO 12/2/22 43   FOTO 1/6/23 54        Latex Allergy:  [x]NO      []YES  Preferred Language for Healthcare:   [x]English       []other:      Pain level:  0/10     SUBJECTIVE:  14 weeks po. Pt states that he is feeling better, he has not had any more calf cramping. Feels he is walking better. HEP remains challenging. Saw MD yesterday, pleased with progress and told him it could be a year before calf strength comes back. OBJECTIVE:           ROM PROM AROM Comments     Left Right Left Right     Dorsiflexion      6    Plantarflexion      50     Inversion      35     Eversion      5                                    Strength Left Right Comments   Dorsiflexion   3+/5     Plantarflexion   3+/5     Inversion   3+/5     Eversion   3+/5        Reflexes/Sensation:               []Dermatomes/Myotomes intact               []Reflexes equal and normal bilaterally              []Other:     Joint mobility:               []Normal                      [x]Hypo              []Hyper     Palpation: TTP over incision     Functional Mobility/Transfers: Independent; difficulty with some ADLs d/t joint hypomobility     Posture:  WNL     Bandages/Dressings/Incisions:   12/2/22 incision healing well. + bump distal to Achilles, pt states that CNP told him it would be come less prominent over time     RESTRICTIONS/PRECAUTIONS: ROM to tolerance    Exercises/Interventions:     Exercise/Equipment Resistance/Repetitions Other comments   Stretching     Gastroc stretch 3x30\" incline board    Soleus stretch 3x30\" incline board    Self TC mob on chair x15 Perform before stretching   [place] board seated DF/PF, inv/ev, cw/ccw Cue for ankle motion not knee   Seated HR/TR    Seated inv/Ev                                  Isometrics     Dorsiflexion     Plantarflexion     Inversion    Eversion         PRE's     Toe curls into towel    Toe spreading Dorsiflexion     Plantarflexion  Soleus pump   Cues to bend @ MTP   Inversion    Eversion    Heel walk    Toe walk    SLR    Calf Raises Focus on great toe ext   Heel tap         Knee Extension     Hamstring Curls     Leg Press          Balance:     Rocker Board     BOSU     SLS     Aeromat     Foam Roll     Plyoback     Tandem Stance 3x30\" R/L airex   Standing hip abd HEP, posture cues, R>L hip fatigue   Biodex          Bike     Alter G 10' walking self selected pace, 90% WBing    3x10 DL calf raise, 30%  X15 SL calf raise, 20%  2x10 eccentric calf raise, 20%           Manual interventions     PROM 4 ways    Scar massage    Jt mobs: post talar, calc inv/ev, midfoot rotation, 1st ray PF    IASTM 8' gastroc/soleus HG 8: sweeping  HG 9: brushing       Therapeutic Exercise and NMR EXR  [x] (51626) Provided verbal/tactile cueing for activities related to strengthening, flexibility, endurance, ROM for improvements in LE, proximal hip, and core control with self care, mobility, lifting, ambulation.  [] (34521) Provided verbal/tactile cueing for activities related to improving balance, coordination, kinesthetic sense, posture, motor skill, proprioception  to assist with LE, proximal hip, and core control in self care, mobility, lifting, ambulation and eccentric single leg control.      NMR and Therapeutic Activities:    [] (59696 or 56571) Provided verbal/tactile cueing for activities related to improving balance, coordination, kinesthetic sense, posture, motor skill, proprioception and motor activation to allow for proper function of core, proximal hip and LE with self care and ADLs  [] (71269) Gait Re-education- Provided training and instruction to the patient for proper LE, core and proximal hip recruitment and positioning and eccentric body weight control with ambulation re-education including up and down stairs     Home Exercise Program:    Access Code: [email or web address]; pt logs in online- does not need print out for updates  URL: [email or web address]  Updated: 12/29/2022  Prepared by: Arlyn Hait    Exercises  Standing Ankle Dorsiflexion Stretch on Chair - 2 x daily - 7 x weekly - 1 sets - 15 reps  Standing Gastroc Stretch on Step - 2 x daily - 7 x weekly - 1 sets - 5 reps - 30 second hold  Standing Soleus Stretch on Step - 2 x daily - 7 x weekly - 1 sets - 5 reps - 30 second hold  Seated Ankle Plantarflexion Dorsiflexion PROM - 2 x daily - 7 x weekly - 1 sets - 5 reps - 30 second hold  Long Sitting Ankle Plantar Flexion with Resistance - 2 x daily - 7 x weekly - 3 sets - 10 reps  Long Sitting Ankle Inversion with Resistance - 2 x daily - 7 x weekly - 3 sets - 10 reps  Long Sitting Ankle Eversion with Resistance - 2 x daily - 7 x weekly - 3 sets - 10 reps  Seated Heel Raise - 2 x daily - 7 x weekly - 3 sets - 10 reps  Seated Heel Toe Raises - 2 x daily - 7 x weekly - 3 sets - 10 reps  Standing Eccentric Heel Raise - 1 x daily - 7 x weekly - 3 sets - 5 reps  Standing Heel Raise - 2 x daily - 7 x weekly - 3 sets - 10 reps  Seated Toe Towel Scrunches - 2 x daily - 7 x weekly - 3 sets - 10 reps  Toe Spreading - 2 x daily - 7 x weekly - 3 sets - 10 reps  Tandem Stance - 2 x daily - 7 x weekly - 3 sets - 30 seconds hold  Side Step Down with Counter Support - 2 x daily - 7 x weekly - 3 sets - 10 reps  Calf Mobilization with Small Ball - 1 x daily - 7 x weekly - 1 sets - 1 reps - 3-5 min hold    [x] (66631) Reviewed/Progressed HEP activities related to strengthening, flexibility, endurance, ROM of core, proximal hip and LE for functional self-care, mobility, lifting and ambulation/stair navigation   [] (88045)Reviewed/Progressed HEP activities related to improving balance, coordination, kinesthetic sense, posture, motor skill, proprioception of core, proximal hip and LE for self care, mobility, lifting, and ambulation/stair navigation      Manual Treatments:    [x] (06058) Provided manual therapy to mobilize LE, proximal hip and/or LS spine soft tissue/joints for the purpose of modulating pain, promoting relaxation,  increasing ROM, reducing/eliminating soft tissue swelling/inflammation/restriction, improving soft tissue extensibility and allowing for proper ROM for normal function with self care, mobility, lifting and ambulation. Modalities:    Charges:  Timed Code Treatment Minutes: 45   Total Treatment Minutes: 45   Time in: 7:45  Time out: 8:40    [] EVAL (LOW) 98662 (typically 20 minutes face-to-face)  [] EVAL (MOD) 81237 (typically 30 minutes face-to-face)  [] EVAL (HIGH) 81374 (typically 45 minutes face-to-face)  [] RE-EVAL     [x] MH(79249) x 1    [] IONTO  [] NMR (29431) x     [] VASO  [x] Manual (54106) x1      [] Other:  [x] TA x 1     [] Mech Traction (97226)  [] ES(attended) (35445)      [] ES (un) (96150):     GOALS:   Patient stated goal: get back to golf    [] Progressing: [] Met: [] Not Met: [] Adjusted     Therapist goals for Patient:   Short Term Goals: To be achieved in: 4 weeks 12/30/22  1. Independent in HEP and progression per patient tolerance, in order to prevent re-injury. [] Progressing: [x] Met: [] Not Met: [] Adjusted   2. Patient will have a decrease in pain to facilitate improvement in movement, function, and ADLs as indicated by Functional Deficits. [] Progressing: [x] Met: [] Not Met: [] Adjusted   3. Patient will demonstrate reciprocal gait pattern without limp. [] Progressing: [x] Met: [] Not Met: [] Adjusted      Long Term Goals: To be achieved in: 12 weeks 2/24/23  1. Disability index score of 65+ on FOTO to assist with reaching prior level of function. [] Progressing: [] Met: [] Not Met: [] Adjusted  2. Patient will demonstrate increased AROM to WNL to allow for proper joint functioning as indicated by patients Functional Deficits. [] Progressing: [] Met: [] Not Met: [] Adjusted  3.  Patient will demonstrate an increase in R ankle strength to 4+/5 or greater to allow for proper functional mobility as indicated by patients Functional Deficits. [] Progressing: [] Met: [] Not Met: [] Adjusted  4. Patient will return to ADLs, IADLs and functional activities without increased symptoms or restriction. [] Progressing: [] Met: [] Not Met: [] Adjusted  5. Patient will be able to negotiate stairs with reciprocal gait pattern without reports of TC joint tightness. [] Progressing: [] Met: [] Not Met: [] Adjusted       6. Patient will tolerate walking >1 mile to aid in return to golf and safe negotiation of the course. [] Progressing: [] Met: [] Not Met: [] Adjusted         Overall Progression Towards Functional goals/ Treatment Progress Update:  [] Patient is progressing as expected towards functional goals listed. [] Progression is slowed due to complexities/Impairments listed. [] Progression has been slowed due to co-morbidities. [x] Plan just implemented, too soon to assess goals progression <30days   [] Goals require adjustment due to lack of progress  [] Patient is not progressing as expected and requires additional follow up with physician  [] Other    Prognosis for POC: [x] Good [] Fair  [] Poor      Patient requires continued skilled intervention: [x] Yes  [] No    Treatment/Activity Tolerance:  [x] Patient able to complete treatment  [] Patient limited by fatigue  [] Patient limited by pain     [] Patient limited by other medical complications  [] Other:     Assessment:  Pt is 14 weeks po and doing well. Remains challenged by calf strength, though this is expected at this time frame from surgery. Pt exhibits improving tolerance to ADLs and IADLs. His calf cramping as resolved. Pt tolerated Alter G walking at 90% well, symmetrical WBing and relatively equal stride length, slight longer stride on L side.  Pt tolerated calf raises in Alter G, he was able to complete SL calf raise at 20%, able to lift heel about 1/2 inch from ground, also tolerated eccentrics well at 20% and was able to achieve greater PF in DL position, good control with eccentric but reported challenge. Pt stated he felt really good in the Alter G. Pt remains challenged by tandem stance on airex. Reported fatigue with step ups. Pt requires PT follow up to address ROM, strength and functional mobility deficits. PLAN: See eval  [x] Continue per plan of care [] Alter current plan (see comments above)  [] Plan of care initiated [] Hold pending MD visit [] Discharge    Electronically signed by:  Willie Edgar, PT, DPT, [facility]  Physical Therapist  [id number]  [email or web address]    Note: If patient does not return for scheduled/ recommended follow up visits, this note will serve as a discharge from care along with most recent update on progress.

## 2023-01-10 ENCOUNTER — HOSPITAL ENCOUNTER (OUTPATIENT)
Dept: PHYSICAL THERAPY | Age: 68
Setting detail: THERAPIES SERIES
Discharge: HOME | End: 2023-01-10
Payer: COMMERCIAL

## 2023-01-10 PROCEDURE — 97110 THERAPEUTIC EXERCISES: CPT | Performed by: PHYSICAL THERAPIST

## 2023-01-10 PROCEDURE — 97530 THERAPEUTIC ACTIVITIES: CPT | Performed by: PHYSICAL THERAPIST

## 2023-01-10 NOTE — FLOWSHEET NOTE
The [facility] and [address],  Sports Performance and Rehabilitation, [facility] [address]  [address] [facility],5[address] Floor   Ariana Junior  Phone: [phone]  Fax: [phone]      Physical Therapy Daily Treatment Note  Date:  1/10/2023    Patient Name:  Terri Sue    :  1955  MRN: [id number]  Restrictions/Precautions:    Medical/Treatment Diagnosis Information:  Achilles tendon tear, right, subsequent encounter [S86.011D]  Treatment Diagnosis: M25.671, R53.1, R26.2  S/p Achilles repair DOS   Insurance/Certification information:  PT Insurance Information: UMR/BMN/no Shannan valdez  Physician Information:  Tasha Dumas MD    Has the plan of care been signed (Y/N):        [x]  Yes  []  No     Date of Patient follow up with Physician: 23 with CNP      Is this a Progress Report:     []  Yes  [x]  No        If Yes:  Date Range for reporting period:  Beginning 22  Ending 23    Progress report will be due (10 Rx or 30 days whichever is less): 91      Recertification will be due (POC Duration  / 90 days whichever is less): 22     Visit # Insurance Allowable Auth Required   2022 MED NEC []  Yes [x]  No      OUTCOME MEASURE DATE SCORE   FOTO 22 43   FOTO 23 54        Latex Allergy:  [x]NO      []YES  Preferred Language for Healthcare:   [x]English       []other:      Pain level:  0/10     SUBJECTIVE:  15 weeks po. Pt states he had a lot of pain in his heel on Saturday and , unsure why, feeling better today. Heel pain started Saturday morning, was intermittent, occurred at rest and would last for 10-15' at a time. Pt states he has done about 6 flights of stairs today, felt good.     OBJECTIVE:           ROM PROM AROM Comments     Left Right Left Right     Dorsiflexion      6    Plantarflexion      50     Inversion      35     Eversion      5                                    Strength Left Right Comments   Dorsiflexion   3+/5 Plantarflexion   3+/5     Inversion   3+/5     Eversion   3+/5        Reflexes/Sensation:               []Dermatomes/Myotomes intact               []Reflexes equal and normal bilaterally              []Other:     Joint mobility:               []Normal                      [x]Hypo              []Hyper     Palpation: TTP over incision     Functional Mobility/Transfers: Independent; difficulty with some ADLs d/t joint hypomobility     Posture:  WNL     Bandages/Dressings/Incisions:   12/2/22 incision healing well. + bump distal to Achilles, pt states that CNP told him it would be come less prominent over time     RESTRICTIONS/PRECAUTIONS: ROM to tolerance    Exercises/Interventions:     Exercise/Equipment Resistance/Repetitions Other comments   Stretching     Gastroc stretch 3x30\" incline board    Soleus stretch 3x30\" incline board    Self TC mob on chair x15 Perform before stretching   [place] board seated DF/PF, inv/ev, cw/ccw Cue for ankle motion not knee   Seated HR/TR    Seated inv/Ev                                  Isometrics     Dorsiflexion     Plantarflexion     Inversion    Eversion         PRE's     Toe curls into towel    Toe spreading    Dorsiflexion     Plantarflexion  Soleus pump   Cues to bend @ MTP   Inversion    Eversion    Heel walk    Toe walk    SLR    Calf Raises Focus on great toe ext   Heel tap    Step ups         Knee Extension     Hamstring Curls     Leg Press          Balance:     Rocker Board     BOSU     SLS     Aeromat     Foam Roll     Plyoback     Tandem Stance 3x30\" R/L airex   Standing hip abd HEP, posture cues, R>L hip fatigue   Biodex          Bike     Alter G 10' walking self selected pace, 90% WBing    2x10 DL calf raise, 20%  3x5 eccentric calf raise, 20%           Manual interventions     PROM 4 ways    Scar massage    Jt mobs: post talar, calc inv/ev, midfoot rotation, 1st ray PF    IASTM 5' gastroc/soleus HG 8: sweeping  HG 9: brushing       Therapeutic Exercise and NMR EXR  [x] (46587) Provided verbal/tactile cueing for activities related to strengthening, flexibility, endurance, ROM for improvements in LE, proximal hip, and core control with self care, mobility, lifting, ambulation.  [] (54776) Provided verbal/tactile cueing for activities related to improving balance, coordination, kinesthetic sense, posture, motor skill, proprioception  to assist with LE, proximal hip, and core control in self care, mobility, lifting, ambulation and eccentric single leg control. NMR and Therapeutic Activities:    [] (70864 or 70644) Provided verbal/tactile cueing for activities related to improving balance, coordination, kinesthetic sense, posture, motor skill, proprioception and motor activation to allow for proper function of core, proximal hip and LE with self care and ADLs  [] (50708) Gait Re-education- Provided training and instruction to the patient for proper LE, core and proximal hip recruitment and positioning and eccentric body weight control with ambulation re-education including up and down stairs     Home Exercise Program:    Access Code: [email or web address]; pt logs in online- does not need print out for updates  URL: [email or web address]  Updated: 12/29/2022  Prepared by: Jacki Martinez    Exercises  Standing Ankle Dorsiflexion Stretch on Chair - 2 x daily - 7 x weekly - 1 sets - 15 reps  Standing Gastroc Stretch on Step - 2 x daily - 7 x weekly - 1 sets - 5 reps - 30 second hold  Standing Soleus Stretch on Step - 2 x daily - 7 x weekly - 1 sets - 5 reps - 30 second hold  Seated Ankle Plantarflexion Dorsiflexion PROM - 2 x daily - 7 x weekly - 1 sets - 5 reps - 30 second hold  Long Sitting Ankle Plantar Flexion with Resistance - 2 x daily - 7 x weekly - 3 sets - 10 reps  Long Sitting Ankle Inversion with Resistance - 2 x daily - 7 x weekly - 3 sets - 10 reps  Long Sitting Ankle Eversion with Resistance - 2 x daily - 7 x weekly - 3 sets - 10 reps  Seated Heel Raise - 2 x daily - 7 x weekly - 3 sets - 10 reps  Seated Heel Toe Raises - 2 x daily - 7 x weekly - 3 sets - 10 reps  Standing Eccentric Heel Raise - 1 x daily - 7 x weekly - 3 sets - 5 reps  Standing Heel Raise - 2 x daily - 7 x weekly - 3 sets - 10 reps  Seated Toe Towel Scrunches - 2 x daily - 7 x weekly - 3 sets - 10 reps  Toe Spreading - 2 x daily - 7 x weekly - 3 sets - 10 reps  Tandem Stance - 2 x daily - 7 x weekly - 3 sets - 30 seconds hold  Side Step Down with Counter Support - 2 x daily - 7 x weekly - 3 sets - 10 reps  Calf Mobilization with Small Ball - 1 x daily - 7 x weekly - 1 sets - 1 reps - 3-5 min hold    [x] (10051) Reviewed/Progressed HEP activities related to strengthening, flexibility, endurance, ROM of core, proximal hip and LE for functional self-care, mobility, lifting and ambulation/stair navigation   [] (52011)Reviewed/Progressed HEP activities related to improving balance, coordination, kinesthetic sense, posture, motor skill, proprioception of core, proximal hip and LE for self care, mobility, lifting, and ambulation/stair navigation      Manual Treatments:    [x] (91091) Provided manual therapy to mobilize LE, proximal hip and/or LS spine soft tissue/joints for the purpose of modulating pain, promoting relaxation,  increasing ROM, reducing/eliminating soft tissue swelling/inflammation/restriction, improving soft tissue extensibility and allowing for proper ROM for normal function with self care, mobility, lifting and ambulation.      Modalities:    Charges:  Timed Code Treatment Minutes: 40   Total Treatment Minutes: 40   Time in: 4:55  Time out: 5:40    [] EVAL (LOW) 63816 (typically 20 minutes face-to-face)  [] EVAL (MOD) 63444 (typically 30 minutes face-to-face)  [] EVAL (HIGH) 50043 (typically 45 minutes face-to-face)  [] RE-EVAL     [x] ZT(40155) x 2    [] IONTO  [] NMR (57369) x     [] VASO  [] Manual (05044) x      [] Other:  [x] TA x 1     [] Mech Traction (48935)  [] ES(attended) (31953)      [] ES (un) (89455):     GOALS:   Patient stated goal: get back to golf    [] Progressing: [] Met: [] Not Met: [] Adjusted     Therapist goals for Patient:   Short Term Goals: To be achieved in: 4 weeks 12/30/22  1. Independent in HEP and progression per patient tolerance, in order to prevent re-injury. [] Progressing: [x] Met: [] Not Met: [] Adjusted   2. Patient will have a decrease in pain to facilitate improvement in movement, function, and ADLs as indicated by Functional Deficits. [] Progressing: [x] Met: [] Not Met: [] Adjusted   3. Patient will demonstrate reciprocal gait pattern without limp. [] Progressing: [x] Met: [] Not Met: [] Adjusted      Long Term Goals: To be achieved in: 12 weeks 2/24/23  1. Disability index score of 65+ on FOTO to assist with reaching prior level of function. [] Progressing: [] Met: [] Not Met: [] Adjusted  2. Patient will demonstrate increased AROM to WNL to allow for proper joint functioning as indicated by patients Functional Deficits. [] Progressing: [] Met: [] Not Met: [] Adjusted  3. Patient will demonstrate an increase in R ankle strength to 4+/5 or greater to allow for proper functional mobility as indicated by patients Functional Deficits. [] Progressing: [] Met: [] Not Met: [] Adjusted  4. Patient will return to ADLs, IADLs and functional activities without increased symptoms or restriction. [] Progressing: [] Met: [] Not Met: [] Adjusted  5. Patient will be able to negotiate stairs with reciprocal gait pattern without reports of TC joint tightness. [] Progressing: [] Met: [] Not Met: [] Adjusted       6. Patient will tolerate walking >1 mile to aid in return to golf and safe negotiation of the course. [] Progressing: [] Met: [] Not Met: [] Adjusted         Overall Progression Towards Functional goals/ Treatment Progress Update:  [] Patient is progressing as expected towards functional goals listed.     [] Progression is slowed due to complexities/Impairments listed. [] Progression has been slowed due to co-morbidities. [x] Plan just implemented, too soon to assess goals progression <30days   [] Goals require adjustment due to lack of progress  [] Patient is not progressing as expected and requires additional follow up with physician  [] Other    Prognosis for POC: [x] Good [] Fair  [] Poor      Patient requires continued skilled intervention: [x] Yes  [] No    Treatment/Activity Tolerance:  [x] Patient able to complete treatment  [] Patient limited by fatigue  [] Patient limited by pain     [] Patient limited by other medical complications  [] Other:     Assessment:  TTP at base of achilles and bottom of heel. Decreased quantity of calf raises in Alter G and will assess pt response in event that this is what caused heel pain. Performed all calf raises at 20% WBing and decreased reps performed, also performed before walking rather than after. Held step ups as pt did 6 flights throughout day. Pt reported that his ankle felt looser at end of the session. Pt requires PT follow up to address ROM, strength and functional mobility deficits. PLAN: See eval  [x] Continue per plan of care [] Alter current plan (see comments above)  [] Plan of care initiated [] Hold pending MD visit [] Discharge    Electronically signed by:  Nury Boykin, PT, DPT, OCS  Physical Therapist  [email or web address]  [email or web address]    Note: If patient does not return for scheduled/ recommended follow up visits, this note will serve as a discharge from care along with most recent update on progress.

## 2023-01-13 ENCOUNTER — HOSPITAL ENCOUNTER (OUTPATIENT)
Dept: PHYSICAL THERAPY | Age: 68
Setting detail: THERAPIES SERIES
Discharge: HOME | End: 2023-01-13
Payer: COMMERCIAL

## 2023-01-13 PROCEDURE — 97530 THERAPEUTIC ACTIVITIES: CPT | Performed by: PHYSICAL THERAPIST

## 2023-01-13 PROCEDURE — 97110 THERAPEUTIC EXERCISES: CPT | Performed by: PHYSICAL THERAPIST

## 2023-01-13 ASSESSMENT — ENCOUNTER SYMPTOMS
ABDOMINAL PAIN: 0
VOMITING: 0

## 2023-01-13 NOTE — PROGRESS NOTES
[facility]     Outpatient Cardiology         Patient Name:  Trista Chance  Requesting Physician: Rica Kaye MD   Primary Care Physician: Rica Kaye MD    Reason for Consultation/Chief Complaint:   Chief Complaint   Patient presents with    Follow-up         HPI:     77 y.o. male presents here today for follow up. PMH of afib for the past 15 years, previously seen by Dr. Anna Morales, practice closed      Echo, 12/14/22, LVEF 50-55%. Mild to moderate mitral regurgitation is present. Bi-atrial enlargement. Mild tricuspid regurgitation. Mild pulmonic regurgitation present. Stress Test, 12/13/22,  The LVEF is normal and the LV wall motion is normal. There is a small-sized, moderate intensity, reversible defect of the inferior wall that is consistent with ischemia. Histories:     Past Medical History:   has a past medical history of Atrial fibrillation ([address] BPH (benign prostatic hyperplasia), and Urge incontinence. Surgical History:   has a past surgical history that includes Cervical spine surgery; Achilles tendon surgery (Left); Refractive surgery (Bilateral); and Achilles tendon surgery (Right, 9/29/2022). Social History:   reports that he has never smoked. He has never used smokeless tobacco. He reports current alcohol use. He reports that he does not use drugs. Family History:  No evidence for sudden cardiac death or premature CAD    Medications:     Home Medications:  Were reviewed and are listed in nursing record. and/or listed below    Prior to Admission medications    Medication Sig Start Date End Date Taking?  Authorizing Provider   aspirin EC 81 MG EC tablet Take 1 tablet by mouth daily 1/20/23  Yes Marely Law MD   rivaroxaban (XARELTO) 20 MG TABS tablet Take 1 tablet by mouth Daily with supper 1/20/23  Yes Marely Law MD   metoprolol succinate (TOPROL XL) 100 MG extended release tablet TAKE 1 TABLET BY MOUTH TWICE A DAY 12/27/22  Yes John Benavides MD   oxybutynin (DITROPAN-XL) 5 MG extended release tablet Take 1 tablet by mouth daily 12/7/22  Yes Erick Garcia MD   tamsulosin (FLOMAX) 0.4 MG capsule Take 1 capsule by mouth daily 12/7/22  Yes Erick Garcia MD        Allergy:     Patient has no known allergies. Review of Systems:     Review of Systems   Constitutional:  Negative for chills and fever. Respiratory:          See HPI   Cardiovascular:         See HPI. Gastrointestinal:  Negative for abdominal pain and vomiting. Endocrine: Negative. Genitourinary: Negative. Skin: Negative. Psychiatric/Behavioral: Negative. All other systems reviewed and are negative. Physical Examination:     Vitals:    01/20/23 0827   BP: 100/62   Pulse: 92   SpO2: 98%   Weight: 268 lb (121.6 kg)   Height: 5' 11\" (1.803 m)         Wt Readings from Last 3 Encounters:   01/20/23 268 lb (121.6 kg)   01/03/23 267 lb (121.1 kg)   12/07/22 267 lb 6.4 oz (121.3 kg)       Physical Exam  Constitutional:       Appearance: Normal appearance. HENT:      Head: Normocephalic and atraumatic. Nose: Nose normal.   Eyes:      Conjunctiva/sclera: Conjunctivae normal.   Cardiovascular:      Rate and Rhythm: Normal rate. Rhythm irregular. Heart sounds: Normal heart sounds. Pulmonary:      Effort: Pulmonary effort is normal.      Breath sounds: Normal breath sounds. Abdominal:      Palpations: Abdomen is soft. Musculoskeletal:      Cervical back: Neck supple. Skin:     General: Skin is warm and dry. Neurological:      General: No focal deficit present. Mental Status: He is alert.          Labs:     Lab Results   Component Value Date    WBC 6.4 05/12/2022    HGB 13.6 05/12/2022    HCT 40.1 (L) 05/12/2022    MCV 91.7 05/12/2022     05/12/2022     Lab Results   Component Value Date     05/12/2022    K 4.4 05/12/2022     05/12/2022    CO2 23 05/12/2022    BUN 16 05/12/2022    CREATININE 0.9 05/12/2022    GLUCOSE 91 05/12/2022    CALCIUM 9.1 05/12/2022    PROT 7.1 05/12/2022    LABALBU 4.2 05/12/2022    BILITOT 0.6 05/12/2022    ALKPHOS 52 05/12/2022    AST 14 (L) 05/12/2022    ALT 11 05/12/2022    LABGLOM >60 05/12/2022    GFRAA >60 05/12/2022    AGRATIO 1.4 05/12/2022         No results found for: CHOL  No results found for: TRIG  Lab Results   Component Value Date    HDL 41 05/12/2022     Lab Results   Component Value Date    LDLCALC 98 05/12/2022     Lab Results   Component Value Date    LABVLDL 16 05/12/2022     No results found for: CHOLHDLRATIO    No results found for: INR, PROTIME    The 10-year ASCVD risk score (Noemí VICTORIA, et al., 2019) is: 9.3%    Values used to calculate the score:      Age: 79 years      Sex: Male      Is Non- : No      Diabetic: No      Tobacco smoker: No      Systolic Blood Pressure: 879 mmHg      Is BP treated: No      HDL Cholesterol: 41 mg/dL      Total Cholesterol: 155 mg/dL      Imaging:       ECG (if available, Personally interpreted):    Last Monitor/Holter (if available):    Last Stress (if available): 12/13/22    Summary   There is a small-sized, moderate intensity, reversible defect of the   inferior wall that is consistent with ischemia. The LVEF is normal and the LV wall motion is normal.    This is an intermediate risk cardiac scan. 1/6/20  Final Conclusions/Summary     Stress ECG Impressions:  Normal exercise stress test.     Summary:  Normal coronary perfusion. Preserved biventricular global and regional systolic function, with an estimated LVEF of approx 60%. Baseline hypertension. Last Cath (if available):    Last TTE/KEON(if available): 12/14/22   Summary   Left ventricular cavity size is normal.   Normal left ventricular wall thickness. Left ventricular function is normal with ejection fraction estimated at   50-55%. No regional wall motion abnormalities are noted. Diastolic function cannot be assessed due to an arrhythmia.    Mild to moderate mitral regurgitation is present. Bi-atrial enlargement. Mild tricuspid regurgitation. Mild pulmonic regurgitation present. Estimated pulmonary artery systolic pressure is at 27 mmHg assuming a right   atrial pressure of 3 mmHg. 1/6/20  Findings:     ~Left Ventricle: Normal left ventricle size. Borderline left ventricular hypertrophy. Normal left ventricular systolic function. The ejection fraction is visually estimated to be 60 %. Normal regional wall motion. ~Right Ventricle: Normal in size and function. ~Left Atrium: Mild to moderate enlargement of the left atrium. Atrial septal thickening is noted. ~Right Atrium: Mild enlargement of the right atrium. ~Mitral Valve: Normal mitral valve structure and mobility. Mild mitral valve regurgitation. ~Aortic Valve: Tri-leaflet aortic valve. Normal aortic valve structure and mobility. ~Tricuspid Valve: Normal tricuspid valve structure and mobility. Mild to moderate tricuspid valve regurgitation. The pulmonary artery pressure is normal.     ~Pulmonic Valve: Pulmonary valve not well visualized. Mild pulmonic regurgitation. ~Great Vessels: Normal size aorta. Normal IVC size and normal respiratory collapse consistent with normal right atrial pressure. ~Pericardium: No pericardial effusion. Last CMR  (if available):    Last Coronary Artery Calcium Score: Ankle-brachial index:    Carotid ultrasound screening:    Abdominal aortic aneurysm screening:    Assessment / Plan:     1. Abnormal cardiovascular stress test    2. Dyspnea, unspecified type    3. Persistent atrial fibrillation ([address]    4.  Anticoagulated      There is a small-sized, moderate intensity, reversible defect of the   inferior wall that is consistent with ischemia on nuclear stress test.  ASA daily, betablocker daily  LHC, possible PCI  Risks, benefits and alternatives to cardiac cath and possible intervention were discussed with the patient including bleeding, heart attack, stroke, kidney failure and limb loss. Will need to hold xarelto per protocol if wished to proceed with cardiac cath   EP referral     No orders of the defined types were placed in this encounter. Return in about 4 weeks (around 2/17/2023). The note was completed using EMR and Dragon dictation system. Every effort was made to ensure accuracy; however, inadvertent computerized transcription errors may be present. All questions and concerns were addressed to the patient. I would like to thank you for providing me the opportunity to participate in the care of your patient. If you have any questions, please do not hesitate to contact me.      Reuben Thrasher MD, [facility] [address]  [address] [place] [id number]  Main Office Phone: [phone]  Fax: [phone]

## 2023-01-13 NOTE — FLOWSHEET NOTE
[facility] and [address],  Sports Performance and [facility], [facility] [address] 12[address]  [address] [facility],[address]   Ariana Junior  Phone: [phone]  Fax: [phone]      Physical Therapy Daily Treatment Note  Date:  2023    Patient Name:  Jessica Cha    :  1955  MRN: [id number]  Restrictions/Precautions:    Medical/Treatment Diagnosis Information:  Achilles tendon tear, right, subsequent encounter [S86.011D]  Treatment Diagnosis: M25.671, R53.1, R26.2  S/p Achilles repair DOS 64  Insurance/Certification information:  PT Insurance Information: UMR/BMN/no Rashi valdez  Physician Information:  Judy Clinton MD    Has the plan of care been signed (Y/N):        [x]  Yes  []  No     Date of Patient follow up with Physician: 23 with CNP      Is this a Progress Report:     []  Yes  [x]  No        If Yes:  Date Range for reporting period:  Beginning 22  Ending 23    Progress report will be due (10 Rx or 30 days whichever is less):       Recertification will be due (POC Duration  / 90 days whichever is less): 22     Visit # Insurance Allowable Auth Required   3  2022 MED NEC []  Yes [x]  No      OUTCOME MEASURE DATE SCORE   FOTO 22 43   FOTO 23 54        Latex Allergy:  [x]NO      []YES  Preferred Language for Healthcare:   [x]English       []other:      Pain level:  0/10     SUBJECTIVE:  15 weeks po. Pt states that he is feeling better. Walking is feeling easier, feels more fluid. Did not have heel pain following LPV. Also easier going up and down the stairs.     OBJECTIVE:           ROM PROM AROM Comments     Left Right Left Right     Dorsiflexion      6    Plantarflexion      50     Inversion      35     Eversion      5                                    Strength Left Right Comments   Dorsiflexion   3+/5     Plantarflexion   3+/5     Inversion   3+/5     Eversion   3+/5        Reflexes/Sensation: []Dermatomes/Myotomes intact               []Reflexes equal and normal bilaterally              []Other:     Joint mobility:               []Normal                      [x]Hypo              []Hyper     Palpation: TTP over incision     Functional Mobility/Transfers: Independent; difficulty with some ADLs d/t joint hypomobility     Posture:  WNL     Bandages/Dressings/Incisions:   12/2/22 incision healing well. + bump distal to Achilles, pt states that CNP told him it would be come less prominent over time     RESTRICTIONS/PRECAUTIONS: ROM to tolerance    Exercises/Interventions:     Exercise/Equipment Resistance/Repetitions Other comments   Stretching     Gastroc stretch 3x30\" incline board    Soleus stretch 3x30\" incline board    Self TC mob on chair Perform before stretching   [place] board seated DF/PF, inv/ev, cw/ccw Cue for ankle motion not knee   Seated HR/TR    Seated inv/Ev                                  Isometrics     Dorsiflexion     Plantarflexion     Inversion    Eversion         PRE's     Toe curls into towel    Toe spreading    Dorsiflexion     Plantarflexion  Soleus pump   Cues to bend @ MTP   Inversion    Eversion    Heel walk    Toe walk    SLR    Calf Raises Focus on great toe ext   Heel tap 3x10 4\"    Step ups         Knee Extension     Hamstring Curls     Leg Press 3x10 #         Balance:     Rocker Board     BOSU     SLS 3x30\"    Aeromat     Foam Roll     Plyoback     Tandem Stance 2x30\" R/L airex   Standing hip abd HEP, posture cues, R>L hip fatigue   Biodex          Bike     Alter G 10' walking self selected pace, 90% WBing    2x10 DL calf raise, 30%  2x10 eccentric calf raise, 20%           Manual interventions     PROM 4 ways    Scar massage    Jt mobs: post talar, calc inv/ev, midfoot rotation, 1st ray PF    IASTM 5' gastroc/soleus HG 8: sweeping  HG 9: brushing       Therapeutic Exercise and NMR EXR  [x] (91383) Provided verbal/tactile cueing for activities related to strengthening, flexibility, endurance, ROM for improvements in LE, proximal hip, and core control with self care, mobility, lifting, ambulation.  [] (44051) Provided verbal/tactile cueing for activities related to improving balance, coordination, kinesthetic sense, posture, motor skill, proprioception  to assist with LE, proximal hip, and core control in self care, mobility, lifting, ambulation and eccentric single leg control. NMR and Therapeutic Activities:    [] (89781 or 38127) Provided verbal/tactile cueing for activities related to improving balance, coordination, kinesthetic sense, posture, motor skill, proprioception and motor activation to allow for proper function of core, proximal hip and LE with self care and ADLs  [] (97361) Gait Re-education- Provided training and instruction to the patient for proper LE, core and proximal hip recruitment and positioning and eccentric body weight control with ambulation re-education including up and down stairs     Home Exercise Program:    Access Code: [email or web address]; pt logs in online- does not need print out for updates  URL: [email or web address]  Updated: 12/29/2022  Prepared by: Joleen Burkett    Exercises  Standing Ankle Dorsiflexion Stretch on Chair - 2 x daily - 7 x weekly - 1 sets - 15 reps  Standing Gastroc Stretch on Step - 2 x daily - 7 x weekly - 1 sets - 5 reps - 30 second hold  Standing Soleus Stretch on Step - 2 x daily - 7 x weekly - 1 sets - 5 reps - 30 second hold  Seated Ankle Plantarflexion Dorsiflexion PROM - 2 x daily - 7 x weekly - 1 sets - 5 reps - 30 second hold  Long Sitting Ankle Plantar Flexion with Resistance - 2 x daily - 7 x weekly - 3 sets - 10 reps  Long Sitting Ankle Inversion with Resistance - 2 x daily - 7 x weekly - 3 sets - 10 reps  Long Sitting Ankle Eversion with Resistance - 2 x daily - 7 x weekly - 3 sets - 10 reps  Seated Heel Raise - 2 x daily - 7 x weekly - 3 sets - 10 reps  Seated Heel Toe Raises - 2 x daily - 7 x weekly - 3 sets - 10 reps  Standing Eccentric Heel Raise - 1 x daily - 7 x weekly - 3 sets - 5 reps  Standing Heel Raise - 2 x daily - 7 x weekly - 3 sets - 10 reps  Seated Toe Towel Scrunches - 2 x daily - 7 x weekly - 3 sets - 10 reps  Toe Spreading - 2 x daily - 7 x weekly - 3 sets - 10 reps  Tandem Stance - 2 x daily - 7 x weekly - 3 sets - 30 seconds hold  Side Step Down with Counter Support - 2 x daily - 7 x weekly - 3 sets - 10 reps  Calf Mobilization with Small Ball - 1 x daily - 7 x weekly - 1 sets - 1 reps - 3-5 min hold    [x] (91563) Reviewed/Progressed HEP activities related to strengthening, flexibility, endurance, ROM of core, proximal hip and LE for functional self-care, mobility, lifting and ambulation/stair navigation   [] (68891)Reviewed/Progressed HEP activities related to improving balance, coordination, kinesthetic sense, posture, motor skill, proprioception of core, proximal hip and LE for self care, mobility, lifting, and ambulation/stair navigation      Manual Treatments:    [x] (89624) Provided manual therapy to mobilize LE, proximal hip and/or LS spine soft tissue/joints for the purpose of modulating pain, promoting relaxation,  increasing ROM, reducing/eliminating soft tissue swelling/inflammation/restriction, improving soft tissue extensibility and allowing for proper ROM for normal function with self care, mobility, lifting and ambulation.     Modalities:    Charges:  Timed Code Treatment Minutes: 45   Total Treatment Minutes: 45   Time in: 4:27  Time out: 5:17    [] EVAL (LOW) 25662 (typically 20 minutes face-to-face)  [] EVAL (MOD) 99646 (typically 30 minutes face-to-face)  [] EVAL (HIGH) 12927 (typically 45 minutes face-to-face)  [] RE-EVAL     [x] TE(26588) x 2    [] IONTO  [] NMR (18714) x     [] VASO  [] Manual (75290) x      [] Other:  [x] TA x 1     [] Mech Traction (09159)  [] ES(attended) (14164)      [] ES (un) (52789):     GOALS:   Patient stated goal: get back to golf    [] Progressing: []  Met: [] Not Met: [] Adjusted     Therapist goals for Patient:   Short Term Goals: To be achieved in: 4 weeks 12/30/22  1. Independent in HEP and progression per patient tolerance, in order to prevent re-injury. [] Progressing: [x] Met: [] Not Met: [] Adjusted   2. Patient will have a decrease in pain to facilitate improvement in movement, function, and ADLs as indicated by Functional Deficits. [] Progressing: [x] Met: [] Not Met: [] Adjusted   3. Patient will demonstrate reciprocal gait pattern without limp. [] Progressing: [x] Met: [] Not Met: [] Adjusted      Long Term Goals: To be achieved in: 12 weeks 2/24/23  1. Disability index score of 65+ on FOTO to assist with reaching prior level of function. [] Progressing: [] Met: [] Not Met: [] Adjusted  2. Patient will demonstrate increased AROM to WNL to allow for proper joint functioning as indicated by patients Functional Deficits. [] Progressing: [] Met: [] Not Met: [] Adjusted  3. Patient will demonstrate an increase in R ankle strength to 4+/5 or greater to allow for proper functional mobility as indicated by patients Functional Deficits. [] Progressing: [] Met: [] Not Met: [] Adjusted  4. Patient will return to ADLs, IADLs and functional activities without increased symptoms or restriction. [] Progressing: [] Met: [] Not Met: [] Adjusted  5. Patient will be able to negotiate stairs with reciprocal gait pattern without reports of TC joint tightness. [] Progressing: [] Met: [] Not Met: [] Adjusted       6. Patient will tolerate walking >1 mile to aid in return to golf and safe negotiation of the course. [] Progressing: [] Met: [] Not Met: [] Adjusted         Overall Progression Towards Functional goals/ Treatment Progress Update:  [] Patient is progressing as expected towards functional goals listed. [] Progression is slowed due to complexities/Impairments listed. [] Progression has been slowed due to co-morbidities.   [x] Plan just implemented, too soon to assess goals progression <30days   [] Goals require adjustment due to lack of progress  [] Patient is not progressing as expected and requires additional follow up with physician  [] Other    Prognosis for POC: [x] Good [] Fair  [] Poor      Patient requires continued skilled intervention: [x] Yes  [] No    Treatment/Activity Tolerance:  [x] Patient able to complete treatment  [] Patient limited by fatigue  [] Patient limited by pain     [] Patient limited by other medical complications  [] Other:     Assessment:  Improved height of SL calf raise at 20%, as a result increased to 30% WBing for DL calf raise and pt also demo'd an increase in clearance from the treadmill, did note some toe pain on L during DL but was tolerable. Remains fatigued with heel taps, good ankle mobility in eccentric phase. Pt challenged by SLS, no pain but increased use of ankle strategy to maintain balance and increased sway. Pt requires PT follow up to address ROM, strength and functional mobility deficits. PLAN: See eval  [x] Continue per plan of care [] Alter current plan (see comments above)  [] Plan of care initiated [] Hold pending MD visit [] Discharge    Electronically signed by:  Nury Boykin, PT, DPT, OCS  Physical Therapist  [id number]  [email or web address]    Note: If patient does not return for scheduled/ recommended follow up visits, this note will serve as a discharge from care along with most recent update on progress.

## 2023-01-17 ENCOUNTER — HOSPITAL ENCOUNTER (OUTPATIENT)
Dept: PHYSICAL THERAPY | Age: 68
Setting detail: THERAPIES SERIES
Discharge: HOME | End: 2023-01-17
Payer: COMMERCIAL

## 2023-01-17 PROCEDURE — 97110 THERAPEUTIC EXERCISES: CPT | Performed by: PHYSICAL THERAPIST

## 2023-01-17 PROCEDURE — 97530 THERAPEUTIC ACTIVITIES: CPT | Performed by: PHYSICAL THERAPIST

## 2023-01-17 NOTE — FLOWSHEET NOTE
The [facility] and [address],  Sports Performance and Rehabilitation, [facility] 6199 1246 [address]  793 [facility],5Th Floor   [place], [address]  Phone: [phone]  Fax: [phone]      Physical Therapy Daily Treatment Note  Date:  2023    Patient Name:  Janell Matthews    :  1955  MRN: [id number]  Restrictions/Precautions:    Medical/Treatment Diagnosis Information:  Achilles tendon tear, right, subsequent encounter [S86.011D]  Treatment Diagnosis: M25.671, R53.1, R26.2  S/p Achilles repair DOS 22  Insurance/Certification information:  PT Insurance Information: UMR/BMN/no Joelle valdez  Physician Information:  Annetta Dodson MD    Has the plan of care been signed (Y/N):        [x]  Yes  []  No     Date of Patient follow up with Physician: 23 with CNP      Is this a Progress Report:     []  Yes  [x]  No        If Yes:  Date Range for reporting period:  Beginning 22  Ending 23    Progress report will be due (10 Rx or 30 days whichever is less): 3/2/50      Recertification will be due (POC Duration  / 90 days whichever is less): 22     Visit # Insurance Allowable Auth Required   2022 MED NEC []  Yes [x]  No      OUTCOME MEASURE DATE SCORE   FOTO 22 43   FOTO 23 54        Latex Allergy:  [x]NO      []YES  Preferred Language for Healthcare:   [x]English       []other:      Pain level:  0/10     SUBJECTIVE:  15 weeks po. Pt states that his foot is feeling \"normal for me. \" Has not had any more muscle cramping or heel pain. Pt states he did a lot of work on Saturday, was on and off ladders doing some ceiling work, states he felt okay.     OBJECTIVE:           ROM PROM AROM Comments     Left Right Left Right     Dorsiflexion      6    Plantarflexion      50     Inversion      35     Eversion      5                                    Strength Left Right Comments   Dorsiflexion   3+/5     Plantarflexion   3+/5     Inversion   3+/5     Eversion 3+/5        Reflexes/Sensation:               []Dermatomes/Myotomes intact               []Reflexes equal and normal bilaterally              []Other:     Joint mobility:               []Normal                      [x]Hypo              []Hyper     Palpation: TTP over incision     Functional Mobility/Transfers: Independent; difficulty with some ADLs d/t joint hypomobility     Posture:  WNL     Bandages/Dressings/Incisions:   12/2/22 incision healing well. + bump distal to Achilles, pt states that CNP told him it would be come less prominent over time     RESTRICTIONS/PRECAUTIONS: ROM to tolerance    Exercises/Interventions:     Exercise/Equipment Resistance/Repetitions Other comments   Stretching     Gastroc stretch 3x30\" incline board    Soleus stretch 3x30\" incline board    Self TC mob on chair Perform before stretching   [place] board seated DF/PF, inv/ev, cw/ccw Cue for ankle motion not knee   Seated HR/TR    Seated inv/Ev                                  Isometrics     Dorsiflexion     Plantarflexion     Inversion    Eversion         PRE's     Toe curls into towel    Toe spreading    Dorsiflexion     Plantarflexion  Soleus pump   Cues to bend @ MTP   Inversion    Eversion    Heel walk    Toe walk    SLR    Calf Raises Focus on great toe ext   Heel tap 3x10 4\"    Step ups         Knee Extension     Hamstring Curls     Leg Press 3x10 # Seat: 4  Foot: 7        Balance:     Rocker Board     BOSU     SLS 3x30\"    Aeromat     Foam Roll     Plyoback     Tandem Stance 2x30\" R/L airex   Standing hip abd HEP, posture cues, R>L hip fatigue   Biodex          Bike     Alter G 10' walking self selected pace, 90% WBing    4x10 DL calf raise, 35/35/35/50%  3x10 eccentric calf raise, 20%   XL shorts        Manual interventions     PROM 4 ways    Scar massage    Jt mobs: post talar, calc inv/ev, midfoot rotation, 1st ray PF    IASTM HG 8: sweeping  HG 9: brushing       Therapeutic Exercise and NMR EXR  [x] (55846) Provided verbal/tactile cueing for activities related to strengthening, flexibility, endurance, ROM for improvements in LE, proximal hip, and core control with self care, mobility, lifting, ambulation.  [] (02356) Provided verbal/tactile cueing for activities related to improving balance, coordination, kinesthetic sense, posture, motor skill, proprioception  to assist with LE, proximal hip, and core control in self care, mobility, lifting, ambulation and eccentric single leg control. NMR and Therapeutic Activities:    [] (84833 or 28786) Provided verbal/tactile cueing for activities related to improving balance, coordination, kinesthetic sense, posture, motor skill, proprioception and motor activation to allow for proper function of core, proximal hip and LE with self care and ADLs  [] (81540) Gait Re-education- Provided training and instruction to the patient for proper LE, core and proximal hip recruitment and positioning and eccentric body weight control with ambulation re-education including up and down stairs     Home Exercise Program:    Access Code: [email or web address]; pt logs in online- does not need print out for updates  URL: Drive/  Updated: 12/29/2022  Prepared by: Sera Mcnair    Exercises  Standing Ankle Dorsiflexion Stretch on Chair - 2 x daily - 7 x weekly - 1 sets - 15 reps  Standing Gastroc Stretch on Step - 2 x daily - 7 x weekly - 1 sets - 5 reps - 30 second hold  Standing Soleus Stretch on Step - 2 x daily - 7 x weekly - 1 sets - 5 reps - 30 second hold  Seated Ankle Plantarflexion Dorsiflexion PROM - 2 x daily - 7 x weekly - 1 sets - 5 reps - 30 second hold  Long Sitting Ankle Plantar Flexion with Resistance - 2 x daily - 7 x weekly - 3 sets - 10 reps  Long Sitting Ankle Inversion with Resistance - 2 x daily - 7 x weekly - 3 sets - 10 reps  Long Sitting Ankle Eversion with Resistance - 2 x daily - 7 x weekly - 3 sets - 10 reps  Seated Heel Raise - 2 x daily - 7 x weekly - 3 sets - 10 reps  Seated Heel Toe Raises - 2 x daily - 7 x weekly - 3 sets - 10 reps  Standing Eccentric Heel Raise - 1 x daily - 7 x weekly - 3 sets - 5 reps  Standing Heel Raise - 2 x daily - 7 x weekly - 3 sets - 10 reps  Seated Toe Towel Scrunches - 2 x daily - 7 x weekly - 3 sets - 10 reps  Toe Spreading - 2 x daily - 7 x weekly - 3 sets - 10 reps  Tandem Stance - 2 x daily - 7 x weekly - 3 sets - 30 seconds hold  Side Step Down with Counter Support - 2 x daily - 7 x weekly - 3 sets - 10 reps  Calf Mobilization with Small Ball - 1 x daily - 7 x weekly - 1 sets - 1 reps - 3-5 min hold    [x] (25995) Reviewed/Progressed HEP activities related to strengthening, flexibility, endurance, ROM of core, proximal hip and LE for functional self-care, mobility, lifting and ambulation/stair navigation   [] (39776)Reviewed/Progressed HEP activities related to improving balance, coordination, kinesthetic sense, posture, motor skill, proprioception of core, proximal hip and LE for self care, mobility, lifting, and ambulation/stair navigation      Manual Treatments:    [x] (67056) Provided manual therapy to mobilize LE, proximal hip and/or LS spine soft tissue/joints for the purpose of modulating pain, promoting relaxation,  increasing ROM, reducing/eliminating soft tissue swelling/inflammation/restriction, improving soft tissue extensibility and allowing for proper ROM for normal function with self care, mobility, lifting and ambulation.      Modalities:    Charges:  Timed Code Treatment Minutes: 45   Total Treatment Minutes: 45   Time in: 4:17  Time out: 5:05    [] EVAL (LOW) 17499 (typically 20 minutes face-to-face)  [] EVAL (MOD) 06477 (typically 30 minutes face-to-face)  [] EVAL (HIGH) 74124 (typically 45 minutes face-to-face)  [] RE-EVAL     [x] JV(41790) x 2    [] IONTO  [] NMR (29820) x     [] VASO  [] Manual (16916) x      [] Other:  [x] TA x 1     [] Mech Traction (79940)  [] ES(attended) (07458)      [] ES (un) (93412): GOALS:   Patient stated goal: get back to golf    [] Progressing: [] Met: [] Not Met: [] Adjusted     Therapist goals for Patient:   Short Term Goals: To be achieved in: 4 weeks 12/30/22  1. Independent in HEP and progression per patient tolerance, in order to prevent re-injury. [] Progressing: [x] Met: [] Not Met: [] Adjusted   2. Patient will have a decrease in pain to facilitate improvement in movement, function, and ADLs as indicated by Functional Deficits. [] Progressing: [x] Met: [] Not Met: [] Adjusted   3. Patient will demonstrate reciprocal gait pattern without limp. [] Progressing: [x] Met: [] Not Met: [] Adjusted      Long Term Goals: To be achieved in: 12 weeks 2/24/23  1. Disability index score of 65+ on FOTO to assist with reaching prior level of function. [] Progressing: [] Met: [] Not Met: [] Adjusted  2. Patient will demonstrate increased AROM to WNL to allow for proper joint functioning as indicated by patients Functional Deficits. [] Progressing: [] Met: [] Not Met: [] Adjusted  3. Patient will demonstrate an increase in R ankle strength to 4+/5 or greater to allow for proper functional mobility as indicated by patients Functional Deficits. [] Progressing: [] Met: [] Not Met: [] Adjusted  4. Patient will return to ADLs, IADLs and functional activities without increased symptoms or restriction. [] Progressing: [] Met: [] Not Met: [] Adjusted  5. Patient will be able to negotiate stairs with reciprocal gait pattern without reports of TC joint tightness. [] Progressing: [] Met: [] Not Met: [] Adjusted       6. Patient will tolerate walking >1 mile to aid in return to golf and safe negotiation of the course. [] Progressing: [] Met: [] Not Met: [] Adjusted         Overall Progression Towards Functional goals/ Treatment Progress Update:  [] Patient is progressing as expected towards functional goals listed. [] Progression is slowed due to complexities/Impairments listed.   [] Progression has been slowed due to co-morbidities. [x] Plan just implemented, too soon to assess goals progression <30days   [] Goals require adjustment due to lack of progress  [] Patient is not progressing as expected and requires additional follow up with physician  [] Other    Prognosis for POC: [x] Good [] Fair  [] Poor      Patient requires continued skilled intervention: [x] Yes  [] No    Treatment/Activity Tolerance:  [x] Patient able to complete treatment  [] Patient limited by fatigue  [] Patient limited by pain     [] Patient limited by other medical complications  [] Other:     Assessment:  Pt tolerated increased WBing % on DL calf raise to 35%, following 3x10 increased to 50% and pt able to complete 10 reps with good heel clearance though not quite as high as at 35%, no pain at either 5%. Pt tolerated additional set of eccentric calf raises at 20%, showed good heel clearance for all reps, increase WBing at NPV. Pt remains fatigued by balance and strength exercises. Pt requires PT follow up to address ROM, strength and functional mobility deficits. PLAN: See eval  [x] Continue per plan of care [] Alter current plan (see comments above)  [] Plan of care initiated [] Hold pending MD visit [] Discharge    Electronically signed by:  Lor Rae, PT, DPT, OCS  Physical Therapist  [id number]  [email or web address]    Note: If patient does not return for scheduled/ recommended follow up visits, this note will serve as a discharge from care along with most recent update on progress.

## 2023-01-20 ENCOUNTER — HOSPITAL ENCOUNTER (OUTPATIENT)
Dept: PHYSICAL THERAPY | Age: 68
Setting detail: THERAPIES SERIES
Discharge: HOME | End: 2023-01-20
Payer: COMMERCIAL

## 2023-01-20 ENCOUNTER — OFFICE VISIT (OUTPATIENT)
Dept: CARDIOLOGY CLINIC | Age: 68
End: 2023-01-20

## 2023-01-20 VITALS
SYSTOLIC BLOOD PRESSURE: 100 MMHG | OXYGEN SATURATION: 98 % | BODY MASS INDEX: 37.52 KG/M2 | HEART RATE: 92 BPM | WEIGHT: 268 LBS | DIASTOLIC BLOOD PRESSURE: 62 MMHG | HEIGHT: 71 IN

## 2023-01-20 DIAGNOSIS — R06.00 DYSPNEA, UNSPECIFIED TYPE: ICD-10-CM

## 2023-01-20 DIAGNOSIS — R94.39 ABNORMAL CARDIOVASCULAR STRESS TEST: Primary | ICD-10-CM

## 2023-01-20 DIAGNOSIS — I48.19 PERSISTENT ATRIAL FIBRILLATION (HCC): ICD-10-CM

## 2023-01-20 DIAGNOSIS — Z79.01 ANTICOAGULATED: ICD-10-CM

## 2023-01-20 PROCEDURE — 97110 THERAPEUTIC EXERCISES: CPT | Performed by: PHYSICAL THERAPIST

## 2023-01-20 PROCEDURE — 97530 THERAPEUTIC ACTIVITIES: CPT | Performed by: PHYSICAL THERAPIST

## 2023-01-20 RX ORDER — ASPIRIN 81 MG/1
81 TABLET ORAL DAILY
Qty: 90 TABLET | Refills: 1 | Status: SHIPPED | OUTPATIENT
Start: 2023-01-20

## 2023-01-20 NOTE — FLOWSHEET NOTE
The [facility] and [address],  Sports Performance and Rehabilitation, [facility] 6199 [address]  793 [facility],5Th [address]   [place]  Phone: [phone]  Fax: [phone]      Physical Therapy Daily Treatment Note  Date:  2023    Patient Name:  Magdaleno Beltrán    :  1955  MRN: [id number]  Restrictions/Precautions:    Medical/Treatment Diagnosis Information:  Achilles tendon tear, right, subsequent encounter [S86.011D]  Treatment Diagnosis: M25.671, R53.1, R26.2  S/p Achilles repair DOS   Insurance/Certification information:  PT Insurance Information: UMR/BMN/no Nonda Holstein req  Physician Information:  Ivette Barrios MD    Has the plan of care been signed (Y/N):        [x]  Yes  []  No     Date of Patient follow up with Physician: 23 with CNP      Is this a Progress Report:     []  Yes  [x]  No        If Yes:  Date Range for reporting period:  Beginning 22  Ending 23    Progress report will be due (10 Rx or 30 days whichever is less):       Recertification will be due (POC Duration  / 90 days whichever is less): 22     Visit # Insurance Allowable Auth Required   2022 MED NEC []  Yes [x]  No      OUTCOME MEASURE DATE SCORE   FOTO 22 43   FOTO 23 54        Latex Allergy:  [x]NO      []YES  Preferred Language for Healthcare:   [x]English       []other:      Pain level:  0/10     SUBJECTIVE:  15 weeks po. Pt states foot feels good, no changes since LPV, no complaints. Continues to feel he is moving in the right direction.     OBJECTIVE:           ROM PROM AROM Comments     Left Right Left Right     Dorsiflexion      6    Plantarflexion      50     Inversion      35     Eversion      5                                    Strength Left Right Comments   Dorsiflexion   3+/5     Plantarflexion   3+/5     Inversion   3+/5     Eversion   3+/5        Reflexes/Sensation:               []Dermatomes/Myotomes intact []Reflexes equal and normal bilaterally              []Other:     Joint mobility:               []Normal                      [x]Hypo              []Hyper     Palpation: TTP over incision     Functional Mobility/Transfers: Independent; difficulty with some ADLs d/t joint hypomobility     Posture:  WNL     Bandages/Dressings/Incisions:   12/2/22 incision healing well. + bump distal to Achilles, pt states that CNP told him it would be come less prominent over time     RESTRICTIONS/PRECAUTIONS: ROM to tolerance    Exercises/Interventions:     Exercise/Equipment Resistance/Repetitions Other comments   Stretching     Gastroc stretch 3x30\" incline board    Soleus stretch 3x30\" incline board    Self TC mob on chair Perform before stretching   [place] board seated DF/PF, inv/ev, cw/ccw Cue for ankle motion not knee   Seated HR/TR    Seated inv/Ev                                  Isometrics     Dorsiflexion     Plantarflexion     Inversion    Eversion         PRE's     Toe curls into towel    Toe spreading    Dorsiflexion     Plantarflexion  Soleus pump   Cues to bend @ MTP   Inversion    Eversion    Heel walk    Toe walk    SLR    Calf Raises Focus on great toe ext   Heel tap    Step ups         Knee Extension     Hamstring Curls     Leg Press 3x10 /[id number]#    3x10 DL calf raises 35# Seat: 4  Foot: 7   Seated Soleus 3x10 25# HS curl machine        Balance:     Rocker Board     BOSU     SLS    Aeromat     Foam Roll     Plyoback ball toss x30 tandem, each side, red MB    Tandem Stance airex   Standing hip abd HEP, posture cues, R>L hip fatigue   Biodex          Bike     Alter G 10' walking self selected pace, 90% WBing    3x10 DL calf raise,55%  3x10 eccentric calf raise, 30%   XL shorts        Manual interventions     PROM 4 ways    Scar massage    Jt mobs: post talar, calc inv/ev, midfoot rotation, 1st ray PF    IASTM HG 8: sweeping  HG 9: brushing       Therapeutic Exercise and NMR EXR  [x] (89301) Provided verbal/tactile cueing for activities related to strengthening, flexibility, endurance, ROM for improvements in LE, proximal hip, and core control with self care, mobility, lifting, ambulation.  [] (11689) Provided verbal/tactile cueing for activities related to improving balance, coordination, kinesthetic sense, posture, motor skill, proprioception  to assist with LE, proximal hip, and core control in self care, mobility, lifting, ambulation and eccentric single leg control. NMR and Therapeutic Activities:    [] (23094 or 13081) Provided verbal/tactile cueing for activities related to improving balance, coordination, kinesthetic sense, posture, motor skill, proprioception and motor activation to allow for proper function of core, proximal hip and LE with self care and ADLs  [] (46925) Gait Re-education- Provided training and instruction to the patient for proper LE, core and proximal hip recruitment and positioning and eccentric body weight control with ambulation re-education including up and down stairs     Home Exercise Program:    Access Code: [email or web address]; pt logs in online- does not need print out for updates  URL: [employer]/  Updated: 12/29/2022  Prepared by: Priscilla Osullivan    Exercises  Standing Ankle Dorsiflexion Stretch on Chair - 2 x daily - 7 x weekly - 1 sets - 15 reps  Standing Gastroc Stretch on Step - 2 x daily - 7 x weekly - 1 sets - 5 reps - 30 second hold  Standing Soleus Stretch on Step - 2 x daily - 7 x weekly - 1 sets - 5 reps - 30 second hold  Seated Ankle Plantarflexion Dorsiflexion PROM - 2 x daily - 7 x weekly - 1 sets - 5 reps - 30 second hold  Long Sitting Ankle Plantar Flexion with Resistance - 2 x daily - 7 x weekly - 3 sets - 10 reps  Long Sitting Ankle Inversion with Resistance - 2 x daily - 7 x weekly - 3 sets - 10 reps  Long Sitting Ankle Eversion with Resistance - 2 x daily - 7 x weekly - 3 sets - 10 reps  Seated Heel Raise - 2 x daily - 7 x weekly - 3 sets - 10 reps  Seated Heel Toe Raises - 2 x daily - 7 x weekly - 3 sets - 10 reps  Standing Eccentric Heel Raise - 1 x daily - 7 x weekly - 3 sets - 5 reps  Standing Heel Raise - 2 x daily - 7 x weekly - 3 sets - 10 reps  Seated Toe Towel Scrunches - 2 x daily - 7 x weekly - 3 sets - 10 reps  Toe Spreading - 2 x daily - 7 x weekly - 3 sets - 10 reps  Tandem Stance - 2 x daily - 7 x weekly - 3 sets - 30 seconds hold  Side Step Down with Counter Support - 2 x daily - 7 x weekly - 3 sets - 10 reps  Calf Mobilization with Small Ball - 1 x daily - 7 x weekly - 1 sets - 1 reps - 3-5 min hold    [x] (72776) Reviewed/Progressed HEP activities related to strengthening, flexibility, endurance, ROM of core, proximal hip and LE for functional self-care, mobility, lifting and ambulation/stair navigation   [] (91790)Reviewed/Progressed HEP activities related to improving balance, coordination, kinesthetic sense, posture, motor skill, proprioception of core, proximal hip and LE for self care, mobility, lifting, and ambulation/stair navigation      Manual Treatments:    [x] (77647) Provided manual therapy to mobilize LE, proximal hip and/or LS spine soft tissue/joints for the purpose of modulating pain, promoting relaxation,  increasing ROM, reducing/eliminating soft tissue swelling/inflammation/restriction, improving soft tissue extensibility and allowing for proper ROM for normal function with self care, mobility, lifting and ambulation.      Modalities:    Charges:  Timed Code Treatment Minutes: 45   Total Treatment Minutes: 45   Time in: 4:15  Time out: 5:00    [] EVAL (LOW) 20830 (typically 20 minutes face-to-face)  [] EVAL (MOD) 13169 (typically 30 minutes face-to-face)  [] EVAL (HIGH) 46150 (typically 45 minutes face-to-face)  [] RE-EVAL     [x] UD(01707) x 2    [] IONTO  [] NMR (64178) x     [] VASO  [] Manual (08024) x      [] Other:  [x] TA x 1     [] Mech Traction (41325)  [] ES(attended) (28600)      [] ES (un) (20086): GOALS:   Patient stated goal: get back to golf    [] Progressing: [] Met: [] Not Met: [] Adjusted     Therapist goals for Patient:   Short Term Goals: To be achieved in: 4 weeks 12/30/22  1. Independent in HEP and progression per patient tolerance, in order to prevent re-injury. [] Progressing: [x] Met: [] Not Met: [] Adjusted   2. Patient will have a decrease in pain to facilitate improvement in movement, function, and ADLs as indicated by Functional Deficits. [] Progressing: [x] Met: [] Not Met: [] Adjusted   3. Patient will demonstrate reciprocal gait pattern without limp. [] Progressing: [x] Met: [] Not Met: [] Adjusted      Long Term Goals: To be achieved in: 12 weeks 2/24/23  1. Disability index score of 65+ on FOTO to assist with reaching prior level of function. [] Progressing: [] Met: [] Not Met: [] Adjusted  2. Patient will demonstrate increased AROM to WNL to allow for proper joint functioning as indicated by patients Functional Deficits. [] Progressing: [] Met: [] Not Met: [] Adjusted  3. Patient will demonstrate an increase in R ankle strength to 4+/5 or greater to allow for proper functional mobility as indicated by patients Functional Deficits. [] Progressing: [] Met: [] Not Met: [] Adjusted  4. Patient will return to ADLs, IADLs and functional activities without increased symptoms or restriction. [] Progressing: [] Met: [] Not Met: [] Adjusted  5. Patient will be able to negotiate stairs with reciprocal gait pattern without reports of TC joint tightness. [] Progressing: [] Met: [] Not Met: [] Adjusted       6. Patient will tolerate walking >1 mile to aid in return to golf and safe negotiation of the course. [] Progressing: [] Met: [] Not Met: [] Adjusted         Overall Progression Towards Functional goals/ Treatment Progress Update:  [] Patient is progressing as expected towards functional goals listed. [] Progression is slowed due to complexities/Impairments listed.   [] Progression has been slowed due to co-morbidities. [x] Plan just implemented, too soon to assess goals progression <30days   [] Goals require adjustment due to lack of progress  [] Patient is not progressing as expected and requires additional follow up with physician  [] Other    Prognosis for POC: [x] Good [] Fair  [] Poor      Patient requires continued skilled intervention: [x] Yes  [] No    Treatment/Activity Tolerance:  [x] Patient able to complete treatment  [] Patient limited by fatigue  [] Patient limited by pain     [] Patient limited by other medical complications  [] Other:     Assessment:  Pt tolerated increased WBing % on DL and eccentric SL calf raises, has good clearance of R heel from ground but did exhibit about 1/2 inch deficit compared to height of L heel. Pt states that he noted more arch/foot fatigue than calf fatigue with raises in Alter G. Initiated calf raises on leg press and soleus pumps on HS machine, tolerated both well and reported calf fatigue. Pt very challenged by tandem ployback toss, difficulty maintaining tandem position for entirety of reps, require use of UE for counterbalance and had to step out of tandem position several times to regain balance control. Pt reported feeling good at end of session. Pt requires PT follow up to address ROM, strength and functional mobility deficits. PLAN: See eval  [x] Continue per plan of care [] Alter current plan (see comments above)  [] Plan of care initiated [] Hold pending MD visit [] Discharge    Electronically signed by:  Devante Garcia, PT, DPT, OCS  Physical Therapist  [email or web address]  [email or web address]    Note: If patient does not return for scheduled/ recommended follow up visits, this note will serve as a discharge from care along with most recent update on progress.

## 2023-01-24 ENCOUNTER — HOSPITAL ENCOUNTER (OUTPATIENT)
Dept: PHYSICAL THERAPY | Age: 68
Setting detail: THERAPIES SERIES
Discharge: HOME | End: 2023-01-24
Payer: COMMERCIAL

## 2023-01-24 NOTE — FLOWSHEET NOTE
The [facility] and [address],  Sports Performance and Rehabilitation, [facility] 6199 [address]  793 [facility],5Th Floor   [place], [address]  Phone: [phone]  Fax: [phone]      Physical Therapy  Cancellation/No-show Note  Patient Name:  Janee Lazo  :  1955   Date:  2023  Cancelled visits to date: 3  No-shows to date: 1    For today's appointment patient:  [x]  Cancelled  []  Rescheduled appointment  []  No-show     Reason given by patient:  [x]  Patient ill  []  Conflicting appointment   []  No transportation    []  Conflict with work  []  No reason given  []  Other:     Comments:      Electronically signed by:  Mike Alexanrda PT,

## 2023-01-27 ENCOUNTER — HOSPITAL ENCOUNTER (OUTPATIENT)
Dept: PHYSICAL THERAPY | Age: 68
Setting detail: THERAPIES SERIES
Discharge: HOME | End: 2023-01-27
Payer: COMMERCIAL

## 2023-01-27 PROCEDURE — 97110 THERAPEUTIC EXERCISES: CPT | Performed by: PHYSICAL THERAPIST

## 2023-01-27 PROCEDURE — 97530 THERAPEUTIC ACTIVITIES: CPT | Performed by: PHYSICAL THERAPIST

## 2023-01-27 NOTE — FLOWSHEET NOTE
The [address],  Sports Performance and Rehabilitation, [facility] 6199 1246 [address]  [facility],5[address]   [place], [address]  Phone: [phone]  Fax: [phone]      Physical Therapy Daily Treatment Note  Date:  2023    Patient Name:  Gabriel Lacey    :  1955  MRN: [id number]  Restrictions/Precautions:    Medical/Treatment Diagnosis Information:  Achilles tendon tear, right, subsequent encounter [S86.011D]  Treatment Diagnosis: M25.671, R53.1, R26.2  S/p Achilles repair DOS   Insurance/Certification information:  PT Insurance Information: UMR/BMN/no Raheel valdez  Physician Information:  Raphael Stanley MD    Has the plan of care been signed (Y/N):        [x]  Yes  []  No     Date of Patient follow up with Physician: 23 with CNP      Is this a Progress Report:     []  Yes  [x]  No        If Yes:  Date Range for reporting period:  Beginning 22  Ending 23    Progress report will be due (10 Rx or 30 days whichever is less): 29      Recertification will be due (POC Duration  / 90 days whichever is less): 22     Visit # Insurance Allowable Auth Required   2022 MED NEC []  Yes [x]  No      OUTCOME MEASURE DATE SCORE   FOTO 22 43   FOTO 23 54        Latex Allergy:  [x]NO      []YES  Preferred Language for Healthcare:   [x]English       []other:      Pain level:  0/10     SUBJECTIVE:  16 weeks po. Pt states that Achilles continues to feel good, no new complaints or issues to report.     OBJECTIVE:           ROM PROM AROM Comments     Left Right Left Right     Dorsiflexion      6    Plantarflexion      50     Inversion      35     Eversion      5                                    Strength Left Right Comments   Dorsiflexion   3+/5     Plantarflexion   3+/5     Inversion   3+/5     Eversion   3+/5        Reflexes/Sensation:               []Dermatomes/Myotomes intact               []Reflexes equal and normal bilaterally              []Other:     Joint mobility:               []Normal                      [x]Hypo              []Hyper     Palpation: TTP over incision     Functional Mobility/Transfers: Independent; difficulty with some ADLs d/t joint hypomobility     Posture:  WNL     Bandages/Dressings/Incisions:   12/2/22 incision healing well. + bump distal to Achilles, pt states that CNP told him it would be come less prominent over time     RESTRICTIONS/PRECAUTIONS: ROM to tolerance    Exercises/Interventions:     Exercise/Equipment Resistance/Repetitions Other comments   Stretching     Gastroc stretch 3x30\" incline board    Soleus stretch 3x30\" incline board    Self TC mob on chair Perform before stretching   [place] board seated DF/PF, inv/ev, cw/ccw Cue for ankle motion not knee   Seated HR/TR    Seated inv/Ev                                  Isometrics     Dorsiflexion     Plantarflexion     Inversion    Eversion         PRE's     Toe curls into towel    Toe spreading    Dorsiflexion     Plantarflexion  Soleus pump   Cues to bend @ MTP   Inversion    Eversion    Heel walk    Toe walk    SLR    Calf Raises Focus on great toe ext   Heel tap    Step ups 3x10 4\" + airex         Knee Extension     Hamstring Curls     Leg Press 4x10 /115/125/125#    3x10 DL calf raises 35# Seat: 4  Foot: 7   Seated Soleus 3x12 25# HS curl machine        Balance:     Rocker Board     BOSU     SLS    Aeromat     Foam Roll     [place] ball toss x15 tandem, each side, red MB    Tandem Stance airex   Standing hip abd HEP, posture cues, R>L hip fatigue   Biodex          Bike     Alter G 10' walking self selected pace, 90% WBing    3x10 DL calf raise, 70%  3x10 eccentric calf raise, 35%   XL shorts        Manual interventions     PROM 4 ways    Scar massage    Jt mobs: post talar, calc inv/ev, midfoot rotation, 1st ray PF    IASTM HG 8: sweeping  HG 9: brushing       Therapeutic Exercise and NMR EXR  [x] (53475) Provided verbal/tactile cueing for activities related to strengthening, flexibility, endurance, ROM for improvements in LE, proximal hip, and core control with self care, mobility, lifting, ambulation.  [] (03054) Provided verbal/tactile cueing for activities related to improving balance, coordination, kinesthetic sense, posture, motor skill, proprioception  to assist with LE, proximal hip, and core control in self care, mobility, lifting, ambulation and eccentric single leg control. NMR and Therapeutic Activities:    [] (19107 or 20985) Provided verbal/tactile cueing for activities related to improving balance, coordination, kinesthetic sense, posture, motor skill, proprioception and motor activation to allow for proper function of core, proximal hip and LE with self care and ADLs  [] (00822) Gait Re-education- Provided training and instruction to the patient for proper LE, core and proximal hip recruitment and positioning and eccentric body weight control with ambulation re-education including up and down stairs     Home Exercise Program:    Access Code: [email or web address]; pt logs in online- does not need print out for updates  URL: [employer]/  Updated: 12/29/2022  Prepared by: Patrica Nuñez    Exercises  Standing Ankle Dorsiflexion Stretch on Chair - 2 x daily - 7 x weekly - 1 sets - 15 reps  Standing Gastroc Stretch on Step - 2 x daily - 7 x weekly - 1 sets - 5 reps - 30 second hold  Standing Soleus Stretch on Step - 2 x daily - 7 x weekly - 1 sets - 5 reps - 30 second hold  Seated Ankle Plantarflexion Dorsiflexion PROM - 2 x daily - 7 x weekly - 1 sets - 5 reps - 30 second hold  Long Sitting Ankle Plantar Flexion with Resistance - 2 x daily - 7 x weekly - 3 sets - 10 reps  Long Sitting Ankle Inversion with Resistance - 2 x daily - 7 x weekly - 3 sets - 10 reps  Long Sitting Ankle Eversion with Resistance - 2 x daily - 7 x weekly - 3 sets - 10 reps  Seated Heel Raise - 2 x daily - 7 x weekly - 3 sets - 10 reps  Seated Heel Toe Raises - 2 x daily - 7 x weekly - 3 sets - 10 reps  Standing Eccentric Heel Raise - 1 x daily - 7 x weekly - 3 sets - 5 reps  Standing Heel Raise - 2 x daily - 7 x weekly - 3 sets - 10 reps  Seated Toe Towel Scrunches - 2 x daily - 7 x weekly - 3 sets - 10 reps  Toe Spreading - 2 x daily - 7 x weekly - 3 sets - 10 reps  Tandem Stance - 2 x daily - 7 x weekly - 3 sets - 30 seconds hold  Side Step Down with Counter Support - 2 x daily - 7 x weekly - 3 sets - 10 reps  Calf Mobilization with Small Ball - 1 x daily - 7 x weekly - 1 sets - 1 reps - 3-5 min hold    [x] (50443) Reviewed/Progressed HEP activities related to strengthening, flexibility, endurance, ROM of core, proximal hip and LE for functional self-care, mobility, lifting and ambulation/stair navigation   [] (14953)Reviewed/Progressed HEP activities related to improving balance, coordination, kinesthetic sense, posture, motor skill, proprioception of core, proximal hip and LE for self care, mobility, lifting, and ambulation/stair navigation      Manual Treatments:    [x] (54731) Provided manual therapy to mobilize LE, proximal hip and/or LS spine soft tissue/joints for the purpose of modulating pain, promoting relaxation,  increasing ROM, reducing/eliminating soft tissue swelling/inflammation/restriction, improving soft tissue extensibility and allowing for proper ROM for normal function with self care, mobility, lifting and ambulation.      Modalities:    Charges:  Timed Code Treatment Minutes: 38   Total Treatment Minutes: 38   Time in: 4:00  Time out: 4:56    [] EVAL (LOW) 14134 (typically 20 minutes face-to-face)  [] EVAL (MOD) 03417 (typically 30 minutes face-to-face)  [] EVAL (HIGH) 50386 (typically 45 minutes face-to-face)  [] RE-EVAL     [x] KE(78116) x 2    [] IONTO  [] NMR (71159) x     [] VASO  [] Manual (71496) x      [] Other:  [x] TA x 1     [] Mech Traction (89254)  [] ES(attended) (69533)      [] ES (un) (35009):     GOALS:   Patient stated goal: get back to golf    [] Progressing: [] Met: [] Not Met: [] Adjusted     Therapist goals for Patient:   Short Term Goals: To be achieved in: 4 weeks 12/30/22  1. Independent in HEP and progression per patient tolerance, in order to prevent re-injury. [] Progressing: [x] Met: [] Not Met: [] Adjusted   2. Patient will have a decrease in pain to facilitate improvement in movement, function, and ADLs as indicated by Functional Deficits. [] Progressing: [x] Met: [] Not Met: [] Adjusted   3. Patient will demonstrate reciprocal gait pattern without limp. [] Progressing: [x] Met: [] Not Met: [] Adjusted      Long Term Goals: To be achieved in: 12 weeks 2/24/23  1. Disability index score of 65+ on FOTO to assist with reaching prior level of function. [] Progressing: [] Met: [] Not Met: [] Adjusted  2. Patient will demonstrate increased AROM to WNL to allow for proper joint functioning as indicated by patients Functional Deficits. [] Progressing: [] Met: [] Not Met: [] Adjusted  3. Patient will demonstrate an increase in R ankle strength to 4+/5 or greater to allow for proper functional mobility as indicated by patients Functional Deficits. [] Progressing: [] Met: [] Not Met: [] Adjusted  4. Patient will return to ADLs, IADLs and functional activities without increased symptoms or restriction. [] Progressing: [] Met: [] Not Met: [] Adjusted  5. Patient will be able to negotiate stairs with reciprocal gait pattern without reports of TC joint tightness. [] Progressing: [] Met: [] Not Met: [] Adjusted       6. Patient will tolerate walking >1 mile to aid in return to golf and safe negotiation of the course. [] Progressing: [] Met: [] Not Met: [] Adjusted         Overall Progression Towards Functional goals/ Treatment Progress Update:  [] Patient is progressing as expected towards functional goals listed. [] Progression is slowed due to complexities/Impairments listed.   [] Progression has been slowed due to co-morbidities. [x] Plan just implemented, too soon to assess goals progression <30days   [] Goals require adjustment due to lack of progress  [] Patient is not progressing as expected and requires additional follow up with physician  [] Other    Prognosis for POC: [x] Good [] Fair  [] Poor      Patient requires continued skilled intervention: [x] Yes  [] No    Treatment/Activity Tolerance:  [x] Patient able to complete treatment  [] Patient limited by fatigue  [] Patient limited by pain     [] Patient limited by other medical complications  [] Other:     Assessment:  Pt tolerated increased WBing % on DL and eccentric SL calf raises, reported that it was more challenging but felt good, did have less heel clearance compared to L side. Pt continues to be fatigued with calf raises on LP and seated soleus raises. Pt tolerated step up with airex well, increased use of ankle strategy of maintain control when stepping up. Pt has improved balance in tandem stance with ball toss compared to LPV, though he still exhibits instability and falls out of position every few tosses. Pt reports being fatigued at end of the session. Pt requires PT follow up to address ROM, strength and functional mobility deficits. PLAN: See eval  [x] Continue per plan of care [] Alter current plan (see comments above)  [] Plan of care initiated [] Hold pending MD visit [] Discharge    Electronically signed by:  Tita Seay, PT, DPT, OCS  Physical Therapist  [phone]  [email or web address]    Note: If patient does not return for scheduled/ recommended follow up visits, this note will serve as a discharge from care along with most recent update on progress.

## 2023-02-02 NOTE — PROGRESS NOTES
[address]   Cardiac Electrophysiology Consultation   Date: 2/14/2023  Reason for Consultation: AF  Consult Requesting Physician: No att. providers found     Chief Complaint:   Chief Complaint   Patient presents with    New Patient     For AFIB       HPI: Trinity Bhakta is a 79 y.o. male referred by Dr. Eugenio Driver for AF. PMH significant for BPH and chronic AF/AFL (2007, in [place]), failed propafenone, on Xarelto. Pt had been following Dr. Aldair Grewal [facility]), but has transferred cardiac care to [address] [place] [address]. Echo (12/2022) showed EF 50-55% with LAE (EF had been 60% in 2020). Hulon Grates (12/2022) concerning for ischemia (previous stress test in 2020 was normal), LHC discussed, but pt is still considering. He sees Dr. Eugenio Driver on 2/17/23 to discuss [facility]. Pt reported negative sleep study several years ago. Interval History: Today, he is here to establish care with EP, presenting in AF/AFL CVR. This past year he reports TO with activities and decline in functional capacity. He can tell his activities have decreased over the past year or so due to getting fatigued. Denies complaints of palpitations, dizziness, CP, orthopnea, or LE edema. Denies smoking and caffeine, admits to social drinking. He reports sleeping well, denies snoring. Denies family h/o AF or CVA. He is planning for cataract surgeries in March and April. He is the  at [employer], [employer]. 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 Eugenio Driver on 2/17/2023  BPH, on Flomax    Plan:  Due to longstanding persistent, symptomatic AF along with LAE and mild CMP, would recommend rhythm management with combination of AF ablation and initiation of AAD. After his LHC, he will need to be on [address] for at least 4 weeks of uninterrupted [address] prior to ablation. If LHC is normal, will start flecainide 100 mg BID. If he has CAD, will consider admitting for dofetilide loading.   Follow up in 1 week with EP NP after the ablation  Follow up in 6-8 weeks with me    Discussed in detail about the risk factors, pathophysiology, and treatment options including life stye modifications for atrial fibrillation. Keep your blood pressure under control. Keep your blood sugar under control. Eat heart healthy diet  Minimize alcohol intake  Stop smoking  Be active, keep your body weight optimal  Exercise on a regular basis  Manage your stress   If you snore, get evaluated for sleep apnea  Be aware of the symptoms of stroke    We educated the patient that atrial fibrillation and atrial flutter are both progressive diseases, with more frequent episodes that will ensue. Subsequent episodes usually become more sustained to the extent that many individuals would then develop persistent atrial fibrillation/atrial flutter. Once persistence is reached, permanent atrial dysrhythmia is inevitable. Treatment options including cardioversion, anti-arrhythmic medication therapy, rate control strategy, oral anticoagulation, and atrial fibrillation ablation were discussed with patient. Risks, benefits and alternative of each treatment options were explained. We discussed different management options for both atrial tachydysrhythmia including their risks and benefits. These options include use of cardioversion (mainly for persisting atrial fibrillation or atrial flutter) which provides an effective immediate therapy with success rates of 75% or higher, but it provides no short nor long term efficacy.   Anti-arrhythmic medications provide a very effective short term therapy, but even with our most potent anti-arrhythmic medication there is limited long term efficacy (clinical studies have shown that 40% of patients remain atrial fibrillation-free after 4 years of follow-up after starting one of the more powerful anti-arrhythmic medication (amiodarone), and, if extrapolated, may have further diminishing success as time goes on).  Against atrial flutter, any anti-arrhythmic medication would be nearly ineffective. Atrial fibrillation and atrial flutter ablation is a potentially curative therapy with very reasonable success rate after a first time procedure and with improving success rates with subsequent procedures. The risks, benefits and alternatives of the ablation procedure were discussed with the patient. The risks including, but not limited to, the risks of bleeding, infection, radiation exposure, injury to vascular, cardiac and surrounding structures (including pneumothorax), stroke, cardiac perforation, tamponade, need for emergent open heart surgery, need for pacemaker implantation, injury to the phrenic nerve, injury to the esophagus, myocardial infarction and death were discussed in detail. The patient wishes to proceed. We will also schedule the atrial fibrillation and atrial flutter ablation with general anaesthesia. Will schedule for RF ablation with [employer] system. He would need Cardiac CTA, 3 days prior to procedure for pulmonary vein mapping. Will order BMP, CBC, PT/INR, and Type & Screen prior to the procedure. Atrial Fibrillation:    BMI    :   Body mass index is 38.08 kg/m².     Duration   :   Longstanding, 2006    Symptoms   :   Easy fatigability, dyspnea on exertion, decline in his functional capacity    Previous DCCV :  none    Previous AAD             :   propafenone 1/2020 - 12/2022    Beta blocker  :   Toprol    ACE / ARB  :   none    OAC   :   Xarelto    LVEF    :   50-55%    Left atrial size  :   4.6 cm    JOSEP   :   negative    Past Medical History:   Diagnosis Date    Atrial fibrillation (HCC)     BPH (benign prostatic hyperplasia)     Urge incontinence         Past Surgical History:   Procedure Laterality Date    ACHILLES TENDON SURGERY Left     ACHILLES TENDON SURGERY Right 9/29/2022    RIGHT ACHILLES SECONDAY RECONSTRUCTION WITH CALCANEUS EXCISION performed by Hari Strauss Silvana Xavier MD at [address] [facility]      2 2 level fusions    REFRACTIVE SURGERY Bilateral        Allergies:  No Known Allergies    Medication:   Prior to Admission medications    Medication Sig Start Date End Date Taking? Authorizing Provider   aspirin EC 81 MG EC tablet Take 1 tablet by mouth daily 1/20/23  Yes Sameer Hall MD   rivaroxaban (XARELTO) 20 MG TABS tablet Take 1 tablet by mouth Daily with supper 1/20/23  Yes Sameer Hall MD   metoprolol succinate (TOPROL XL) 100 MG extended release tablet TAKE 1 TABLET BY MOUTH TWICE A DAY 12/27/22  Yes Sameer Hall MD   oxybutynin (DITROPAN-XL) 5 MG extended release tablet Take 1 tablet by mouth daily 12/7/22  Yes Kendra Osullivan MD   tamsulosin (FLOMAX) 0.4 MG capsule Take 1 capsule by mouth daily 12/7/22  Yes Kendra Osullivan MD       Social History:   reports that he has never smoked. He has never used smokeless tobacco. He reports current alcohol use. He reports that he does not use drugs. Family History:  family history includes No Known Problems in his father and mother. Reviewed. Denies family history of sudden cardiac death, arrhythmia, premature CAD    Review of System:    General ROS: negative for - chills, fever   Psychological ROS: negative for - anxiety or depression  Ophthalmic ROS: negative for - eye pain or loss of vision  ENT ROS: negative for - epistaxis, headaches, nasal discharge, sore throat   Allergy and Immunology ROS: negative for - hives, nasal congestion   Hematological and Lymphatic ROS: negative for - bleeding problems, blood clots, bruising or jaundice  Endocrine ROS: negative for - skin changes, temperature intolerance or unexpected weight changes  Respiratory ROS: negative for - cough, hemoptysis, pleuritic pain, SOB, sputum changes or wheezing  Cardiovascular ROS: Per HPI.    Gastrointestinal ROS: negative for - abdominal pain, blood in stools, diarrhea, hematemesis, melena, nausea/vomiting or swallowing difficulty/pain  Genito-Urinary ROS: negative for - dysuria or incontinence  Musculoskeletal ROS: negative for - joint swelling or muscle pain  Neurological ROS: negative for - confusion, dizziness, gait disturbance, headaches, numbness/tingling, seizures, speech problems, tremors, visual changes or weakness  Dermatological ROS: negative for - rash    Physical Examination:  Vitals:    02/14/23 1315   BP: 110/80   Pulse: 71       Constitutional: Oriented. No distress. Head: Normocephalic and atraumatic. Mouth/Throat: Oropharynx is clear and moist.   Eyes: Conjunctivae normal. EOM are normal.   Neck: Normal range of motion. Neck supple. No rigidity. No JVD present. Cardiovascular: Normal rate, regular rhythm, S1&S2 and intact distal pulses. Pulmonary/Chest: Bilateral respiratory sounds. No wheezes. No rhonchi. Abdominal: Soft. Bowel sounds present. No distension, No tenderness. Musculoskeletal: No tenderness. No edema    Lymphadenopathy: Has no cervical adenopathy. Neurological: Alert and oriented. Cranial nerve appears intact, No Gross deficit   Skin: Skin is warm and dry. No rash noted. Psychiatric: Has a normal mood, affect and behavior     Labs:  Reviewed. ECG: reviewed, AF 86    Studies:   1. Echo 12/14/22:   Summary   Left ventricular cavity size is normal.   Normal left ventricular wall thickness. Left ventricular function is normal with ejection fraction estimated at   50-55%. No regional wall motion abnormalities are noted. Diastolic function cannot be assessed due to an arrhythmia. Mild to moderate mitral regurgitation is present. Bi-atrial enlargement. Mild tricuspid regurgitation. Mild pulmonic regurgitation present. Estimated pulmonary artery systolic pressure is at 27 mmHg assuming a right   atrial pressure of 3 mmHg. Echo 1/6/20: EF 60%    2.  Stress Test 12/13/22:     Summary    There is a small-sized, moderate intensity, reversible defect of the    inferior wall that is consistent with ischemia. The LVEF is normal and the LV wall motion is normal.        This is an intermediate risk cardiac scan. The MCOT, echocardiogram, stress test, and coronary angiography/PCI were reviewed by myself and used for my plan of care. - The patient is counseled to follow a low salt diet to assure blood pressure remains controlled for cardiovascular risk factor modification.   - The patient is counseled to avoid excess caffeine, and energy drinks as this may exacerbated ectopy and arrhythmia. - The patient is counseled to get regular exercise 3-5 times per week to control cardiovascular risk factors. - The patient is counseled to lose weigt to control cardiovascular risk factors. -The patient is counseled about the health hazards of smoking including cardiovascular side effects and its impact on morbidity and mortality. Thank you for allowing me to participate in the care of Fatimah Velez. All questions and concerns were addressed to the patient/family. Alternatives to my treatment were discussed. Fatimah Carrasco RN, am scribing for and in the presence of Dr. Yuriy Thomson. 02/14/23 1:34 PM  Milena Prescott RN    I, Tanya Macias MD, personally performed the services prescribed in this documentation as scribed by Ms. Milena Prescott RN in my presence and it is both accurate and complete.        Tanya Macias MD  Cardiac Electrophysiology  [facility]

## 2023-02-14 ENCOUNTER — OFFICE VISIT (OUTPATIENT)
Dept: CARDIOLOGY CLINIC | Age: 68
End: 2023-02-14
Payer: COMMERCIAL

## 2023-02-14 VITALS
BODY MASS INDEX: 38.08 KG/M2 | WEIGHT: 273 LBS | HEART RATE: 71 BPM | DIASTOLIC BLOOD PRESSURE: 80 MMHG | SYSTOLIC BLOOD PRESSURE: 110 MMHG

## 2023-02-14 DIAGNOSIS — I48.21 PERMANENT ATRIAL FIBRILLATION (HCC): Primary | ICD-10-CM

## 2023-02-14 PROCEDURE — 99205 OFFICE O/P NEW HI 60 MIN: CPT | Performed by: INTERNAL MEDICINE

## 2023-02-14 PROCEDURE — 93000 ELECTROCARDIOGRAM COMPLETE: CPT | Performed by: INTERNAL MEDICINE

## 2023-02-14 PROCEDURE — 1123F ACP DISCUSS/DSCN MKR DOCD: CPT | Performed by: INTERNAL MEDICINE

## 2023-02-14 NOTE — LETTER
[facility]  EP Procedure Sheet  2/14/23           [id number]  Celina Mortona  1955    Physician: Dr. Ahmet Pitts    EP Procedures   Pacemaker implant x EP Study    ICD implant x Atrial flutter ablation     Biv implant x Atrial fibrillation ablation    Generator Change  SVT ablation    Lead revision  VT ablation    Lead extraction +/- upgrade  AVN ablation    Loop implant  Cardioversion     Other:   KEON     Equipment   Medtronic   CARMINE Mapping System    [place] x [address] [address]  CryoAblation    Biotronik  Laser Lead Extraction    Special Equipment       EP Procedures Scheduling Request  Time Requested  0800   Specific Day 4/12/23   Anesthesia xxx   CT surgery backup    Location [facility]     Pre-Procedure Labs / Imaging   PT/INR  Type & cross    CBC  Units PRBC    BMP/Mg  Units FFP    Venogram  CXR    Echo  Pulmonary CTA for Pulmonary vein mapping     CTA on 4/7 in the morning

## 2023-02-14 NOTE — PATIENT INSTRUCTIONS
Electrophysiology Study and Atrial Fibrillation (AFib) Ablation    Date of Procedure: April 12, 2023    Time of Arrival: 6:00 am    Cardiologist performing procedure: Dr. Bi Allison at [facility] through the main entrance. Check in at the Outpatient Diagnostic desk on the 1st floor. Do not eat or drink anything after midnight the night before the procedure. You may brush your teeth and rinse the morning of the procedure. Do NOT stop taking Xarelto (any blood thinners) leading up to the ablation. However, do not take blood thinners the morning of the procedure. Hold flecainide for 2 days prior to the procedure (if we start after angiogram). Take all of your other routine medications the morning of the procedure. However, if you are taking diabetic medications, please HOLD on the day of the procedure (including insulin). If you take Lantus insulin, take HALF of your usual dose the night before. Do not apply any lotion, powder, or deodorant the morning of the procedure. Please bring a list of your medications to the hospital with you. CT scan of the chest is due within a week of the procedure (4/7/23). Someone will call with date and time. Lab work is due within a week of the procedure (4/7/23). You do not need to be fasting for these labs. This can be done at the [address] [place] [address] lab at [address]. You must have someone available to drive you home the same day. It is recommended that you do not drive for 48 hours after the procedure. You will also need someone with you at home the night of the procedure. If you are unable to make this appointment, please call [facility], at [phone].

## 2023-02-16 ASSESSMENT — ENCOUNTER SYMPTOMS
ABDOMINAL PAIN: 0
VOMITING: 0

## 2023-02-16 NOTE — PATIENT INSTRUCTIONS
Left Heart Catheterization    A left heart catheterization is a procedure that provides your cardiologist with detailed information regarding how your heart functions. A small catheter (long, fine tube) is inserted into an artery (a vessel that carries blood and oxygen) that leads to your heart. While watching with x-ray equipment, small amounts of dye are injected which enables visualization of the heart arteries and chambers. The pictures that your cardiologist receives from the cardiac catheterization enable him or her to decide on the best treatment for you. Date of the procedure:       Time of arrival:      Cardiologist performing the procedure:  Osei    Instructions for your left heart catheterization:    1. Bring a list of your medications to the hospital.    2.  Please notify us before the procedure if you are allergic to anything; especially x-ray contrast dye, iodine, nickel, or any type of jewelry. This is very important! 3. Do not eat or drink anything at all after midnight (or 8 hours) prior to the procedure. 4.  If you are on Coumadin, Warfarin, or [place] Nishant, please notify us so that we can make adjustments to your medication. 5.  If you are taking Xarelto, Eliquis, or Pradaxa, please stop staking these medications two days prior to the procedure (including the day of the procedure). 6.  Take all other morning medications EXCEPT any diuretics (water pills) the day of the procedure with a small sip of water. 7.  If you are diabetic, check your blood sugar in the morning. If your blood sugar is 120 or less, do not take insulin. If your blood sugar is more than 120, take half the dose of your normal insulin. Do not take Metformin the night before your procedure or morning of the procedure. 8.  You MUST have someone to drive you home--no driving for 24 hours after your procedure.   If an intervention is performed, you might stay overnight in the hospital.    9.  Discharge instructions will be given to you at the time of your procedure. 10.  For any questions or if you cannot keep this appointment for any reason, please call [phone].

## 2023-02-16 NOTE — PROGRESS NOTES
[facility]     Outpatient Cardiology         Patient Name:  Fernanda Vail  Requesting Physician: Bob Dejesus MD   Primary Care Physician: Bob Dejesus MD    Reason for Consultation/Chief Complaint:   Chief Complaint   Patient presents with    Atrial Fibrillation         HPI:     77 y.o. male with history of Afib, presents for follow up. Presents for follow-up of abnormal stress test.  Previously reported exertional shortness of breath. Currently denies chest pain, sob, orthopnea, pnd, edema, and palpitations. PMH of afib for the past 15 years, previously seen by Dr. Sincere Ortiz, practice closed. Previously had chronic afib on Rythmol. Had appointment with Dr Daly Cordon on 2/14/23 for EP to manage Afib, has ablation scheduled. Histories:     Past Medical History:   has a past medical history of Atrial fibrillation ([address] BPH (benign prostatic hyperplasia), and Urge incontinence. Surgical History:   has a past surgical history that includes Cervical spine surgery; Achilles tendon surgery (Left); Refractive surgery (Bilateral); and Achilles tendon surgery (Right, 9/29/2022). Social History:   reports that he has never smoked. He has never used smokeless tobacco. He reports current alcohol use. He reports that he does not use drugs. Family History:  No evidence for sudden cardiac death or premature CAD    Medications:     Home Medications:  Were reviewed and are listed in nursing record. and/or listed below    Prior to Admission medications    Medication Sig Start Date End Date Taking?  Authorizing Provider   aspirin EC 81 MG EC tablet Take 1 tablet by mouth daily 1/20/23  Yes Hyacinth Winslow MD   rivaroxaban (XARELTO) 20 MG TABS tablet Take 1 tablet by mouth Daily with supper 1/20/23  Yes Hyacinth Winslow MD   metoprolol succinate (TOPROL XL) 100 MG extended release tablet TAKE 1 TABLET BY MOUTH TWICE A DAY 12/27/22  Yes Hyacinth Winslow MD   oxybutynin (DITROPAN-XL) 5 MG extended release tablet Take 1 tablet by mouth daily 12/7/22  Yes Bossman Bruce MD   tamsulosin (FLOMAX) 0.4 MG capsule Take 1 capsule by mouth daily 12/7/22  Yes Bossman Bruce MD        Allergy:     Patient has no known allergies. Review of Systems:     Review of Systems   Constitutional:  Negative for chills and fever. Respiratory:          See HPI   Cardiovascular:         See HPI. Gastrointestinal:  Negative for abdominal pain and vomiting. Endocrine: Negative. Genitourinary: Negative. Skin: Negative. Psychiatric/Behavioral: Negative. All other systems reviewed and are negative. Physical Examination:     Vitals:    02/17/23 0759   BP: 110/80   Site: Left Upper Arm   Position: Sitting   Cuff Size: Medium Adult   Pulse: 94   SpO2: 95%   Weight: 269 lb 3.2 oz (122.1 kg)         Wt Readings from Last 3 Encounters:   02/17/23 269 lb 3.2 oz (122.1 kg)   02/14/23 273 lb (123.8 kg)   01/20/23 268 lb (121.6 kg)       Physical Exam  Constitutional:       Appearance: Normal appearance. HENT:      Head: Normocephalic and atraumatic. Nose: Nose normal.   Eyes:      Conjunctiva/sclera: Conjunctivae normal.   Cardiovascular:      Rate and Rhythm: Normal rate. Rhythm irregular. Heart sounds: Normal heart sounds. Pulmonary:      Effort: Pulmonary effort is normal.      Breath sounds: Normal breath sounds. Abdominal:      Palpations: Abdomen is soft. Musculoskeletal:      Cervical back: Neck supple. Skin:     General: Skin is warm and dry. Neurological:      General: No focal deficit present. Mental Status: He is alert.          Labs:     Lab Results   Component Value Date    WBC 6.4 05/12/2022    HGB 13.6 05/12/2022    HCT 40.1 (L) 05/12/2022    MCV 91.7 05/12/2022     05/12/2022     Lab Results   Component Value Date     05/12/2022    K 4.4 05/12/2022     05/12/2022    CO2 23 05/12/2022    BUN 16 05/12/2022    CREATININE 0.9 05/12/2022    GLUCOSE 91 05/12/2022    CALCIUM 9.1 05/12/2022    PROT 7.1 05/12/2022    LABALBU 4.2 05/12/2022    BILITOT 0.6 05/12/2022    ALKPHOS 52 05/12/2022    AST 14 (L) 05/12/2022    ALT 11 05/12/2022    LABGLOM >60 05/12/2022    GFRAA >60 05/12/2022    AGRATIO 1.4 05/12/2022         No results found for: CHOL  No results found for: TRIG  Lab Results   Component Value Date    HDL 41 05/12/2022     Lab Results   Component Value Date    LDLCALC 98 05/12/2022     Lab Results   Component Value Date    LABVLDL 16 05/12/2022     No results found for: CHOLHDLRATIO    No results found for: INR, PROTIME    The 10-year ASCVD risk score (Noemí DK, et al., 2019) is: 10.9%    Values used to calculate the score:      Age: 79 years      Sex: Male      Is Non- : No      Diabetic: No      Tobacco smoker: No      Systolic Blood Pressure: 371 mmHg      Is BP treated: No      HDL Cholesterol: 41 mg/dL      Total Cholesterol: 155 mg/dL      Imaging:       ECG (if available, Personally interpreted):    Last Monitor/Holter (if available):    Last Stress (if available): 12/13/22   Summary   There is a small-sized, moderate intensity, reversible defect of the   inferior wall that is consistent with ischemia. The LVEF is normal and the LV wall motion is normal.    1/6/20  Final Conclusions/Summary     Stress ECG Impressions:  Normal exercise stress test.     Summary:  Normal coronary perfusion. Preserved biventricular global and regional systolic function, with an estimated LVEF of approx 60%. Baseline hypertension. Last Cath (if available):    Last TTE/KEON(if available): 12/14/22   Summary   Left ventricular cavity size is normal.   Normal left ventricular wall thickness. Left ventricular function is normal with ejection fraction estimated at   50-55%. No regional wall motion abnormalities are noted. Diastolic function cannot be assessed due to an arrhythmia.    Mild to moderate mitral regurgitation is present. Bi-atrial enlargement. Mild tricuspid regurgitation. Mild pulmonic regurgitation present. Estimated pulmonary artery systolic pressure is at 27 mmHg assuming a right   atrial pressure of 3 mmHg. 1/6/20  Findings:     ~Left Ventricle: Normal left ventricle size. Borderline left ventricular hypertrophy. Normal left ventricular systolic function. The ejection fraction is visually estimated to be 60 %. Normal regional wall motion. ~Right Ventricle: Normal in size and function. ~Left Atrium: Mild to moderate enlargement of the left atrium. Atrial septal thickening is noted. ~Right Atrium: Mild enlargement of the right atrium. ~Mitral Valve: Normal mitral valve structure and mobility. Mild mitral valve regurgitation. ~Aortic Valve: Tri-leaflet aortic valve. Normal aortic valve structure and mobility. ~Tricuspid Valve: Normal tricuspid valve structure and mobility. Mild to moderate tricuspid valve regurgitation. The pulmonary artery pressure is normal.     ~Pulmonic Valve: Pulmonary valve not well visualized. Mild pulmonic regurgitation. ~Great Vessels: Normal size aorta. Normal IVC size and normal respiratory collapse consistent with normal right atrial pressure. ~Pericardium: No pericardial effusion. Last CMR  (if available):    Last Coronary Artery Calcium Score: Ankle-brachial index:    Carotid ultrasound screening:    Abdominal aortic aneurysm screening:    Assessment / Plan:     1. Abnormal cardiovascular stress test    2. Permanent atrial fibrillation ([address]    3. Dyspnea, unspecified type    4.  Anticoagulated      Continue aspirin and beta-blocker  LHC, possible PCI  Hold anticoagulation for 2 days prior to procedure  Risks, benefits and alternatives to cardiac cath and possible intervention were discussed with the patient including bleeding, heart attack, stroke, kidney failure and limb loss  RTC after LHC    No orders of the defined types were placed in this encounter. No follow-ups on file. The note was completed using EMR and Dragon dictation system. Every effort was made to ensure accuracy; however, inadvertent computerized transcription errors may be present. All questions and concerns were addressed to the patient. I would like to thank you for providing me the opportunity to participate in the care of your patient. If you have any questions, please do not hesitate to contact me. Beatriz Waldrop MD, [facility]  The [address]  [address] [place] [address]  Main Office Phone: [phone]  Fax: [phone]    I, Caridad Curry RN, am scribing for and in the presence of Dr. Beatriz Waldrop. 02/17/23 8:54 AM  Caridad Curry RN    Physician Attestation:  The scribes documentation has been prepared under my direction and personally reviewed by me in its entirety. I confirm the note above accurately reflects all work, treatment, procedures, and medical decision making performed by me.     Electronically signed by Beatriz Waldrop MD on 2/17/2023 at 8:54 AM

## 2023-02-17 ENCOUNTER — TELEPHONE (OUTPATIENT)
Dept: CARDIOLOGY CLINIC | Age: 68
End: 2023-02-17

## 2023-02-17 ENCOUNTER — OFFICE VISIT (OUTPATIENT)
Dept: CARDIOLOGY CLINIC | Age: 68
End: 2023-02-17
Payer: COMMERCIAL

## 2023-02-17 VITALS
HEART RATE: 94 BPM | OXYGEN SATURATION: 95 % | BODY MASS INDEX: 37.55 KG/M2 | DIASTOLIC BLOOD PRESSURE: 80 MMHG | SYSTOLIC BLOOD PRESSURE: 110 MMHG | WEIGHT: 269.2 LBS

## 2023-02-17 DIAGNOSIS — Z79.01 ANTICOAGULATED: ICD-10-CM

## 2023-02-17 DIAGNOSIS — R94.39 ABNORMAL CARDIOVASCULAR STRESS TEST: Primary | ICD-10-CM

## 2023-02-17 DIAGNOSIS — I48.21 PERMANENT ATRIAL FIBRILLATION (HCC): ICD-10-CM

## 2023-02-17 DIAGNOSIS — R06.00 DYSPNEA, UNSPECIFIED TYPE: ICD-10-CM

## 2023-02-17 PROCEDURE — 1123F ACP DISCUSS/DSCN MKR DOCD: CPT | Performed by: INTERNAL MEDICINE

## 2023-02-17 PROCEDURE — 99214 OFFICE O/P EST MOD 30 MIN: CPT | Performed by: INTERNAL MEDICINE

## 2023-02-17 NOTE — TELEPHONE ENCOUNTER
Spoke with the patient and got him scheduled for his procedure. We went over the instructions below and he verbalized understanding. Left Heart Catheterization     A left heart catheterization is a procedure that provides your cardiologist with detailed information regarding how your heart functions. A small catheter (long, fine tube) is inserted into an artery (a vessel that carries blood and oxygen) that leads to your heart. While watching with x-ray equipment, small amounts of dye are injected which enables visualization of the heart arteries and chambers. The pictures that your cardiologist receives from the cardiac catheterization enable him or her to decide on the best treatment for you. Date of the procedure:   3/1/2023     Time of arrival:  7:30 am   Procedure time: 9:00 am        Cardiologist performing the procedure:  Osei     Instructions for your left heart catheterization:     1. Bring a list of your medications to the hospital.     2.  Please notify us before the procedure if you are allergic to anything; especially x-ray contrast dye, iodine, nickel, or any type of jewelry. This is very important! 3. Do not eat or drink anything at all after midnight (or 8 hours) prior to the procedure. 4.  If you are on Coumadin, Warfarin, or Ashley Angles, please notify us so that we can make adjustments to your medication. 5.  If you are taking Xarelto, Eliquis, or Pradaxa, please stop staking these medications two days prior to the procedure (including the day of the procedure). 6.  Take all other morning medications EXCEPT any diuretics (water pills) the day of the procedure with a small sip of water. 7.  If you are diabetic, check your blood sugar in the morning. If your blood sugar is 120 or less, do not take insulin. If your blood sugar is more than 120, take half the dose of your normal insulin.   Do not take Metformin the night before your procedure or morning of the procedure. 8.  You MUST have someone to drive you home--no driving for 24 hours after your procedure. If an intervention is performed, you might stay overnight in the hospital.     9.  Discharge instructions will be given to you at the time of your procedure. 10.  For any questions or if you cannot keep this appointment for any reason, please call [phone].     Qgenda update / alerted cath lab [facility])

## 2023-02-17 NOTE — TELEPHONE ENCOUNTER
Please call and schedule [facility]. Reviewed instructions with patient in clinic. Instructed to hold Xarelto for 2 days prior to procedure. Left Heart Catheterization    A left heart catheterization is a procedure that provides your cardiologist with detailed information regarding how your heart functions. A small catheter (long, fine tube) is inserted into an artery (a vessel that carries blood and oxygen) that leads to your heart. While watching with x-ray equipment, small amounts of dye are injected which enables visualization of the heart arteries and chambers. The pictures that your cardiologist receives from the cardiac catheterization enable him or her to decide on the best treatment for you. Date of the procedure:       Time of arrival:      Cardiologist performing the procedure:  Osei    Instructions for your left heart catheterization:    1. Bring a list of your medications to the hospital.    2.  Please notify us before the procedure if you are allergic to anything; especially x-ray contrast dye, iodine, nickel, or any type of jewelry. This is very important! 3. Do not eat or drink anything at all after midnight (or 8 hours) prior to the procedure. 4.  If you are on Coumadin, Warfarin, or Radha Hug, please notify us so that we can make adjustments to your medication. 5.  If you are taking Xarelto, Eliquis, or Pradaxa, please stop staking these medications two days prior to the procedure (including the day of the procedure). 6.  Take all other morning medications EXCEPT any diuretics (water pills) the day of the procedure with a small sip of water. 7.  If you are diabetic, check your blood sugar in the morning. If your blood sugar is 120 or less, do not take insulin. If your blood sugar is more than 120, take half the dose of your normal insulin. Do not take Metformin the night before your procedure or morning of the procedure.     8.  You MUST have someone to drive you home--no driving for 24 hours after your procedure. If an intervention is performed, you might stay overnight in the hospital.    9.  Discharge instructions will be given to you at the time of your procedure. 10.  For any questions or if you cannot keep this appointment for any reason, please call [phone].

## 2023-02-24 ENCOUNTER — OFFICE VISIT (OUTPATIENT)
Dept: FAMILY MEDICINE CLINIC | Age: 68
End: 2023-02-24
Payer: COMMERCIAL

## 2023-02-24 ENCOUNTER — TELEPHONE (OUTPATIENT)
Dept: CARDIOLOGY CLINIC | Age: 68
End: 2023-02-24

## 2023-02-24 VITALS
SYSTOLIC BLOOD PRESSURE: 118 MMHG | HEIGHT: 71 IN | OXYGEN SATURATION: 97 % | DIASTOLIC BLOOD PRESSURE: 80 MMHG | HEART RATE: 103 BPM | BODY MASS INDEX: 37.46 KG/M2 | WEIGHT: 267.6 LBS | TEMPERATURE: 96.9 F

## 2023-02-24 DIAGNOSIS — I48.21 PERMANENT ATRIAL FIBRILLATION (HCC): ICD-10-CM

## 2023-02-24 DIAGNOSIS — H25.013 CORTICAL AGE-RELATED CATARACT OF BOTH EYES: Primary | ICD-10-CM

## 2023-02-24 DIAGNOSIS — Z01.818 PRE-OPERATIVE EXAMINATION: ICD-10-CM

## 2023-02-24 DIAGNOSIS — Z79.01 LONG TERM CURRENT USE OF ANTICOAGULANT: ICD-10-CM

## 2023-02-24 PROBLEM — S86.019A: Status: RESOLVED | Noted: 2022-09-27 | Resolved: 2023-02-24

## 2023-02-24 PROCEDURE — 99214 OFFICE O/P EST MOD 30 MIN: CPT | Performed by: FAMILY MEDICINE

## 2023-02-24 PROCEDURE — 1123F ACP DISCUSS/DSCN MKR DOCD: CPT | Performed by: FAMILY MEDICINE

## 2023-02-24 SDOH — ECONOMIC STABILITY: INCOME INSECURITY: HOW HARD IS IT FOR YOU TO PAY FOR THE VERY BASICS LIKE FOOD, HOUSING, MEDICAL CARE, AND HEATING?: NOT HARD AT ALL

## 2023-02-24 SDOH — ECONOMIC STABILITY: FOOD INSECURITY: WITHIN THE PAST 12 MONTHS, YOU WORRIED THAT YOUR FOOD WOULD RUN OUT BEFORE YOU GOT MONEY TO BUY MORE.: NEVER TRUE

## 2023-02-24 SDOH — ECONOMIC STABILITY: HOUSING INSECURITY
IN THE LAST 12 MONTHS, WAS THERE A TIME WHEN YOU DID NOT HAVE A STEADY PLACE TO SLEEP OR SLEPT IN A SHELTER (INCLUDING NOW)?: NO

## 2023-02-24 SDOH — ECONOMIC STABILITY: FOOD INSECURITY: WITHIN THE PAST 12 MONTHS, THE FOOD YOU BOUGHT JUST DIDN'T LAST AND YOU DIDN'T HAVE MONEY TO GET MORE.: NEVER TRUE

## 2023-02-24 ASSESSMENT — PATIENT HEALTH QUESTIONNAIRE - PHQ9
1. LITTLE INTEREST OR PLEASURE IN DOING THINGS: 0
SUM OF ALL RESPONSES TO PHQ QUESTIONS 1-9: 0
SUM OF ALL RESPONSES TO PHQ9 QUESTIONS 1 & 2: 0
SUM OF ALL RESPONSES TO PHQ QUESTIONS 1-9: 0
2. FEELING DOWN, DEPRESSED OR HOPELESS: 0

## 2023-02-24 NOTE — PROGRESS NOTES
Jalen Flores is a 79 y.o. Male who came into the clinic today for his preoperative examination for phacoemulsification   with toric intraocular lens implant/intracameral antibiotic injection in the right eye on 3/15/2023 at the [address] [facility] The patient informs me that he has had surgery in the past and never   had any problems with anesthesia provided. Otherwise today he did not have any other questions or concerns and all   the question concerns were appropriately answered.     Past Medical History:   Diagnosis Date    Atrial fibrillation (HCC)     BPH (benign prostatic hyperplasia)     Urge incontinence        Patient Active Problem List   Diagnosis    Permanent atrial fibrillation (HCC)    Benign prostatic hyperplasia with urinary hesitancy    Urge incontinence of urine    Hypovitaminosis D    Pre-diabetes    Long term current use of anticoagulant    Cesar's deformity of right heel       Past Surgical History:   Procedure Laterality Date    ACHILLES TENDON SURGERY Left     ACHILLES TENDON SURGERY Right 9/29/2022    RIGHT ACHILLES SECONDAY RECONSTRUCTION WITH CALCANEUS EXCISION performed by Carolina Merino MD at [address] [facility]      2 2 level fusions    REFRACTIVE SURGERY Bilateral        Family History   Problem Relation Age of Onset    No Known Problems Mother     No Known Problems Father        Social History     Tobacco Use    Smoking status: Never    Smokeless tobacco: Never   Substance Use Topics    Alcohol use: Yes     Comment: occasional       Current Outpatient Medications on File Prior to Visit   Medication Sig Dispense Refill    aspirin EC 81 MG EC tablet Take 1 tablet by mouth daily 90 tablet 1    rivaroxaban (XARELTO) 20 MG TABS tablet Take 1 tablet by mouth Daily with supper 90 tablet 2    metoprolol succinate (TOPROL XL) 100 MG extended release tablet TAKE 1 TABLET BY MOUTH TWICE A DAY 60 tablet 3 oxybutynin (DITROPAN-XL) 5 MG extended release tablet Take 1 tablet by mouth daily 90 tablet 1    tamsulosin (FLOMAX) 0.4 MG capsule Take 1 capsule by mouth daily 90 capsule 1     No current facility-administered medications on file prior to visit. No Known Allergies    REVIEW OF SYSTEMS:   CONSTITUTIONAL: No weight loss, fever, chills, weakness or fatigue. HEENT: Eyes: No visual loss. Complains of blurred vision. No double vision or yellow sclerae. Ears, Nose, Throat: No hearing loss, sneezing, congestion, runny nose or sore throat. SKIN: No rash or itching. CARDIOVASCULAR: No chest pain, chest pressure or chest discomfort. No palpitations or edema. RESPIRATORY: No shortness of breath, cough or sputum. GASTROINTESTINAL: No anorexia, nausea, vomiting, diarrhea or constipation. No abdominal pain, hematochezia or melena. GENITOURINARY:No dysuria, urgency, frequency, hematuria. NEUROLOGICAL: No headache, dizziness, syncope, paralysis, ataxia,   numbness or tingling in the extremities. No change in bowel or bladder control. MUSCULOSKELETAL: No muscle pain, back pain, joint pain or stiffness. PSYCHIATRIC: No history of depression or anxiety. ENDOCRINOLOGIC: No reports of sweating, cold or heat intolerance. No polyuria or polydipsia. Objective     . /80   Pulse (!) 103   Temp 96.9 °F (36.1 °C)   Ht 5' 11\" (1.803 m)   Wt 267 lb 9.6 oz (121.4 kg)   SpO2 97%   BMI 37.32 kg/m²     GENERAL:  Nery Calderon is a 79 y.o.  male who is not in any acute distress. He was well attired and well groomed and was speaking in full sentences. HEENT:  Head:  Atraumatic, normocephalic. Eyes: Both the pupils are round,   equal and reactive to light. No squint noted. Ears: Both external auditory canals are clear. No discharge noted. Tympanic membranes healthy. Normal light reflex is seen. Nose: Both the   nares are clear. No discharge noted. No epistaxis.  Throat:  No posterior   pharyngeal wall erythema. Oral mucosa moist. No enlarged tonsils. NECK:  Supple. No cervical lymphadenopathy. No thyromegaly. LUNGS:  Normal ventilatory breath sounds are heard bilaterally. No crackles   or wheezes heard. CARDIOVASCULAR:  Normal S1, S2 heard. No murmurs heard. No JVD. EXTREMITIES:  All 4 extremities were moving fine. Full range of motion is   present. No deformity noted. Peripheral pulses were felt. No lower   extremity edema. Neurovascular integrity maintained. NEUROLOGICAL:  The patient was alert, conscious, and cooperative. Oriented   to time, place, and person. Muscle power in all 4 limbs is 5/5. Cranial   nerves II thru XII are grossly intact. No sensory or motor loss. Assessment/Plan     Diagnoses and all orders for this visit:    Cortical age-related cataract of both eyes    Pre-operative examination    Permanent atrial fibrillation ([address]    Long term current use of anticoagulant        Advise given:  - Patient is cleared for above-mentioned surgery. - Take all prescription medication as prescribed. - The importance of keeping the blood pressure monitoring to prevent stroke, heart attacks, kidney failure, blindness, and loss of limbs was reviewed. - Appropriate handout were given to the patient. -  All the questions and concerns were appropriately answered. - Patient / family member / caregiver verbalized understanding of patient instructions from today's visit. - The patient was advised to follow up with me in 3 months for recheck or can call me before if has any other questions or concerns.

## 2023-02-24 NOTE — TELEPHONE ENCOUNTER
Spoke with the patient and had to move his procedure time slightly due to the Dr schedule not being correct in [facility]. He wanted to stay on same day and happy to not get up so early. He verbalized understanding. Left Heart Catheterization     A left heart catheterization is a procedure that provides your cardiologist with detailed information regarding how your heart functions. A small catheter (long, fine tube) is inserted into an artery (a vessel that carries blood and oxygen) that leads to your heart. While watching with x-ray equipment, small amounts of dye are injected which enables visualization of the heart arteries and chambers. The pictures that your cardiologist receives from the cardiac catheterization enable him or her to decide on the best treatment for you. Date of the procedure:   3/1/2023     Time of arrival:  12:00 pm   Procedure time: 2:00 Pm         Cardiologist performing the procedure:  Osei     Instructions for your left heart catheterization:     1. Bring a list of your medications to the hospital.     2.  Please notify us before the procedure if you are allergic to anything; especially x-ray contrast dye, iodine, nickel, or any type of jewelry. This is very important! 3. Do not eat or drink anything at all after midnight (or 8 hours) prior to the procedure. 4.  If you are on Coumadin, Warfarin, or Carliss Anger, please notify us so that we can make adjustments to your medication. 5.  If you are taking Xarelto, Eliquis, or Pradaxa, please stop staking these medications two days prior to the procedure (including the day of the procedure). 6.  Take all other morning medications EXCEPT any diuretics (water pills) the day of the procedure with a small sip of water. 7.  If you are diabetic, check your blood sugar in the morning. If your blood sugar is 120 or less, do not take insulin.   If your blood sugar is more than 120, take half the dose of your normal insulin. Do not take Metformin the night before your procedure or morning of the procedure. 8.  You MUST have someone to drive you home--no driving for 24 hours after your procedure. If an intervention is performed, you might stay overnight in the hospital.     9.  Discharge instructions will be given to you at the time of your procedure. 10.  For any questions or if you cannot keep this appointment for any reason, please call [phone].        Qgenda updated / alerted cath lab [facility])

## 2023-02-28 NOTE — PROGRESS NOTES
Called patient about procedure. Told to be here at 1230 for procedure at 1400. Must be NPO after midnight but can take morning medication with sips of water. Patient stated they last took blood thinner Saturday. Told to have a responsible adult with them to take them home and stay with them afterwards, if they do not get admitted to hospital. Also, to bring a current list of medications. No other questions or concerns.

## 2023-03-01 ENCOUNTER — HOSPITAL ENCOUNTER (OUTPATIENT)
Dept: CARDIAC CATH/INVASIVE PROCEDURES | Age: 68
Discharge: HOME | End: 2023-03-03
Payer: COMMERCIAL

## 2023-03-01 VITALS — HEIGHT: 71 IN | WEIGHT: 267 LBS | BODY MASS INDEX: 37.38 KG/M2

## 2023-03-01 LAB
A/G RATIO: 1.3 (ref 1.1–2.2)
ALBUMIN SERPL-MCNC: 4 G/DL (ref 3.4–5)
ALP BLD-CCNC: 62 U/L (ref 40–129)
ALT SERPL-CCNC: 11 U/L (ref 10–40)
ANION GAP SERPL CALCULATED.3IONS-SCNC: 8 MMOL/L (ref 3–16)
AST SERPL-CCNC: 18 U/L (ref 15–37)
BILIRUB SERPL-MCNC: 0.4 MG/DL (ref 0–1)
BUN BLDV-MCNC: 22 MG/DL (ref 7–20)
CALCIUM SERPL-MCNC: 8.9 MG/DL (ref 8.3–10.6)
CHLORIDE BLD-SCNC: 107 MMOL/L (ref 99–110)
CHOLESTEROL, TOTAL: 166 MG/DL (ref 0–199)
CO2: 26 MMOL/L (ref 21–32)
CREAT SERPL-MCNC: 0.8 MG/DL (ref 0.8–1.3)
GFR SERPL CREATININE-BSD FRML MDRD: >60 ML/MIN/{1.73_M2}
GLUCOSE BLD-MCNC: 98 MG/DL (ref 70–99)
HCT VFR BLD CALC: 41.7 % (ref 40.5–52.5)
HDLC SERPL-MCNC: 42 MG/DL (ref 40–60)
HEMOGLOBIN: 13.9 G/DL (ref 13.5–17.5)
INR BLD: 0.8 (ref 0.88–1.12)
INR BLD: 0.98 (ref 0.87–1.14)
LDL CHOLESTEROL CALCULATED: 104 MG/DL
MCH RBC QN AUTO: 29.6 PG (ref 26–34)
MCHC RBC AUTO-ENTMCNC: 33.4 G/DL (ref 31–36)
MCV RBC AUTO: 88.6 FL (ref 80–100)
PDW BLD-RTO: 14.2 % (ref 12.4–15.4)
PLATELET # BLD: 224 K/UL (ref 135–450)
PMV BLD AUTO: 8.7 FL (ref 5–10.5)
POTASSIUM SERPL-SCNC: 4.5 MMOL/L (ref 3.5–5.1)
PROTHROMBIN TIME: 12.9 SEC (ref 11.7–14.5)
RBC # BLD: 4.7 M/UL (ref 4.2–5.9)
SODIUM BLD-SCNC: 141 MMOL/L (ref 136–145)
TOTAL PROTEIN: 7.2 G/DL (ref 6.4–8.2)
TRIGL SERPL-MCNC: 100 MG/DL (ref 0–150)
VLDLC SERPL CALC-MCNC: 20 MG/DL
WBC # BLD: 7.5 K/UL (ref 4–11)

## 2023-03-01 PROCEDURE — 80061 LIPID PANEL: CPT

## 2023-03-01 PROCEDURE — 85027 COMPLETE CBC AUTOMATED: CPT

## 2023-03-01 PROCEDURE — 93005 ELECTROCARDIOGRAM TRACING: CPT | Performed by: INTERNAL MEDICINE

## 2023-03-01 PROCEDURE — C1769 GUIDE WIRE: HCPCS

## 2023-03-01 PROCEDURE — C1894 INTRO/SHEATH, NON-LASER: HCPCS

## 2023-03-01 PROCEDURE — 6360000004 HC RX CONTRAST MEDICATION: Performed by: INTERNAL MEDICINE

## 2023-03-01 PROCEDURE — 99152 MOD SED SAME PHYS/QHP 5/>YRS: CPT | Performed by: INTERNAL MEDICINE

## 2023-03-01 PROCEDURE — 93458 L HRT ARTERY/VENTRICLE ANGIO: CPT | Performed by: INTERNAL MEDICINE

## 2023-03-01 PROCEDURE — 85610 PROTHROMBIN TIME: CPT

## 2023-03-01 PROCEDURE — 99153 MOD SED SAME PHYS/QHP EA: CPT

## 2023-03-01 PROCEDURE — 2500000003 HC RX 250 WO HCPCS

## 2023-03-01 PROCEDURE — 93458 L HRT ARTERY/VENTRICLE ANGIO: CPT

## 2023-03-01 PROCEDURE — 99152 MOD SED SAME PHYS/QHP 5/>YRS: CPT

## 2023-03-01 PROCEDURE — 80053 COMPREHEN METABOLIC PANEL: CPT

## 2023-03-01 PROCEDURE — 6360000002 HC RX W HCPCS

## 2023-03-01 PROCEDURE — 2709999900 HC NON-CHARGEABLE SUPPLY

## 2023-03-01 RX ORDER — SODIUM CHLORIDE 0.9 % (FLUSH) 0.9 %
5-40 SYRINGE (ML) INJECTION PRN
Status: DISCONTINUED | OUTPATIENT
Start: 2023-03-01 | End: 2023-03-04 | Stop reason: HOSPADM

## 2023-03-01 RX ORDER — ATORVASTATIN CALCIUM 10 MG/1
10 TABLET, FILM COATED ORAL DAILY
Qty: 30 TABLET | Refills: 3 | Status: SHIPPED | OUTPATIENT
Start: 2023-03-01

## 2023-03-01 RX ORDER — ACETAMINOPHEN 325 MG/1
650 TABLET ORAL EVERY 4 HOURS PRN
Status: DISCONTINUED | OUTPATIENT
Start: 2023-03-01 | End: 2023-03-04 | Stop reason: HOSPADM

## 2023-03-01 RX ORDER — SODIUM CHLORIDE 0.9 % (FLUSH) 0.9 %
5-40 SYRINGE (ML) INJECTION EVERY 12 HOURS SCHEDULED
Status: DISCONTINUED | OUTPATIENT
Start: 2023-03-01 | End: 2023-03-04 | Stop reason: HOSPADM

## 2023-03-01 RX ORDER — SODIUM CHLORIDE 9 MG/ML
INJECTION, SOLUTION INTRAVENOUS CONTINUOUS
Status: ACTIVE | OUTPATIENT
Start: 2023-03-01 | End: 2023-03-01

## 2023-03-01 RX ADMIN — IOHEXOL 150 ML: 350 INJECTION, SOLUTION INTRAVENOUS at 14:18

## 2023-03-01 NOTE — PROCEDURES
CARDIAC CATHETERIZATION      Vivian Fernandes (27 y.o., male)  1955  [id number]    3/1/2023        INDICATION(s):  Abnormal stress test      PROCEDURE:    Left heart cath  Coronary angiography      Risk, benefits alternatives to cardiac catheterization and possible intervention were discussed with the patient. Informed consent was obtained. The patient was brought to the cath lab and was prepped and draped in the normal sterile technique. 1% Lidocaine was used to anesthetize the site. The right radial was cannulated and a 6 Fr sheath was inserted under ultrasonographic guidance. Continuous pulse-oximetry and ECG monitoring was performed, and frequent blood pressure assessment was performed. An independent trained observer pushed medications at my direction. We monitored the patient's level of consciousness and vital signs/physiologic status throughout the procedure (see start and stop times below). All catheter were advanced through the sheath over a guide wire and advanced under fluoroscopic guidance into the ascending aorta. We used 5 Fr catheters for right and left coronary angiography and to cross the aortic valve. Left ventricular pressure and pullback across aortic valve was recorded. The sheath was removed and hemostasis was obtained with a TR band. The patient was moved to the floor in stable condition. There were no complications. Sedation: 2 mg Versed, 0 mcg Fentanyl  Sedation start: 1400  Sedation stop: 1417      Estimated blood loss: <10 mL      HEMODYNAMIC / ANGIOGRAPHIC DATA:      /72, 91; /6, 12 Left ventricular end diastolic pressure was 9 mmHg. There was no gradient across the aortic valve upon pullback. The left main coronary artery arises from the left coronary cusp giving rise to the left anterior descending artery and the left circumflex artery. The left main reveals no angiographically significant stenosis.   The left anterior descending artery arises in normal fashion from left coronary giving rise to diagonals and septal branches. The LAD reveals mild luminal irregularities. The left circumflex/obtuse marginal system reveals no significant stenosis. The right coronary artery is a right dominant vessel. It reveals up to 20% stenosis in its midportion.     CONCLUSIONS:  Nonobstructive CAD      RECOMMENDATIONS:    Routine post cardiac catheterization care  Aggressive risk factor modification        Electronically signed by Jerry Finley MD on 3/1/2023 at 2:42 PM

## 2023-03-01 NOTE — DISCHARGE INSTRUCTIONS
[facility] [employer]  Cardiovascular Special Procedures   Radial/Brachial Access Angiogram  Patient Discharge Instructions      Day 1 (Procedure Day):  Rest for the remainder of the day. Avoid excessive use of the affected arm. Do not drive a car. Do not be alone. Leave dressing intact. Day 2:  You may drive a car, unless otherwise directed by physician. Remove dressing. You may bathe/shower, but wash gently around the puncture site and pat dry. Place new band-aid on site daily until skin heals. Things to Avoid:  You may not do any strenuous exercises for one week. (ex: golfing, bowling, tennis, vacuum, snow removal, jogging, and aerobic exercises). No hot tubs, baths, or pools for 3-5 days. Do not lift anything over 3-5 pounds for 3 days, with affected arm. No lotions, powders, or ointments near site for 5 days. Things to watch for:  You may have a small lump or a bruise. This is common and will go away. If bleeding occurs from the site, or a hematoma (lump) begins to increase in size, immediately apply pressure directly over the site and call 911 to return to the hospital.  Report any coolness or numbness in the arm or hand immediately. Report any excessive pain of the arm or hand immediately. If mild discomfort occurs at the puncture site you may place an ice pack on the site at 15 minute intervals. .   Watch for signs and symptoms of an infection at the arm site (fever, local pain, redness, warmth or discharge from the puncture site), call your physician immediately if any develop. Other:  No work/school for 72 hours if you received a stent; otherwise you may go back to work the following day (as long as your job does not interfere with the lifting restrictions). Any Questions please call us at [phone] (between 7am- 5pm, Mon-Fri). After hours you should contact your physician.           [facility] [employer]  Cardiovascular Special Procedures  General Discharge Instructions    PROCEDURE: ____________________________________________________    ____ Kimberley Sessions may be drowsy or lightheaded after receiving sedation. DO NOT operate a vehicle (automobile, bicycle, motorcycle, machinery, or power tools), make any important decisions or sign any important/legal documents, or drink alcoholic beverages for the next 24 hours  ____ We strongly suggest that a responsible adult be with you for the next 24 hours for your protection and safety  ____ If the intravenous catheter site is painful, apply warm wet compresses on the site until the soreness is relieved and elevate the arm above the heart. Call your physician if no improvement in 2 to 3 days    DIETARY INSTRUCTIONS:    ____ Drink extra fluids over the next 24 hours (If not contraindicated by illness or by                   physician order)  ____ Start with clear liquids and progress to normal diet as you feel like eating. If you experience nausea or repeated episodes of vomiting, which persist beyond 12-24 hours, notify your doctor        ____ Resume your previous diet      MEDICATION INSTRUCTIONS:    ____ See Medication Reconciliation Sheet      SPECIAL INSTRUCTIONS:  ________________________________________________________________________________________________________________________________________________________________________________________________________________________                                                                                                                                                                                                                                                                                                Please make sure that you follow-up with your doctors office for your results.     Call: ____[phone]_____________________________     FOLLOW-UP APPOINTMENT    Follow up with MD on  march 17 at 8 :00 am in [place] office address [facility]  [id number]    Belongings returned to patient and/or family:     The Discharge Instructions have been explained to me. I understand and can verbalize these instructions.

## 2023-03-01 NOTE — PRE SEDATION
Brief Pre-Op Note/Sedation Assessment      Cinthya Vaughn  1955  [id number]  1:57 PM    Planned Procedure: Cardiac Catheterization Procedure  Post Procedure Plan: Return to same level of care  Consent: {PROC CONSCIOUS SEDATION CONSENT:18770}        Chief Complaint:   {Chief Complaint Cardiac Cath:60052}      Indications for Cath Procedure:  Presentation:  { CAD PRESENTATION:98739}  2. Anginal Classification within 2 weeks:  {ANGINAL CLASSIFICATION:132163837}  3. Angina Symptoms Assessment:  {Angina Symptoms Assessment:31039}  4. Heart Failure Class within last 2 weeks:  {Cath Heart Failure Class:95931}  5. Cardiovascular Instability:  {Cardiovascular No/Yes:83544}    Prior Ischemic Workup/Eval:  Pre-Procedural Medications: { ANTI-ANGINAL MEDS:29112}  2. Stress Test Completed? {Cath Stress Test (No/Yes):79482}    Does Patient need surgery? Cath Valve Surgery:  {Cath Valve Surgery:92963}    Pre-Procedure Medical History:  Vital Signs:  Ht 5' 11\" (1.803 m)   Wt 267 lb (121.1 kg)   BMI 37.24 kg/m²     Allergies:  No Known Allergies  Medications:    Current Outpatient Medications   Medication Sig Dispense Refill    aspirin EC 81 MG EC tablet Take 1 tablet by mouth daily 90 tablet 1    rivaroxaban (XARELTO) 20 MG TABS tablet Take 1 tablet by mouth Daily with supper 90 tablet 2    metoprolol succinate (TOPROL XL) 100 MG extended release tablet TAKE 1 TABLET BY MOUTH TWICE A DAY 60 tablet 3    oxybutynin (DITROPAN-XL) 5 MG extended release tablet Take 1 tablet by mouth daily 90 tablet 1    tamsulosin (FLOMAX) 0.4 MG capsule Take 1 capsule by mouth daily 90 capsule 1     No current facility-administered medications for this encounter.        Past Medical History:    Past Medical History:   Diagnosis Date    Atrial fibrillation (HCC)     BPH (benign prostatic hyperplasia)     Urge incontinence        Surgical History:    Past Surgical History:   Procedure Laterality Date    ACHILLES TENDON SURGERY Left ACHILLES TENDON SURGERY Right 9/29/2022    RIGHT ACHILLES SECONDAY RECONSTRUCTION WITH CALCANEUS EXCISION performed by Reta Dakins, MD at [address] [facility] [address]      2 2 level fusions    REFRACTIVE SURGERY Bilateral              Pre-Sedation:  Pre-Sedation Documentation and Exam:  {Cath Pre Sedation Exam:79450}    Prior History of Anesthesia Complications:   {PROC CONSCIOUS SEDATION PRIOR COMPLICATIONS:20526}    Modified Mallampati:  {Mallampati:60971::I (soft palate, uvula, fauces, tonsillar pillars visible)}    ASA Classification:  {PROC ASA CLASSIFICATION:20531}    Jaki Scale: Activity:  {ACTIVITY JAKI FLMZI:207977135::\"7 - Able to move 4 extremities voluntarily on command\"}  Respiration:  {RESPIRATION JAKI CKTXV:496196136::\"1 - Able to breathe deeply and cough freely\"}  Circulation:  {CIRCULATION JAKI LKOID:162343919::\"3 - BP+/- 20mmHg of normal\"}  Consciousness:  {CONSCIOUSNESS JAKI VMXPR:398845561::\"5 - Fully awake\"}  Oxygen Saturation (color):  {OXYGEN SATURATION JAKI VGBVL:654176058::\"6 - Able to maintain oxygen saturation >92% on room air\"}    Sedation/Anesthesia Plan:  Guard the patient's safety and welfare. Minimize physical discomfort and pain. Minimize negative psychological responses to treatment by providing sedation and analgesia and maximize the potential amnesia. Patient to meet pre-procedure discharge plan.     Medication Planned:  {Cath Lab Sedation K6599834    Patient is an appropriate candidate for plan of sedation:   {yes no free [facility]:906137}      Electronically signed by Sarah Sams MD on 3/1/2023 at 1:57 PM

## 2023-03-02 ENCOUNTER — TELEPHONE (OUTPATIENT)
Dept: CARDIOLOGY CLINIC | Age: 68
End: 2023-03-02

## 2023-03-02 ENCOUNTER — PREP FOR PROCEDURE (OUTPATIENT)
Dept: CARDIOLOGY CLINIC | Age: 68
End: 2023-03-02

## 2023-03-02 RX ORDER — FLECAINIDE ACETATE 100 MG/1
100 TABLET ORAL 2 TIMES DAILY
Qty: 60 TABLET | Refills: 5 | Status: SHIPPED | OUTPATIENT
Start: 2023-03-02

## 2023-03-02 NOTE — TELEPHONE ENCOUNTER
Per UL, start flecainide 100 mg BID (LHC showed nonobstructive CAD). Pt scheduled to come in for an ECG. Pt verbalized understanding. Confirmed that he is back on Xarelto after his cath. Scheduled for AF ablation on 4/12/23.

## 2023-03-03 LAB
EKG ATRIAL RATE: 208 BPM
EKG DIAGNOSIS: NORMAL
EKG Q-T INTERVAL: 404 MS
EKG QRS DURATION: 92 MS
EKG QTC CALCULATION (BAZETT): 480 MS
EKG R AXIS: 56 DEGREES
EKG T AXIS: 67 DEGREES
EKG VENTRICULAR RATE: 85 BPM

## 2023-03-10 ENCOUNTER — TELEPHONE (OUTPATIENT)
Dept: CARDIOLOGY CLINIC | Age: 68
End: 2023-03-10

## 2023-03-10 ENCOUNTER — NURSE ONLY (OUTPATIENT)
Dept: CARDIOLOGY CLINIC | Age: 68
End: 2023-03-10
Payer: COMMERCIAL

## 2023-03-10 DIAGNOSIS — I48.21 PERMANENT ATRIAL FIBRILLATION (HCC): Primary | ICD-10-CM

## 2023-03-10 PROCEDURE — 93000 ELECTROCARDIOGRAM COMPLETE: CPT | Performed by: INTERNAL MEDICINE

## 2023-03-10 NOTE — PROGRESS NOTES
Called to inform patient Arthur Mclean  informed pt to continue flecainide as instructed with upcoming appointment on 4/19/2023

## 2023-03-18 DIAGNOSIS — R39.11 BENIGN PROSTATIC HYPERPLASIA WITH URINARY HESITANCY: ICD-10-CM

## 2023-03-18 DIAGNOSIS — N40.1 BENIGN PROSTATIC HYPERPLASIA WITH URINARY HESITANCY: ICD-10-CM

## 2023-03-18 DIAGNOSIS — N39.41 URGE INCONTINENCE OF URINE: ICD-10-CM

## 2023-03-20 DIAGNOSIS — R39.11 BENIGN PROSTATIC HYPERPLASIA WITH URINARY HESITANCY: ICD-10-CM

## 2023-03-20 DIAGNOSIS — N39.41 URGE INCONTINENCE OF URINE: ICD-10-CM

## 2023-03-20 DIAGNOSIS — N40.1 BENIGN PROSTATIC HYPERPLASIA WITH URINARY HESITANCY: ICD-10-CM

## 2023-03-20 RX ORDER — TAMSULOSIN HYDROCHLORIDE 0.4 MG/1
CAPSULE ORAL
Qty: 90 CAPSULE | Refills: 1 | OUTPATIENT
Start: 2023-03-20

## 2023-03-20 RX ORDER — OXYBUTYNIN CHLORIDE 5 MG/1
TABLET, EXTENDED RELEASE ORAL
Qty: 90 TABLET | Refills: 1 | OUTPATIENT
Start: 2023-03-20

## 2023-03-21 RX ORDER — TAMSULOSIN HYDROCHLORIDE 0.4 MG/1
0.4 CAPSULE ORAL DAILY
Qty: 90 CAPSULE | Refills: 0 | Status: SHIPPED | OUTPATIENT
Start: 2023-03-21

## 2023-03-21 RX ORDER — METOPROLOL SUCCINATE 100 MG/1
100 TABLET, EXTENDED RELEASE ORAL 2 TIMES DAILY
Qty: 180 TABLET | Refills: 3 | Status: SHIPPED | OUTPATIENT
Start: 2023-03-21

## 2023-03-21 RX ORDER — OXYBUTYNIN CHLORIDE 5 MG/1
5 TABLET, EXTENDED RELEASE ORAL DAILY
Qty: 90 TABLET | Refills: 0 | Status: SHIPPED | OUTPATIENT
Start: 2023-03-21

## 2023-03-21 NOTE — TELEPHONE ENCOUNTER
Requested Prescriptions     Pending Prescriptions Disp Refills    metoprolol succinate (TOPROL XL) 100 MG extended release tablet 180 tablet 3     Sig: Take 1 tablet by mouth 2 times daily              Last Office Visit: 2/17/2023     Next Office Visit: 3/31/2023

## 2023-03-24 ASSESSMENT — ENCOUNTER SYMPTOMS
ABDOMINAL PAIN: 0
VOMITING: 0

## 2023-03-27 RX ORDER — ATORVASTATIN CALCIUM 10 MG/1
TABLET, FILM COATED ORAL
Qty: 30 TABLET | Refills: 3 | Status: SHIPPED | OUTPATIENT
Start: 2023-03-27

## 2023-03-27 RX ORDER — FLECAINIDE ACETATE 100 MG/1
TABLET ORAL
Qty: 60 TABLET | Refills: 5 | Status: SHIPPED | OUTPATIENT
Start: 2023-03-27

## 2023-03-27 NOTE — TELEPHONE ENCOUNTER
Requested Prescriptions     Pending Prescriptions Disp Refills    flecainide (TAMBOCOR) 100 MG tablet [Pharmacy Med Name: FLECAINIDE ACETATE 100 MG TAB] 60 tablet 5     Sig: TAKE 1 TABLET BY MOUTH TWICE A DAY          Number: 60    Refills: 5    Last Office Visit: 2/14/2023     Next Office Visit: 4/24/2023     Last Labs: 3/1/23

## 2023-03-31 ENCOUNTER — OFFICE VISIT (OUTPATIENT)
Dept: CARDIOLOGY CLINIC | Age: 68
End: 2023-03-31
Payer: COMMERCIAL

## 2023-03-31 VITALS
HEIGHT: 71 IN | WEIGHT: 274 LBS | BODY MASS INDEX: 38.36 KG/M2 | HEART RATE: 87 BPM | DIASTOLIC BLOOD PRESSURE: 82 MMHG | SYSTOLIC BLOOD PRESSURE: 116 MMHG | OXYGEN SATURATION: 98 %

## 2023-03-31 DIAGNOSIS — I25.10 CORONARY ARTERY DISEASE INVOLVING NATIVE HEART, UNSPECIFIED VESSEL OR LESION TYPE, UNSPECIFIED WHETHER ANGINA PRESENT: ICD-10-CM

## 2023-03-31 DIAGNOSIS — Z98.890 S/P CORONARY ANGIOGRAM: ICD-10-CM

## 2023-03-31 DIAGNOSIS — I48.21 PERMANENT ATRIAL FIBRILLATION (HCC): Primary | ICD-10-CM

## 2023-03-31 PROCEDURE — 99214 OFFICE O/P EST MOD 30 MIN: CPT | Performed by: INTERNAL MEDICINE

## 2023-03-31 PROCEDURE — 1123F ACP DISCUSS/DSCN MKR DOCD: CPT | Performed by: INTERNAL MEDICINE

## 2023-03-31 RX ORDER — RIVAROXABAN 20 MG/1
TABLET, FILM COATED ORAL
Qty: 90 TABLET | Refills: 1 | Status: SHIPPED | OUTPATIENT
Start: 2023-03-31

## 2023-03-31 RX ORDER — METOPROLOL SUCCINATE 100 MG/1
TABLET, EXTENDED RELEASE ORAL
Qty: 180 TABLET | Refills: 1 | Status: SHIPPED | OUTPATIENT
Start: 2023-03-31

## 2023-03-31 NOTE — PROGRESS NOTES
tablet by mouth daily 3/1/23   Estelita Nguyen MD   aspirin EC 81 MG EC tablet Take 1 tablet by mouth daily 1/20/23   Estelita Nguyen MD   rivaroxaban Debra Findvalentine) 20 MG TABS tablet Take 1 tablet by mouth Daily with supper 1/20/23   Estelita Nguyen MD        Allergy:     Patient has no known allergies. Review of Systems:     Review of Systems   Constitutional:  Negative for chills and fever. Respiratory:          See HPI   Cardiovascular:         See HPI. Gastrointestinal:  Negative for abdominal pain and vomiting. Endocrine: Negative. Genitourinary: Negative. Skin: Negative. Psychiatric/Behavioral: Negative. All other systems reviewed and are negative. Physical Examination:     There were no vitals filed for this visit. Wt Readings from Last 3 Encounters:   03/01/23 267 lb (121.1 kg)   02/24/23 267 lb 9.6 oz (121.4 kg)   02/17/23 269 lb 3.2 oz (122.1 kg)       Physical Exam  Constitutional:       Appearance: Normal appearance. HENT:      Head: Normocephalic and atraumatic. Nose: Nose normal.   Eyes:      Conjunctiva/sclera: Conjunctivae normal.   Cardiovascular:      Rate and Rhythm: Normal rate. Rhythm irregular. Heart sounds: Normal heart sounds. Pulmonary:      Effort: Pulmonary effort is normal.      Breath sounds: Normal breath sounds. Abdominal:      Palpations: Abdomen is soft. Musculoskeletal:      Cervical back: Neck supple. Skin:     General: Skin is warm and dry. Neurological:      General: No focal deficit present. Mental Status: He is alert.          Labs:     Lab Results   Component Value Date    WBC 7.5 03/01/2023    HGB 13.9 03/01/2023    HCT 41.7 03/01/2023    MCV 88.6 03/01/2023     03/01/2023     Lab Results   Component Value Date     03/01/2023    K 4.5 03/01/2023     03/01/2023    CO2 26 03/01/2023    BUN 22 (H) 03/01/2023    CREATININE 0.8 03/01/2023    GLUCOSE 98 03/01/2023    CALCIUM 8.9 03/01/2023    PROT 7.2 03/01/2023
~Right Atrium: Mild enlargement of the right atrium. ~Mitral Valve: Normal mitral valve structure and mobility. Mild mitral valve regurgitation. ~Aortic Valve: Tri-leaflet aortic valve. Normal aortic valve structure and mobility. ~Tricuspid Valve: Normal tricuspid valve structure and mobility. Mild to moderate tricuspid valve regurgitation. The pulmonary artery pressure is normal.     ~Pulmonic Valve: Pulmonary valve not well visualized. Mild pulmonic regurgitation. ~Great Vessels: Normal size aorta. Normal IVC size and normal respiratory collapse consistent with normal right atrial pressure. ~Pericardium: No pericardial effusion. Last CMR  (if available):    Last Coronary Artery Calcium Score: Ankle-brachial index:    Carotid ultrasound screening:    Abdominal aortic aneurysm screening:    Assessment / Plan:     1. Permanent atrial fibrillation ([address]    2. Coronary artery disease involving native heart, unspecified vessel or lesion type, unspecified whether angina present    3. S/P coronary angiogram      Doing well. BP controlled. Continue statin; labs in 4-6 weeks  Tolerating flecainide  Ablation as scheduled  RTC in 6 months     Orders Placed This Encounter   Procedures    Comprehensive Metabolic Panel    LIPID PANEL       Return in about 6 months (around 9/30/2023). The note was completed using EMR and Dragon dictation system. Every effort was made to ensure accuracy; however, inadvertent computerized transcription errors may be present. All questions and concerns were addressed to the patient. I would like to thank you for providing me the opportunity to participate in the care of your patient. If you have any questions, please do not hesitate to contact me.      Dian Simon MD, [facility] [address]  [address] [place] [address] [address] [address]  Main Office Phone: [phone]  Fax: [phone]

## 2023-04-04 ENCOUNTER — OFFICE VISIT (OUTPATIENT)
Dept: ORTHOPEDIC SURGERY | Age: 68
End: 2023-04-04
Payer: COMMERCIAL

## 2023-04-04 VITALS — WEIGHT: 274 LBS | HEIGHT: 71 IN | BODY MASS INDEX: 38.36 KG/M2

## 2023-04-04 DIAGNOSIS — S86.019A AVULSION OF ACHILLES TENDON: ICD-10-CM

## 2023-04-04 DIAGNOSIS — M92.61 HAGLUND'S DEFORMITY OF RIGHT HEEL: Primary | ICD-10-CM

## 2023-04-04 PROCEDURE — 1123F ACP DISCUSS/DSCN MKR DOCD: CPT | Performed by: ORTHOPAEDIC SURGERY

## 2023-04-04 PROCEDURE — 99213 OFFICE O/P EST LOW 20 MIN: CPT | Performed by: ORTHOPAEDIC SURGERY

## 2023-04-04 NOTE — PROGRESS NOTES
is 274 lb (124.3 kg), Body mass index is 38.22 kg/m². Resting respiratory rate is 16. The patient walks with no limp. The incision healing well . No signs of any erythema or drainage, mild swelling. He has no pain with the active or passive range of motion of the right ankle and subtalar, but improving ROM. He has intact sensation distally, and he is neurovascularly intact. There is palpable thickening mid substance Achilles tendon that is nontender to palpation. Ankle reflex 1+ bilaterally. Good strength, and no instability both upper and lower extremities. Ankle reflex 1+ bilaterally. Good strength, and no instability both upper and lower extremities. IMAGING:  Two views right calcaneus taken today in the office showed removal of Cesar's, no acute fracture. There is calcification in the Achilles tendon. IMPRESSION: 6 months out from right foot partal Cesar's calcaneus excision, 2ry repair Achillis tendon, and doing very well. PLAN:He will be WBAT and continue to work on  ROM and peroneal strengthening exercise. He was instructed to continue to work on the exercises given to him by physical therapy. He can go back to normal activity with no restrictions. Follow-up as needed. NSAIDs OTC. I told the patient that it is not unusual to have some achy pain and swelling for up to a year after his surgery.       Reginald Hennessy MD

## 2023-04-05 ENCOUNTER — TELEPHONE (OUTPATIENT)
Dept: CARDIOLOGY CLINIC | Age: 68
End: 2023-04-05

## 2023-04-05 NOTE — TELEPHONE ENCOUNTER
Spoke with pt and confirmed Afib Ablation  on 04/12/2023, pt arriving at [facility] at . Reviewed all preop instructions previously given to pt. Pt verbalized understanding.

## 2023-04-12 ENCOUNTER — HOSPITAL ENCOUNTER (OUTPATIENT)
Dept: CARDIAC CATH/INVASIVE PROCEDURES | Age: 68
Setting detail: OUTPATIENT SURGERY
Discharge: HOME | End: 2023-04-12
Attending: INTERNAL MEDICINE
Payer: COMMERCIAL

## 2023-04-12 VITALS
TEMPERATURE: 97.9 F | OXYGEN SATURATION: 93 % | SYSTOLIC BLOOD PRESSURE: 99 MMHG | HEIGHT: 71 IN | HEART RATE: 76 BPM | RESPIRATION RATE: 15 BRPM | BODY MASS INDEX: 37.74 KG/M2 | WEIGHT: 269.6 LBS | DIASTOLIC BLOOD PRESSURE: 88 MMHG

## 2023-04-12 PROBLEM — I48.4 ATYPICAL ATRIAL FLUTTER (HCC): Status: ACTIVE | Noted: 2023-04-12

## 2023-04-12 PROBLEM — I48.11 LONGSTANDING PERSISTENT ATRIAL FIBRILLATION (HCC): Status: ACTIVE | Noted: 2023-04-12

## 2023-04-12 LAB
EKG ATRIAL RATE: 227 BPM
EKG DIAGNOSIS: NORMAL
EKG Q-T INTERVAL: 402 MS
EKG QRS DURATION: 106 MS
EKG QTC CALCULATION (BAZETT): 440 MS
EKG R AXIS: 67 DEGREES
EKG T AXIS: 82 DEGREES
EKG VENTRICULAR RATE: 72 BPM
POC ACT LR: 194 SEC
POC ACT LR: 248 SEC
POC ACT LR: 281 SEC
POC ACT LR: 296 SEC
POC ACT LR: 324 SEC
POC ACT LR: 363 SEC
POC ACT LR: 370 SEC
POC ACT LR: 377 SEC
POC ACT LR: 387 SEC
POC ACT LR: 391 SEC
POC ACT LR: 397 SEC

## 2023-04-12 PROCEDURE — C1732 CATH, EP, DIAG/ABL, 3D/VECT: HCPCS

## 2023-04-12 PROCEDURE — 7100000001 HC PACU RECOVERY - ADDTL 15 MIN

## 2023-04-12 PROCEDURE — C1894 INTRO/SHEATH, NON-LASER: HCPCS

## 2023-04-12 PROCEDURE — 93623 PRGRMD STIMJ&PACG IV RX NFS: CPT | Performed by: INTERNAL MEDICINE

## 2023-04-12 PROCEDURE — 93657 TX L/R ATRIAL FIB ADDL: CPT

## 2023-04-12 PROCEDURE — 93623 PRGRMD STIMJ&PACG IV RX NFS: CPT

## 2023-04-12 PROCEDURE — 6360000002 HC RX W HCPCS: Performed by: ANESTHESIOLOGY

## 2023-04-12 PROCEDURE — 3700000000 HC ANESTHESIA ATTENDED CARE

## 2023-04-12 PROCEDURE — C1730 CATH, EP, 19 OR FEW ELECT: HCPCS

## 2023-04-12 PROCEDURE — 93655 ICAR CATH ABLTJ DSCRT ARRHYT: CPT | Performed by: INTERNAL MEDICINE

## 2023-04-12 PROCEDURE — C1766 INTRO/SHEATH,STRBLE,NON-PEEL: HCPCS

## 2023-04-12 PROCEDURE — 85347 COAGULATION TIME ACTIVATED: CPT

## 2023-04-12 PROCEDURE — C1760 CLOSURE DEV, VASC: HCPCS

## 2023-04-12 PROCEDURE — C1759 CATH, INTRA ECHOCARDIOGRAPHY: HCPCS

## 2023-04-12 PROCEDURE — 2500000003 HC RX 250 WO HCPCS

## 2023-04-12 PROCEDURE — 93010 ELECTROCARDIOGRAM REPORT: CPT | Performed by: INTERNAL MEDICINE

## 2023-04-12 PROCEDURE — 93005 ELECTROCARDIOGRAM TRACING: CPT | Performed by: INTERNAL MEDICINE

## 2023-04-12 PROCEDURE — 2709999900 HC NON-CHARGEABLE SUPPLY

## 2023-04-12 PROCEDURE — 85610 PROTHROMBIN TIME: CPT

## 2023-04-12 PROCEDURE — 3700000001 HC ADD 15 MINUTES (ANESTHESIA)

## 2023-04-12 PROCEDURE — G0269 OCCLUSIVE DEVICE IN VEIN ART: HCPCS

## 2023-04-12 PROCEDURE — 93622 COMP EP EVAL L VENTR PAC&REC: CPT | Performed by: INTERNAL MEDICINE

## 2023-04-12 PROCEDURE — 7100000000 HC PACU RECOVERY - FIRST 15 MIN

## 2023-04-12 PROCEDURE — 93657 TX L/R ATRIAL FIB ADDL: CPT | Performed by: INTERNAL MEDICINE

## 2023-04-12 PROCEDURE — 6360000002 HC RX W HCPCS

## 2023-04-12 PROCEDURE — 93656 COMPRE EP EVAL ABLTJ ATR FIB: CPT

## 2023-04-12 PROCEDURE — 93656 COMPRE EP EVAL ABLTJ ATR FIB: CPT | Performed by: INTERNAL MEDICINE

## 2023-04-12 PROCEDURE — 93655 ICAR CATH ABLTJ DSCRT ARRHYT: CPT

## 2023-04-12 RX ORDER — FENTANYL CITRATE 50 UG/ML
50 INJECTION, SOLUTION INTRAMUSCULAR; INTRAVENOUS EVERY 5 MIN PRN
Status: DISCONTINUED | OUTPATIENT
Start: 2023-04-12 | End: 2023-04-17 | Stop reason: HOSPADM

## 2023-04-12 RX ORDER — ONDANSETRON 2 MG/ML
4 INJECTION INTRAMUSCULAR; INTRAVENOUS
Status: DISCONTINUED | OUTPATIENT
Start: 2023-04-12 | End: 2023-04-13 | Stop reason: HOSPADM

## 2023-04-12 RX ORDER — MIDAZOLAM HYDROCHLORIDE 1 MG/ML
2 INJECTION INTRAMUSCULAR; INTRAVENOUS
Status: DISCONTINUED | OUTPATIENT
Start: 2023-04-12 | End: 2023-04-13 | Stop reason: HOSPADM

## 2023-04-12 RX ORDER — SODIUM CHLORIDE 0.9 % (FLUSH) 0.9 %
5-40 SYRINGE (ML) INJECTION EVERY 12 HOURS SCHEDULED
Status: DISCONTINUED | OUTPATIENT
Start: 2023-04-12 | End: 2023-04-17 | Stop reason: HOSPADM

## 2023-04-12 RX ORDER — FUROSEMIDE 20 MG/1
20 TABLET ORAL DAILY
Qty: 3 TABLET | Refills: 0 | Status: SHIPPED | OUTPATIENT
Start: 2023-04-12

## 2023-04-12 RX ORDER — IPRATROPIUM BROMIDE AND ALBUTEROL SULFATE 2.5; .5 MG/3ML; MG/3ML
1 SOLUTION RESPIRATORY (INHALATION)
Status: DISCONTINUED | OUTPATIENT
Start: 2023-04-12 | End: 2023-04-13 | Stop reason: HOSPADM

## 2023-04-12 RX ORDER — PANTOPRAZOLE SODIUM 40 MG/1
40 TABLET, DELAYED RELEASE ORAL DAILY
Qty: 30 TABLET | Refills: 0 | Status: SHIPPED | OUTPATIENT
Start: 2023-04-12

## 2023-04-12 RX ORDER — SODIUM CHLORIDE 9 MG/ML
25 INJECTION, SOLUTION INTRAVENOUS PRN
Status: DISCONTINUED | OUTPATIENT
Start: 2023-04-12 | End: 2023-04-17 | Stop reason: HOSPADM

## 2023-04-12 RX ORDER — SODIUM CHLORIDE 9 MG/ML
INJECTION, SOLUTION INTRAVENOUS PRN
Status: DISCONTINUED | OUTPATIENT
Start: 2023-04-12 | End: 2023-04-17 | Stop reason: HOSPADM

## 2023-04-12 RX ORDER — SODIUM CHLORIDE 0.9 % (FLUSH) 0.9 %
5-40 SYRINGE (ML) INJECTION PRN
Status: DISCONTINUED | OUTPATIENT
Start: 2023-04-12 | End: 2023-04-17 | Stop reason: HOSPADM

## 2023-04-12 RX ORDER — METOCLOPRAMIDE HYDROCHLORIDE 5 MG/ML
10 INJECTION INTRAMUSCULAR; INTRAVENOUS
Status: DISCONTINUED | OUTPATIENT
Start: 2023-04-12 | End: 2023-04-13 | Stop reason: HOSPADM

## 2023-04-12 RX ORDER — LABETALOL HYDROCHLORIDE 5 MG/ML
10 INJECTION, SOLUTION INTRAVENOUS
Status: DISCONTINUED | OUTPATIENT
Start: 2023-04-12 | End: 2023-04-17 | Stop reason: HOSPADM

## 2023-04-12 RX ORDER — ACETAMINOPHEN 325 MG/1
650 TABLET ORAL EVERY 4 HOURS PRN
Status: DISCONTINUED | OUTPATIENT
Start: 2023-04-12 | End: 2023-04-17 | Stop reason: HOSPADM

## 2023-04-12 RX ADMIN — HYDROMORPHONE HYDROCHLORIDE 0.25 MG: 1 INJECTION, SOLUTION INTRAMUSCULAR; INTRAVENOUS; SUBCUTANEOUS at 13:13

## 2023-04-12 ASSESSMENT — PAIN - FUNCTIONAL ASSESSMENT: PAIN_FUNCTIONAL_ASSESSMENT: PREVENTS OR INTERFERES SOME ACTIVE ACTIVITIES AND ADLS

## 2023-04-12 ASSESSMENT — PAIN DESCRIPTION - DESCRIPTORS: DESCRIPTORS: SORE

## 2023-04-12 ASSESSMENT — PAIN SCALES - GENERAL: PAINLEVEL_OUTOF10: 6

## 2023-04-12 ASSESSMENT — PAIN DESCRIPTION - ONSET: ONSET: ON-GOING

## 2023-04-12 ASSESSMENT — PAIN DESCRIPTION - LOCATION: LOCATION: GROIN

## 2023-04-12 ASSESSMENT — PAIN DESCRIPTION - FREQUENCY: FREQUENCY: CONTINUOUS

## 2023-04-12 ASSESSMENT — PAIN DESCRIPTION - PAIN TYPE: TYPE: ACUTE PAIN;SURGICAL PAIN

## 2023-04-12 ASSESSMENT — PAIN DESCRIPTION - ORIENTATION: ORIENTATION: RIGHT

## 2023-04-14 LAB — INR BLD: 1.2 (ref 0.88–1.12)

## 2023-04-24 ENCOUNTER — OFFICE VISIT (OUTPATIENT)
Dept: CARDIOLOGY CLINIC | Age: 68
End: 2023-04-24
Payer: COMMERCIAL

## 2023-04-24 VITALS
HEART RATE: 89 BPM | DIASTOLIC BLOOD PRESSURE: 80 MMHG | WEIGHT: 272 LBS | BODY MASS INDEX: 37.94 KG/M2 | SYSTOLIC BLOOD PRESSURE: 116 MMHG

## 2023-04-24 DIAGNOSIS — Z51.81 ENCOUNTER FOR MONITORING FLECAINIDE THERAPY: ICD-10-CM

## 2023-04-24 DIAGNOSIS — Z79.899 ENCOUNTER FOR MONITORING FLECAINIDE THERAPY: ICD-10-CM

## 2023-04-24 DIAGNOSIS — I48.4 ATYPICAL ATRIAL FLUTTER (HCC): ICD-10-CM

## 2023-04-24 DIAGNOSIS — I48.3 TYPICAL ATRIAL FLUTTER (HCC): ICD-10-CM

## 2023-04-24 DIAGNOSIS — I48.0 PAROXYSMAL ATRIAL FIBRILLATION (HCC): Primary | ICD-10-CM

## 2023-04-24 DIAGNOSIS — Z79.01 ON CONTINUOUS ORAL ANTICOAGULATION: ICD-10-CM

## 2023-04-24 PROCEDURE — 99214 OFFICE O/P EST MOD 30 MIN: CPT | Performed by: NURSE PRACTITIONER

## 2023-04-24 PROCEDURE — 93000 ELECTROCARDIOGRAM COMPLETE: CPT | Performed by: NURSE PRACTITIONER

## 2023-04-24 PROCEDURE — 1123F ACP DISCUSS/DSCN MKR DOCD: CPT | Performed by: NURSE PRACTITIONER

## 2023-05-08 RX ORDER — PANTOPRAZOLE SODIUM 40 MG/1
TABLET, DELAYED RELEASE ORAL
Qty: 30 TABLET | Refills: 0 | OUTPATIENT
Start: 2023-05-08

## 2023-05-18 ENCOUNTER — OFFICE VISIT (OUTPATIENT)
Dept: CARDIOLOGY CLINIC | Age: 68
End: 2023-05-18
Payer: COMMERCIAL

## 2023-05-18 VITALS
WEIGHT: 270 LBS | DIASTOLIC BLOOD PRESSURE: 84 MMHG | SYSTOLIC BLOOD PRESSURE: 110 MMHG | BODY MASS INDEX: 37.66 KG/M2 | HEART RATE: 94 BPM

## 2023-05-18 DIAGNOSIS — I48.4 ATYPICAL ATRIAL FLUTTER (HCC): ICD-10-CM

## 2023-05-18 DIAGNOSIS — I25.10 CORONARY ARTERY DISEASE INVOLVING NATIVE HEART, UNSPECIFIED VESSEL OR LESION TYPE, UNSPECIFIED WHETHER ANGINA PRESENT: ICD-10-CM

## 2023-05-18 DIAGNOSIS — I48.3 TYPICAL ATRIAL FLUTTER (HCC): ICD-10-CM

## 2023-05-18 DIAGNOSIS — I48.19 PERSISTENT ATRIAL FIBRILLATION (HCC): Primary | ICD-10-CM

## 2023-05-18 PROCEDURE — 93000 ELECTROCARDIOGRAM COMPLETE: CPT | Performed by: INTERNAL MEDICINE

## 2023-05-18 PROCEDURE — 99214 OFFICE O/P EST MOD 30 MIN: CPT | Performed by: INTERNAL MEDICINE

## 2023-05-18 PROCEDURE — 1123F ACP DISCUSS/DSCN MKR DOCD: CPT | Performed by: INTERNAL MEDICINE

## 2023-06-23 ENCOUNTER — OFFICE VISIT (OUTPATIENT)
Dept: FAMILY MEDICINE CLINIC | Age: 68
End: 2023-06-23
Payer: COMMERCIAL

## 2023-06-23 VITALS
DIASTOLIC BLOOD PRESSURE: 80 MMHG | OXYGEN SATURATION: 96 % | HEART RATE: 80 BPM | SYSTOLIC BLOOD PRESSURE: 120 MMHG | WEIGHT: 269.6 LBS | TEMPERATURE: 96.8 F | BODY MASS INDEX: 37.6 KG/M2

## 2023-06-23 DIAGNOSIS — R39.11 BENIGN PROSTATIC HYPERPLASIA WITH URINARY HESITANCY: ICD-10-CM

## 2023-06-23 DIAGNOSIS — R30.0 DYSURIA: Primary | ICD-10-CM

## 2023-06-23 DIAGNOSIS — N40.1 BENIGN PROSTATIC HYPERPLASIA WITH URINARY HESITANCY: ICD-10-CM

## 2023-06-23 DIAGNOSIS — R73.03 PRE-DIABETES: ICD-10-CM

## 2023-06-23 DIAGNOSIS — N39.41 URGE INCONTINENCE OF URINE: ICD-10-CM

## 2023-06-23 LAB
BILIRUBIN, POC: NEGATIVE
BLOOD URINE, POC: NEGATIVE
CLARITY, POC: CLEAR
COLOR, POC: NORMAL
GLUCOSE URINE, POC: NEGATIVE
KETONES, POC: NEGATIVE
LEUKOCYTE EST, POC: NEGATIVE
NITRITE, POC: NEGATIVE
PH, POC: 6
PROTEIN, POC: NEGATIVE
SPECIFIC GRAVITY, POC: <=1.005
UROBILINOGEN, POC: NORMAL

## 2023-06-23 PROCEDURE — 81002 URINALYSIS NONAUTO W/O SCOPE: CPT | Performed by: FAMILY MEDICINE

## 2023-06-23 PROCEDURE — 1123F ACP DISCUSS/DSCN MKR DOCD: CPT | Performed by: FAMILY MEDICINE

## 2023-06-23 PROCEDURE — 99214 OFFICE O/P EST MOD 30 MIN: CPT | Performed by: FAMILY MEDICINE

## 2023-06-23 RX ORDER — TAMSULOSIN HYDROCHLORIDE 0.4 MG/1
0.8 CAPSULE ORAL DAILY
Qty: 180 CAPSULE | Refills: 1 | Status: SHIPPED | OUTPATIENT
Start: 2023-06-23

## 2023-06-23 RX ORDER — OXYBUTYNIN CHLORIDE 5 MG/1
5 TABLET, EXTENDED RELEASE ORAL DAILY
Qty: 90 TABLET | Refills: 1 | Status: SHIPPED | OUTPATIENT
Start: 2023-06-23

## 2023-06-23 NOTE — PROGRESS NOTES
SYSTEMS:   CONSTITUTIONAL: No weight loss, fever, chills, weakness or fatigue. HEENT: Eyes: No visual loss, blurred vision, double vision or yellow sclerae. Ears, Nose, Throat: No hearing loss, sneezing, congestion, runny nose or sore throat. SKIN: No rash or itching. CARDIOVASCULAR: No chest pain, chest pressure or chest discomfort. No palpitations. Complains of edema. RESPIRATORY: No shortness of breath, cough or sputum. GASTROINTESTINAL: No anorexia, nausea, vomiting, diarrhea or constipation. No abdominal pain, hematochezia or melena. GENITOURINARY:No frequency, hematuria. Complains of dysuria, urgency and incontinence. NEUROLOGICAL: No headache, dizziness, syncope, paralysis, ataxia,   Complains of numbness or tingling in the left upper extremities. No change in bowel or bladder control. MUSCULOSKELETAL: No muscle pain, back pain, joint pain or stiffness. PSYCHIATRIC: No history of depression or anxiety. ENDOCRINOLOGIC: No reports of sweating, cold or heat intolerance. No polyuria or polydipsia. Objective     . /80   Pulse 80   Temp 96.8 °F (36 °C)   Wt 269 lb 9.6 oz (122.3 kg)   SpO2 96%   BMI 37.60 kg/m²     GENERAL:  Dorman Bumpers is a 79 y.o.  male who is not in any acute distress. He was well attired and well groomed and was speaking in full sentences. LUNGS:  Normal ventilatory breath sounds are heard bilaterally. No crackles   or wheezes heard. CARDIOVASCULAR:  Normal S1, S2 heard. No murmurs heard. No JVD. GASTROINTESTINAL:  Abdomen soft, nontender. No guarding or rigidity noted. No organomegaly noted. Normal bowel sounds were heard. EXTREMITIES:  All 4 extremities were moving fine. Full range of motion is   present. No deformity noted. Peripheral pulses were felt. Bilateral trace lower   extremity edema. Neurovascular integrity maintained.     Assessment/Plan     Diagnoses and all orders for this visit:    Dysuria  -     POCT Urinalysis no

## 2023-06-26 DIAGNOSIS — R73.03 PRE-DIABETES: ICD-10-CM

## 2023-06-26 DIAGNOSIS — I48.21 PERMANENT ATRIAL FIBRILLATION (HCC): ICD-10-CM

## 2023-06-26 DIAGNOSIS — I25.10 CORONARY ARTERY DISEASE INVOLVING NATIVE HEART, UNSPECIFIED VESSEL OR LESION TYPE, UNSPECIFIED WHETHER ANGINA PRESENT: ICD-10-CM

## 2023-06-26 LAB
ALBUMIN SERPL-MCNC: 3.8 G/DL (ref 3.4–5)
ALBUMIN/GLOB SERPL: 1.5 {RATIO} (ref 1.1–2.2)
ALP SERPL-CCNC: 88 U/L (ref 40–129)
ALT SERPL-CCNC: 7 U/L (ref 10–40)
ANION GAP SERPL CALCULATED.3IONS-SCNC: 8 MMOL/L (ref 3–16)
AST SERPL-CCNC: 10 U/L (ref 15–37)
BASOPHILS # BLD: 0.1 K/UL (ref 0–0.2)
BASOPHILS NFR BLD: 0.8 %
BILIRUB SERPL-MCNC: 0.3 MG/DL (ref 0–1)
BUN SERPL-MCNC: 18 MG/DL (ref 7–20)
CALCIUM SERPL-MCNC: 8.6 MG/DL (ref 8.3–10.6)
CHLORIDE SERPL-SCNC: 102 MMOL/L (ref 99–110)
CHOLEST SERPL-MCNC: 122 MG/DL (ref 0–199)
CO2 SERPL-SCNC: 26 MMOL/L (ref 21–32)
CREAT SERPL-MCNC: 0.9 MG/DL (ref 0.8–1.3)
DEPRECATED RDW RBC AUTO: 15 % (ref 12.4–15.4)
EOSINOPHIL # BLD: 0.2 K/UL (ref 0–0.6)
EOSINOPHIL NFR BLD: 2.1 %
GFR SERPLBLD CREATININE-BSD FMLA CKD-EPI: >60 ML/MIN/{1.73_M2}
GLUCOSE SERPL-MCNC: 123 MG/DL (ref 70–99)
HCT VFR BLD AUTO: 30.6 % (ref 40.5–52.5)
HDLC SERPL-MCNC: 36 MG/DL (ref 40–60)
HGB BLD-MCNC: 10.3 G/DL (ref 13.5–17.5)
LDLC SERPL CALC-MCNC: 46 MG/DL
LYMPHOCYTES # BLD: 1.5 K/UL (ref 1–5.1)
LYMPHOCYTES NFR BLD: 18.8 %
MCH RBC QN AUTO: 27.9 PG (ref 26–34)
MCHC RBC AUTO-ENTMCNC: 33.7 G/DL (ref 31–36)
MCV RBC AUTO: 82.8 FL (ref 80–100)
MONOCYTES # BLD: 0.7 K/UL (ref 0–1.3)
MONOCYTES NFR BLD: 8.7 %
NEUTROPHILS # BLD: 5.5 K/UL (ref 1.7–7.7)
NEUTROPHILS NFR BLD: 69.6 %
PLATELET # BLD AUTO: 270 K/UL (ref 135–450)
PMV BLD AUTO: 8.7 FL (ref 5–10.5)
POTASSIUM SERPL-SCNC: 4.2 MMOL/L (ref 3.5–5.1)
PROT SERPL-MCNC: 6.3 G/DL (ref 6.4–8.2)
RBC # BLD AUTO: 3.7 M/UL (ref 4.2–5.9)
SODIUM SERPL-SCNC: 136 MMOL/L (ref 136–145)
TRIGL SERPL-MCNC: 201 MG/DL (ref 0–150)
VLDLC SERPL CALC-MCNC: 40 MG/DL
WBC # BLD AUTO: 7.9 K/UL (ref 4–11)

## 2023-06-27 LAB
EST. AVERAGE GLUCOSE BLD GHB EST-MCNC: 119.8 MG/DL
HBA1C MFR BLD: 5.8 %

## 2023-06-29 ENCOUNTER — TELEPHONE (OUTPATIENT)
Dept: FAMILY MEDICINE CLINIC | Age: 68
End: 2023-06-29

## 2023-06-29 ENCOUNTER — TELEPHONE (OUTPATIENT)
Dept: CARDIOLOGY CLINIC | Age: 68
End: 2023-06-29

## 2023-06-29 ENCOUNTER — OFFICE VISIT (OUTPATIENT)
Dept: CARDIOLOGY CLINIC | Age: 68
End: 2023-06-29
Payer: COMMERCIAL

## 2023-06-29 VITALS
DIASTOLIC BLOOD PRESSURE: 68 MMHG | HEART RATE: 90 BPM | SYSTOLIC BLOOD PRESSURE: 92 MMHG | WEIGHT: 263 LBS | BODY MASS INDEX: 36.68 KG/M2

## 2023-06-29 DIAGNOSIS — Z79.01 ON CONTINUOUS ORAL ANTICOAGULATION: ICD-10-CM

## 2023-06-29 DIAGNOSIS — N20.0 NEPHROLITHIASIS: ICD-10-CM

## 2023-06-29 DIAGNOSIS — Z79.899 ENCOUNTER FOR MONITORING FLECAINIDE THERAPY: ICD-10-CM

## 2023-06-29 DIAGNOSIS — D64.9 ANEMIA, UNSPECIFIED TYPE: Primary | ICD-10-CM

## 2023-06-29 DIAGNOSIS — I48.21 PERMANENT ATRIAL FIBRILLATION (HCC): Primary | ICD-10-CM

## 2023-06-29 DIAGNOSIS — N20.0 NEPHROLITHIASIS: Primary | ICD-10-CM

## 2023-06-29 DIAGNOSIS — R30.0 DYSURIA: Primary | ICD-10-CM

## 2023-06-29 DIAGNOSIS — I48.0 PAROXYSMAL ATRIAL FIBRILLATION (HCC): ICD-10-CM

## 2023-06-29 DIAGNOSIS — R06.02 SOB (SHORTNESS OF BREATH): ICD-10-CM

## 2023-06-29 DIAGNOSIS — Z51.81 ENCOUNTER FOR MONITORING FLECAINIDE THERAPY: ICD-10-CM

## 2023-06-29 DIAGNOSIS — R53.83 FATIGUE, UNSPECIFIED TYPE: ICD-10-CM

## 2023-06-29 PROCEDURE — 93242 EXT ECG>48HR<7D RECORDING: CPT | Performed by: INTERNAL MEDICINE

## 2023-06-29 PROCEDURE — 93000 ELECTROCARDIOGRAM COMPLETE: CPT | Performed by: NURSE PRACTITIONER

## 2023-06-29 PROCEDURE — 99215 OFFICE O/P EST HI 40 MIN: CPT | Performed by: NURSE PRACTITIONER

## 2023-06-29 PROCEDURE — 1123F ACP DISCUSS/DSCN MKR DOCD: CPT | Performed by: NURSE PRACTITIONER

## 2023-06-29 RX ORDER — SULFAMETHOXAZOLE AND TRIMETHOPRIM 800; 160 MG/1; MG/1
1 TABLET ORAL 2 TIMES DAILY
Qty: 14 TABLET | Refills: 0 | Status: SHIPPED | OUTPATIENT
Start: 2023-06-29 | End: 2023-07-06

## 2023-06-29 RX ORDER — CIPROFLOXACIN 500 MG/1
500 TABLET, FILM COATED ORAL 2 TIMES DAILY
Qty: 10 TABLET | Refills: 0 | Status: SHIPPED | OUTPATIENT
Start: 2023-06-29 | End: 2023-07-04

## 2023-06-29 RX ORDER — FLECAINIDE ACETATE 100 MG/1
50 TABLET ORAL 2 TIMES DAILY
Qty: 60 TABLET | Refills: 5 | Status: SHIPPED | OUTPATIENT
Start: 2023-06-29

## 2023-06-29 NOTE — TELEPHONE ENCOUNTER
Pharmacy called-- there is an interaction between the flecainide and cipro, both of which were prescribed today. The interaction is that it may increase risk of arrhythmia. Please advise if you would like to make any changes to treatment because of thsi.

## 2023-06-30 ENCOUNTER — HOSPITAL ENCOUNTER (OUTPATIENT)
Dept: ULTRASOUND IMAGING | Age: 68
Discharge: HOME | End: 2023-06-30
Payer: COMMERCIAL

## 2023-06-30 DIAGNOSIS — N20.0 NEPHROLITHIASIS: ICD-10-CM

## 2023-06-30 PROCEDURE — 76770 US EXAM ABDO BACK WALL COMP: CPT

## 2023-07-05 ENCOUNTER — TELEPHONE (OUTPATIENT)
Dept: FAMILY MEDICINE CLINIC | Age: 68
End: 2023-07-05

## 2023-07-05 NOTE — TELEPHONE ENCOUNTER
Spoke with pt. Pt stated that the urinary bleeding has stopped but he is now having rectal bleeding. Informed PCP. PCP advising pt to call Cardiology and let them know of the bleeding episodes since pt is on a blood thinner and then PCP is recommending pt be seen by GI for further evaluation and treatment. Pt in agreement. Please place appropriate referrals at this time.

## 2023-07-05 NOTE — TELEPHONE ENCOUNTER
Pt is asking if Key could give him a call back since he has some new symptoms that needs to be discussed with Dr. Nahum Aburto

## 2023-07-06 ENCOUNTER — TELEPHONE (OUTPATIENT)
Dept: CARDIOLOGY CLINIC | Age: 68
End: 2023-07-06

## 2023-07-06 ENCOUNTER — NURSE ONLY (OUTPATIENT)
Dept: CARDIOLOGY CLINIC | Age: 68
End: 2023-07-06
Payer: COMMERCIAL

## 2023-07-06 DIAGNOSIS — I48.21 PERMANENT ATRIAL FIBRILLATION (HCC): Primary | ICD-10-CM

## 2023-07-06 PROCEDURE — 93000 ELECTROCARDIOGRAM COMPLETE: CPT | Performed by: INTERNAL MEDICINE

## 2023-07-06 NOTE — PATIENT INSTRUCTIONS
Transesophageal Echocardiogram (KEON) with External Cardioversion    Date of Procedure:     Time of Arrival:     Cardiologist performing procedure:     Arrive at [facility] through the main entrance. Check in at the Outpatient Diagnostic desk on the 1st floor. Do not eat or drink anything after midnight the night before the procedure. You may brush your teeth and rinse the morning of the procedure. Take ALL of your routine medications the morning of the procedure. However, if you are taking diabetic medications, please HOLD on the day of the procedure (including insulin). If you take Lantus insulin, take HALF of your usual dose the night before. Do NOT stop taking Eliquis (any blood thinners)    Please bring a list of your medications to the hospital with you. Do not apply any lotion, powder, or deodorant the morning of the procedure. You must have someone available to drive you home. It is recommended that you do not drive for 24 hours after the procedure. If you are unable to make this appointment, please call [address] [facility] at [phone].

## 2023-07-06 NOTE — TELEPHONE ENCOUNTER
2 day CAM showed 100% AFL. Per FW, schedule pt for DCCV. Pt completed the monitor on 7/1 and reports that he went back into normal rhythm as of yesterday (per Grei washburn). Asked pt to come in for an ECG this afternoon. Of note, pt stopped Xarelto on Monday d/t bright red blood around stool. Pt has hemorrhoids which have bled in the past. On his own, he stopped the Xarelto on 7/3 and has not resumed. He denies any further bleeding as of this morning.

## 2023-07-06 NOTE — TELEPHONE ENCOUNTER
Reviewed ECG with FW. Pt is still in AF/AFL. Switch from Xarelto to Eliquis 5 mg BID, per FW. Pt to start today. Schedule pt for KEON/DCCV with FL next week. Reviewed instructions with pt, verbalized understanding. EP form started. Scheduled f/u with FW on 8/7/23    Transesophageal Echocardiogram (KEON) with External Cardioversion     Date of Procedure:      Time of Arrival:      Cardiologist performing procedure: Dr. Durham at [facility] through the main entrance. Check in at the Outpatient Diagnostic desk on the 1st floor. Do not eat or drink anything after midnight the night before the procedure. You may brush your teeth and rinse the morning of the procedure. Take ALL of your routine medications the morning of the procedure. However, if you are taking diabetic medications, please HOLD on the day of the procedure (including insulin). If you take Lantus insulin, take HALF of your usual dose the night before. Do NOT stop taking Eliquis (any blood thinners)     Please bring a list of your medications to the hospital with you. Do not apply any lotion, powder, or deodorant the morning of the procedure. You must have someone available to drive you home. It is recommended that you do not drive for 24 hours after the procedure. If you are unable to make this appointment, please call [facility] [address] [facility] at [phone].

## 2023-07-07 DIAGNOSIS — K92.1 HEMATOCHEZIA: Primary | ICD-10-CM

## 2023-07-07 PROCEDURE — 93244 EXT ECG>48HR<7D REV&INTERPJ: CPT | Performed by: INTERNAL MEDICINE

## 2023-07-07 NOTE — TELEPHONE ENCOUNTER
Transesophageal Echocardiogram (KEON) with External Cardioversion     Date of Procedure: 7/13/2023     Time of Arrival: 8:00 am   Procedure time: 9:00 am      Cardiologist performing procedure: Dr. Jude Newell at [facility] through the main entrance. Check in at the Outpatient Diagnostic desk on the 1st floor. Do not eat or drink anything after midnight the night before the procedure. You may brush your teeth and rinse the morning of the procedure. Take ALL of your routine medications the morning of the procedure. However, if you are taking diabetic medications, please HOLD on the day of the procedure (including insulin). If you take Lantus insulin, take HALF of your usual dose the night before. Do NOT stop taking Eliquis (any blood thinners)     Please bring a list of your medications to the hospital with you. Do not apply any lotion, powder, or deodorant the morning of the procedure. You must have someone available to drive you home. It is recommended that you do not drive for 24 hours after the procedure. If you are unable to make this appointment, please call [facility] [address] [facility] at [phone].      Qgeneda updated / alerted cath lab [facility])

## 2023-07-07 NOTE — TELEPHONE ENCOUNTER
Spoke with pt. [employer] message sent with referral information per pt request.     No further questions at this time.

## 2023-07-07 NOTE — TELEPHONE ENCOUNTER
Please advise the patient that I have sent a referral for him.   Please provide him contact information to make an appointment

## 2023-07-10 DIAGNOSIS — I48.0 PAROXYSMAL ATRIAL FIBRILLATION (HCC): Primary | ICD-10-CM

## 2023-07-12 NOTE — PRE-PROCEDURE INSTRUCTIONS
Called patient about procedure. Told to be here at 0730 for procedure at 0900. NPO after midnight, but can take morning medication with sips of water, patient stated they are on Eliquis. To have a responsible adult be with patient take them home and stay with them afterwards, if they do not get admitted to [facility]. And if available bring current list of medications. No other questions or concerns.

## 2023-07-13 ENCOUNTER — ANESTHESIA (OUTPATIENT)
Dept: CARDIAC CATH/INVASIVE PROCEDURES | Age: 68
End: 2023-07-13

## 2023-07-13 ENCOUNTER — ANESTHESIA EVENT (OUTPATIENT)
Dept: CARDIAC CATH/INVASIVE PROCEDURES | Age: 68
End: 2023-07-13

## 2023-07-13 ENCOUNTER — HOSPITAL ENCOUNTER (OUTPATIENT)
Dept: CARDIAC CATH/INVASIVE PROCEDURES | Age: 68
Discharge: HOME | End: 2023-07-15
Payer: COMMERCIAL

## 2023-07-13 VITALS
HEART RATE: 91 BPM | BODY MASS INDEX: 36.54 KG/M2 | OXYGEN SATURATION: 97 % | HEIGHT: 71 IN | RESPIRATION RATE: 16 BRPM | WEIGHT: 261 LBS | SYSTOLIC BLOOD PRESSURE: 116 MMHG | DIASTOLIC BLOOD PRESSURE: 75 MMHG | TEMPERATURE: 97.7 F

## 2023-07-13 PROBLEM — I48.0 PAROXYSMAL ATRIAL FIBRILLATION (HCC): Status: ACTIVE | Noted: 2023-07-13

## 2023-07-13 LAB
ALBUMIN SERPL-MCNC: 3.8 G/DL (ref 3.4–5)
ALBUMIN/GLOB SERPL: 1.2 {RATIO} (ref 1.1–2.2)
ALP SERPL-CCNC: 77 U/L (ref 40–129)
ALT SERPL-CCNC: 11 U/L (ref 10–40)
ANION GAP SERPL CALCULATED.3IONS-SCNC: 8 MMOL/L (ref 3–16)
AST SERPL-CCNC: 12 U/L (ref 15–37)
BILIRUB SERPL-MCNC: 0.4 MG/DL (ref 0–1)
BUN SERPL-MCNC: 23 MG/DL (ref 7–20)
CALCIUM SERPL-MCNC: 8.8 MG/DL (ref 8.3–10.6)
CHLORIDE SERPL-SCNC: 106 MMOL/L (ref 99–110)
CO2 SERPL-SCNC: 28 MMOL/L (ref 21–32)
CREAT SERPL-MCNC: 0.9 MG/DL (ref 0.8–1.3)
EKG ATRIAL RATE: 220 BPM
EKG DIAGNOSIS: NORMAL
EKG Q-T INTERVAL: 402 MS
EKG QRS DURATION: 100 MS
EKG QTC CALCULATION (BAZETT): 486 MS
EKG R AXIS: 46 DEGREES
EKG T AXIS: 60 DEGREES
EKG VENTRICULAR RATE: 88 BPM
GFR SERPLBLD CREATININE-BSD FMLA CKD-EPI: >60 ML/MIN/{1.73_M2}
GLUCOSE SERPL-MCNC: 123 MG/DL (ref 70–99)
INR PPP: 1.28 (ref 0.84–1.16)
NT-PROBNP SERPL-MCNC: 173 PG/ML (ref 0–124)
POTASSIUM SERPL-SCNC: 4.4 MMOL/L (ref 3.5–5.1)
PROT SERPL-MCNC: 7 G/DL (ref 6.4–8.2)
PROTHROMBIN TIME: 15.9 SEC (ref 11.5–14.8)
SARS-COV-2 RDRP RESP QL NAA+PROBE: NOT DETECTED
SODIUM SERPL-SCNC: 142 MMOL/L (ref 136–145)

## 2023-07-13 PROCEDURE — 3700000001 HC ADD 15 MINUTES (ANESTHESIA)

## 2023-07-13 PROCEDURE — 83880 ASSAY OF NATRIURETIC PEPTIDE: CPT

## 2023-07-13 PROCEDURE — 92960 CARDIOVERSION ELECTRIC EXT: CPT | Performed by: INTERNAL MEDICINE

## 2023-07-13 PROCEDURE — 92960 CARDIOVERSION ELECTRIC EXT: CPT

## 2023-07-13 PROCEDURE — 93325 DOPPLER ECHO COLOR FLOW MAPG: CPT

## 2023-07-13 PROCEDURE — 85610 PROTHROMBIN TIME: CPT

## 2023-07-13 PROCEDURE — 93320 DOPPLER ECHO COMPLETE: CPT

## 2023-07-13 PROCEDURE — 93312 ECHO TRANSESOPHAGEAL: CPT | Performed by: INTERNAL MEDICINE

## 2023-07-13 PROCEDURE — 93312 ECHO TRANSESOPHAGEAL: CPT

## 2023-07-13 PROCEDURE — 93005 ELECTROCARDIOGRAM TRACING: CPT | Performed by: INTERNAL MEDICINE

## 2023-07-13 PROCEDURE — 3700000000 HC ANESTHESIA ATTENDED CARE

## 2023-07-13 PROCEDURE — 80053 COMPREHEN METABOLIC PANEL: CPT

## 2023-07-13 PROCEDURE — 93010 ELECTROCARDIOGRAM REPORT: CPT | Performed by: INTERNAL MEDICINE

## 2023-07-13 PROCEDURE — 87635 SARS-COV-2 COVID-19 AMP PRB: CPT

## 2023-07-13 RX ORDER — SODIUM CHLORIDE, SODIUM LACTATE, POTASSIUM CHLORIDE, CALCIUM CHLORIDE 600; 310; 30; 20 MG/100ML; MG/100ML; MG/100ML; MG/100ML
INJECTION, SOLUTION INTRAVENOUS CONTINUOUS PRN
Status: DISCONTINUED | OUTPATIENT
Start: 2023-07-13 | End: 2023-07-13 | Stop reason: SDUPTHER

## 2023-07-13 RX ORDER — PROPOFOL 10 MG/ML
INJECTION, EMULSION INTRAVENOUS PRN
Status: DISCONTINUED | OUTPATIENT
Start: 2023-07-13 | End: 2023-07-13 | Stop reason: SDUPTHER

## 2023-07-13 RX ORDER — LIDOCAINE HCL/PF 100 MG/5ML
SYRINGE (ML) INJECTION PRN
Status: DISCONTINUED | OUTPATIENT
Start: 2023-07-13 | End: 2023-07-13 | Stop reason: SDUPTHER

## 2023-07-13 RX ORDER — KETOROLAC TROMETHAMINE 30 MG/ML
INJECTION, SOLUTION INTRAMUSCULAR; INTRAVENOUS PRN
Status: DISCONTINUED | OUTPATIENT
Start: 2023-07-13 | End: 2023-07-13 | Stop reason: SDUPTHER

## 2023-07-13 RX ADMIN — SODIUM CHLORIDE, SODIUM LACTATE, POTASSIUM CHLORIDE, CALCIUM CHLORIDE: 600; 310; 30; 20 INJECTION, SOLUTION INTRAVENOUS at 09:10

## 2023-07-13 RX ADMIN — PROPOFOL 40 MG: 10 INJECTION, EMULSION INTRAVENOUS at 09:21

## 2023-07-13 RX ADMIN — Medication 100 MG: at 09:17

## 2023-07-13 RX ADMIN — PROPOFOL 40 MG: 10 INJECTION, EMULSION INTRAVENOUS at 09:24

## 2023-07-13 RX ADMIN — KETOROLAC TROMETHAMINE 30 MG: 30 INJECTION, SOLUTION INTRAMUSCULAR; INTRAVENOUS at 09:26

## 2023-07-13 RX ADMIN — PROPOFOL 100 MG: 10 INJECTION, EMULSION INTRAVENOUS at 09:17

## 2023-07-13 ASSESSMENT — LIFESTYLE VARIABLES: SMOKING_STATUS: 0

## 2023-07-13 ASSESSMENT — ENCOUNTER SYMPTOMS: DYSPNEA ACTIVITY LEVEL: AFTER AMBULATING 1 FLIGHT OF STAIRS

## 2023-07-13 NOTE — PROCEDURES
KEON CARDIOVERSION    PROCEDURE PERFORMED: KEON guided external electrical cardioversion. :      REFERRING PHYSICIAN:  EP service    COMPLICATIONS:  none    ESTIMATED BLOOD LOSS:  none    ANESTHESIA:  provided by anesthesia    OTHER MEDICATIONS:  None    HISTORY:      PROCEDURE IN DETAIL:  The patient was in stable condition. The patient was in a fasting, nonsedated state. He was hemodynamically stable. The risks, benefits and alternatives of both the transesophageal echocardiography and external electrical cardioversion were discussed with the patient in detail. The risks associated with the KEON, including but not limited to, the risks of oropharyngeal and esophageal injury, in addition to allergic reactions to the topical and systemic anesthetics used were all discussed with the patient. In regards to the external electrical cardioversion, the risks of asystole, stroke and skin burns were discussed with the patient. After considering the risks and benefits of both procedures, the patient opted to proceed with the KEON guided external electrical cardioversion. Written informed consent was obtained and placed on the chart. At this point, a timeout protocol was completed to identify the patient and the procedure being performed. The patient was attached to continuous electrocardiographic monitoring with noninvasive blood pressure monitoring as well. Anterior and posterior chest defibrillation pads were attached in the typical position. The patient underwent the KEON portion of the procedure, which is reported separately. Briefly, the patient had the KEON probe advanced into position without difficulty. He tolerated the procedure well. Multiplane imaging of the cardiac chambers were obtained and showed no evidence of intracardiac thrombus or any other contraindication to proceed with external electrical cardioversion.  The probe was removed without difficulty and we proceeded to prepare for the

## 2023-07-13 NOTE — ANESTHESIA POSTPROCEDURE EVALUATION
Department of Anesthesiology  Postprocedure Note    Patient: Ibis La  MRN: [id number]  YOB: 1955  Date of evaluation: 7/13/2023      Procedure Summary     Date: 07/13/23 Room / Location: [facility] Cath Lab    Anesthesia Start: 0910 Anesthesia Stop:     Procedure: [facility] CARDIO/KEON W ECHO AND ANES Diagnosis:     Scheduled Providers:  Responsible Provider: Osman Doyle DO    Anesthesia Type: MAC ASA Status: 3          Anesthesia Type: No value filed.     José Miguel Phase I:      José Miguel Phase II:        Anesthesia Post Evaluation    Patient location during evaluation: PACU  Patient participation: complete - patient participated  Level of consciousness: awake  Pain score: 0  Airway patency: patent  Nausea & Vomiting: no nausea and no vomiting  Complications: no  Cardiovascular status: hemodynamically stable  Respiratory status: acceptable  Hydration status: stable

## 2023-07-13 NOTE — DISCHARGE INSTRUCTIONS
[facility]  Cardiovascular Special Procedures  Transesophageal Echocardiogram  Patient Discharge Instructions      Rest for the remainder of the day. Your throat may be sore, this is common and will go away. If persistent soreness continues, call your physician. If you have any difficulty in swallowing or drinking/eating, call your physician. Resume normal activity tomorrow. [facility]  Cardiovascular Special Procedures  General Discharge Instructions    PROCEDURE: ____________________________________________________    ____ Krishan Amor may be drowsy or lightheaded after receiving sedation. DO NOT operate a vehicle (automobile, bicycle, motorcycle, machinery, or power tools), make any important decisions or sign any important/legal documents, or drink alcoholic beverages for the next 24 hours  ____ We strongly suggest that a responsible adult be with you for the next 24 hours for your protection and safety  ____ If the intravenous catheter site is painful, apply warm wet compresses on the site until the soreness is relieved and elevate the arm above the heart. Call your physician if no improvement in 2 to 3 days    DIETARY INSTRUCTIONS:    ____ Drink extra fluids over the next 24 hours (If not contraindicated by illness or by                   physician order)  ____ Start with clear liquids and progress to normal diet as you feel like eating.   If you experience nausea or repeated episodes of vomiting, which persist beyond 12-24 hours, notify your doctor        ____ Resume your previous diet      MEDICATION INSTRUCTIONS:    ____ See Medication Reconciliation Sheet      SPECIAL INSTRUCTIONS:  ________________________________________________________________________________________________________________________________________________________________________________________________________________________

## 2023-07-14 LAB
EKG ATRIAL RATE: 72 BPM
EKG DIAGNOSIS: NORMAL
EKG P AXIS: 87 DEGREES
EKG P-R INTERVAL: 224 MS
EKG Q-T INTERVAL: 440 MS
EKG QRS DURATION: 108 MS
EKG QTC CALCULATION (BAZETT): 481 MS
EKG R AXIS: 47 DEGREES
EKG T AXIS: 69 DEGREES
EKG VENTRICULAR RATE: 72 BPM

## 2023-07-20 ENCOUNTER — OFFICE VISIT (OUTPATIENT)
Dept: CARDIOLOGY CLINIC | Age: 68
End: 2023-07-20
Payer: COMMERCIAL

## 2023-07-20 VITALS
SYSTOLIC BLOOD PRESSURE: 126 MMHG | HEART RATE: 61 BPM | WEIGHT: 267 LBS | DIASTOLIC BLOOD PRESSURE: 70 MMHG | BODY MASS INDEX: 37.24 KG/M2

## 2023-07-20 DIAGNOSIS — Z79.899 ENCOUNTER FOR MONITORING FLECAINIDE THERAPY: ICD-10-CM

## 2023-07-20 DIAGNOSIS — Z79.01 ON CONTINUOUS ORAL ANTICOAGULATION: ICD-10-CM

## 2023-07-20 DIAGNOSIS — Z51.81 ENCOUNTER FOR MONITORING FLECAINIDE THERAPY: ICD-10-CM

## 2023-07-20 DIAGNOSIS — I48.3 TYPICAL ATRIAL FLUTTER (HCC): ICD-10-CM

## 2023-07-20 DIAGNOSIS — I48.0 PAROXYSMAL ATRIAL FIBRILLATION (HCC): Primary | ICD-10-CM

## 2023-07-20 DIAGNOSIS — I50.30 HEART FAILURE WITH PRESERVED EJECTION FRACTION, UNSPECIFIED HF CHRONICITY (HCC): ICD-10-CM

## 2023-07-20 PROCEDURE — 1123F ACP DISCUSS/DSCN MKR DOCD: CPT | Performed by: NURSE PRACTITIONER

## 2023-07-20 PROCEDURE — 99215 OFFICE O/P EST HI 40 MIN: CPT | Performed by: NURSE PRACTITIONER

## 2023-07-20 PROCEDURE — 93000 ELECTROCARDIOGRAM COMPLETE: CPT | Performed by: NURSE PRACTITIONER

## 2023-07-21 ENCOUNTER — TELEPHONE (OUTPATIENT)
Dept: CARDIOLOGY CLINIC | Age: 68
End: 2023-07-21

## 2023-07-21 NOTE — TELEPHONE ENCOUNTER
The patient called stating Meche Hernández NP prescribed 2 new medications for him, but they were not sent to his pharmacy.     According to Meche Hernández NP's note on 07/20/23     HFpEF  - EF 50-55%  - NYHA class II  - QRS II  - BNP elevated at 179  - Will add Jardiance 10 mg QD  - BMP, BNP in 1 week  - If renal function is good - will add low dose Entresto  at 1/2 dose d/t historically low BPs's - patient was given coupons today    Pharmacy    [facility] [address], [address] [place] [phone]   [address] [place] [address] [address]   Phone:  [phone]  Fax:  [phone]

## 2023-07-21 NOTE — TELEPHONE ENCOUNTER
Informed pt will send in script for Jardiance 10 mg QD and will verify with Leland Gold NP on Monday dose for [facility] and send it on Monday. Will call pt back on Monday. Pt verbalized understanding.

## 2023-07-24 RX ORDER — ATORVASTATIN CALCIUM 10 MG/1
TABLET, FILM COATED ORAL
Qty: 90 TABLET | Refills: 1 | Status: SHIPPED | OUTPATIENT
Start: 2023-07-24

## 2023-07-24 NOTE — TELEPHONE ENCOUNTER
Informed pt Azar Shoulders NP would like him to start Jardiance and get BMP, BNP in 1 week and then depending on the lab results start pt on a half a dose of Entresto 24/26 mg twice daily. Pt verbalized understanding.

## 2023-07-25 ENCOUNTER — TELEPHONE (OUTPATIENT)
Dept: CARDIOLOGY CLINIC | Age: 68
End: 2023-07-25

## 2023-07-25 NOTE — TELEPHONE ENCOUNTER
Submitted PA for JARDIANCE  Via CMM  Key:  [id number] STATUS: PENDING. Follow up done daily; if no response in three days we will refax for status check. If another three days goes by with no response we will call the insurance for status.

## 2023-07-26 ENCOUNTER — TELEPHONE (OUTPATIENT)
Dept: CARDIOLOGY CLINIC | Age: 68
End: 2023-07-26

## 2023-07-26 ENCOUNTER — TELEPHONE (OUTPATIENT)
Dept: SURGERY | Age: 68
End: 2023-07-26

## 2023-07-26 ENCOUNTER — OFFICE VISIT (OUTPATIENT)
Dept: SURGERY | Age: 68
End: 2023-07-26
Payer: COMMERCIAL

## 2023-07-26 VITALS
BODY MASS INDEX: 37.24 KG/M2 | WEIGHT: 266 LBS | OXYGEN SATURATION: 94 % | SYSTOLIC BLOOD PRESSURE: 128 MMHG | RESPIRATION RATE: 16 BRPM | HEIGHT: 71 IN | HEART RATE: 56 BPM | TEMPERATURE: 98.6 F | DIASTOLIC BLOOD PRESSURE: 73 MMHG

## 2023-07-26 DIAGNOSIS — K64.2 GRADE III HEMORRHOIDS: Primary | ICD-10-CM

## 2023-07-26 PROCEDURE — 99204 OFFICE O/P NEW MOD 45 MIN: CPT | Performed by: SURGERY

## 2023-07-26 PROCEDURE — 1123F ACP DISCUSS/DSCN MKR DOCD: CPT | Performed by: SURGERY

## 2023-07-26 NOTE — TELEPHONE ENCOUNTER
Dr. Kendra Khan with [facility] colon and rectal called Wanting him to hold his Eliquis for 3 days and Aspirin for 7 days prior for a in office procedure.  The fax number is [phone] and the Phone number is [phone]

## 2023-07-26 NOTE — TELEPHONE ENCOUNTER
7/26/23      [address] [place] [address]. 1 [facility], [address] [place] [address]: [phone]  Fax: [phone]    To Whom It May Concern:    Ibis La, 1955, is a patient under our care for dismal atrial fibrillation and atrial flutter. Ibis La is on Eliquis/apixaban for a [id number] of 1. Based on current manufacturing guidelines for the discontinuation of apixaban prior to any surgical procedure, \"ELIQUIS should be discontinued at least 48 hours prior to elective surgery or invasive procedures with a moderate or high risk of unacceptable or clinically significant bleeding. ELIQUIS should be discontinued at least 24 hours prior to elective surgery or invasive procedures with a low risk of bleeding or where the bleeding would be noncritical in location and easily controlled. Bridging anticoagulation during the 24 to 48 hours after stopping ELIQUIS and prior to the intervention is not generally required. ELIQUIS should be restarted after the surgical or other procedures as soon as adequate hemostasis has been established. \"    Please limit time off anticoagulation. If you have any other questions, please feel free to contact us.     Sincerely,          Petra Ann CNP

## 2023-07-26 NOTE — TELEPHONE ENCOUNTER
Spoke to the office of Dr. Dacia Fowler to request patient be off Eliquis for 3 days and ASA for 7 days prior to hemorrhoid banding. Will reach out to patient to schedule RBL once we get approval from cardiology to hold blood thinners.

## 2023-07-26 NOTE — PROGRESS NOTES
lubricated index finger revealing: no masses, induration, or tenderness. No gross blood. Normal tone. Lubricated anoscope inserted gently into anus and withdrawn with light attachment for visualization of distal rectum and anal canal: Mucosa appeared normal, without masses or evidence of proctitis. Moderately inflamed internal hemorrhoidal tissue noted. Anoscope removed without difficulty. Patient tolerated procedure well without complications. See below for further recommendations. Last colonoscopy: 5 yrs ago [place] - negative      Assessment/Plan:     A/P:  New problem(s): Hemorrhoids: Moderately inflamed internal hemorrhoids  Established problem(s): on A/C for afib  Additional workup/treatment planned and options discussed:   1) Lifestyle and stool regimen: Fiber supplementation, sitz baths, improving dietary and lifestyle choices  2) Procedural: Rubber band ligation  3) Hemorrhoidectomy - if fails less invasive meaures  Risk of complications/morbidity: moderate    Patient has signs, symptoms, and exam consistent with moderately inflamed internal hemorrhoids. We discussed the pathophysiology, etiology, natural history, workup, and treatment options, including procedural and surgical risks for hemorrhoids. We will start with conservative management, including OTC or prescription fiber supplementation, water, healthy fruits and vegetables, and medical management. We discussed avoiding straining, constipation, and diarrhea. Sitz baths discussed. If symptoms do not improve in a reasonable amount of time or patient is already having regular soft stool with conservative management, patient may require more invasive treatment options, such as in-office rubber band ligation. We discussed the rubber band ligation procedure, including prep, expectations, potential complications, postoperative discomfort.   We discussed that this would be without sedation or anesthesia, and would likely result in

## 2023-07-26 NOTE — TELEPHONE ENCOUNTER
Can we let patient know that Insurance will not cover it despite the new guidelines that say Roxene Victoria is a 2a indication for HFpEF? So frustrating.

## 2023-07-26 NOTE — TELEPHONE ENCOUNTER
This has been DENIED, denial letter scanned into the chart. Insurance states pt has to have an EF of less than 40%, a BNP of over 400 and NYHA class of II-IV.

## 2023-07-31 ENCOUNTER — PROCEDURE VISIT (OUTPATIENT)
Dept: CARDIOLOGY CLINIC | Age: 68
End: 2023-07-31
Payer: COMMERCIAL

## 2023-07-31 DIAGNOSIS — I48.0 PAROXYSMAL ATRIAL FIBRILLATION (HCC): ICD-10-CM

## 2023-07-31 LAB
LV EF: 58 %
LVEF MODALITY: NORMAL

## 2023-07-31 PROCEDURE — 93306 TTE W/DOPPLER COMPLETE: CPT | Performed by: INTERNAL MEDICINE

## 2023-08-11 ENCOUNTER — OFFICE VISIT (OUTPATIENT)
Dept: FAMILY MEDICINE CLINIC | Age: 68
End: 2023-08-11
Payer: COMMERCIAL

## 2023-08-11 VITALS
BODY MASS INDEX: 36.79 KG/M2 | TEMPERATURE: 96.9 F | OXYGEN SATURATION: 97 % | DIASTOLIC BLOOD PRESSURE: 62 MMHG | SYSTOLIC BLOOD PRESSURE: 100 MMHG | HEART RATE: 73 BPM | WEIGHT: 262.8 LBS | HEIGHT: 71 IN

## 2023-08-11 DIAGNOSIS — Z00.00 ANNUAL PHYSICAL EXAM: ICD-10-CM

## 2023-08-11 DIAGNOSIS — Z00.00 ANNUAL PHYSICAL EXAM: Primary | ICD-10-CM

## 2023-08-11 LAB
CHOLEST SERPL-MCNC: 134 MG/DL (ref 0–199)
GLUCOSE P FAST SERPL-MCNC: 124 MG/DL (ref 70–99)
HDLC SERPL-MCNC: 45 MG/DL (ref 40–60)
LDLC SERPL CALC-MCNC: 63 MG/DL
TRIGL SERPL-MCNC: 128 MG/DL (ref 0–150)
VLDLC SERPL CALC-MCNC: 26 MG/DL

## 2023-08-11 PROCEDURE — 99397 PER PM REEVAL EST PAT 65+ YR: CPT | Performed by: FAMILY MEDICINE

## 2023-08-11 NOTE — PROGRESS NOTES
Subjective    Salma Stratton is a 79 y.o. Male who came into the clinic today for his annual physical and for appropriate   management. The patient informs me that he has been taking his medications as prescribed and has not   noticed any side effect from the same. The patient did bring in a blood works physician results form for me   to sign. He will get the required lab work done and we will complete the form and will send to appropriate   authorities as requested. Today the patient did not have any other questions or concerns and all the question   and concerns were appropriately answered.     I reviewed and discussed below mentioned labs with the patient today:    Lab Results   Component Value Date/Time    WBC 7.9 06/26/2023 07:54 AM    RBC 3.70 06/26/2023 07:54 AM    MCV 82.8 06/26/2023 07:54 AM    MCHC 33.7 06/26/2023 07:54 AM     Lab Results   Component Value Date/Time    ANIONGAP 8 07/13/2023 07:51 AM    GLUCOSE 123 07/13/2023 07:51 AM    BUN 23 07/13/2023 07:51 AM    CREATININE 0.9 07/13/2023 07:51 AM    AGRATIO 1.2 07/13/2023 07:51 AM    CALCIUM 8.8 07/13/2023 07:51 AM     Lab Results   Component Value Date/Time    PSA 0.94 05/12/2022 02:17 PM       Past Medical History:   Diagnosis Date    Atrial fibrillation (HCC)     BPH (benign prostatic hyperplasia)     Urge incontinence        Patient Active Problem List   Diagnosis    Permanent atrial fibrillation (HCC)    Benign prostatic hyperplasia with urinary hesitancy    Urge incontinence of urine    Hypovitaminosis D    Pre-diabetes    Long term current use of anticoagulant    Cesar's deformity of right heel    Longstanding persistent atrial fibrillation (HCC)    Atypical atrial flutter (HCC)    Paroxysmal atrial fibrillation ([address])       Past Surgical History:   Procedure Laterality Date    ACHILLES TENDON SURGERY Left     ACHILLES TENDON SURGERY Right 9/29/2022    RIGHT ACHILLES SECONDAY RECONSTRUCTION WITH CALCANEUS EXCISION performed by Taylor Walls

## 2023-08-14 DIAGNOSIS — R73.01 ELEVATED FASTING GLUCOSE: Primary | ICD-10-CM

## 2023-08-15 ENCOUNTER — OFFICE VISIT (OUTPATIENT)
Dept: SURGERY | Age: 68
End: 2023-08-15
Payer: COMMERCIAL

## 2023-08-15 VITALS
HEART RATE: 61 BPM | WEIGHT: 266 LBS | OXYGEN SATURATION: 97 % | BODY MASS INDEX: 37.24 KG/M2 | HEIGHT: 71 IN | TEMPERATURE: 97.6 F | DIASTOLIC BLOOD PRESSURE: 66 MMHG | SYSTOLIC BLOOD PRESSURE: 107 MMHG

## 2023-08-15 DIAGNOSIS — K64.2 GRADE III HEMORRHOIDS: Primary | ICD-10-CM

## 2023-08-15 PROCEDURE — 46221 LIGATION OF HEMORRHOID(S): CPT | Performed by: SURGERY

## 2023-08-15 PROCEDURE — 99024 POSTOP FOLLOW-UP VISIT: CPT | Performed by: SURGERY

## 2023-08-15 NOTE — PROGRESS NOTES
INTERNAL HEMORRHOID RUBBER BAND LIGATION PROCEDURE NOTE:    Patient presented with symptomatic internal hemorrhoids unresponsive to conservative management. See previous office notes for details of previous history and treatments. Risks of rubber band ligation explained to patient, including but not limited to: immediate and delayed bleeding, pain, infection, external hemorrhoids thrombosis, pelvic sepsis, urinary retention, sphincter dysfunction, need for additional procedures, and recurrence. Patient was previously provided with information in office AVS (after visit summary) and given opportunity to ask any questions and bring up any concerns. Chaperone/MA present in room during entire procedure. Patient was placed in either lateral or knee-chest positioning depending on patient preference. Exam table manipulated for proper visualization and lighting. Buttocks spread. Digital exam and anoscopy performed confirming internal hemorrhoids. Suction rubber band ligator used to place band in 3 positions, 1 cm proximal to the dentate line, at the apex of the internal hemorrhoid. Anoscope removed. Patient tolerated the procedure well without complication. EBL minimal. Post procedure expectations and instructions explained to patient. Written instructions provided as well. Disposition: Follow up in 4 weeks for symptom reassessment.     Electronically signed by Margy Hernandez MD on 8/15/2023 at 2:17 PM

## 2023-08-17 DIAGNOSIS — R73.01 ELEVATED FASTING GLUCOSE: ICD-10-CM

## 2023-08-18 LAB
EST. AVERAGE GLUCOSE BLD GHB EST-MCNC: 122.6 MG/DL
HBA1C MFR BLD: 5.9 %

## 2023-08-23 RX ORDER — FLECAINIDE ACETATE 100 MG/1
50 TABLET ORAL 2 TIMES DAILY
Qty: 90 TABLET | Refills: 3 | Status: SHIPPED | OUTPATIENT
Start: 2023-08-23

## 2023-08-23 NOTE — TELEPHONE ENCOUNTER
Requested Prescriptions      No prescriptions requested or ordered in this encounter            Last Office Visit: 7/20/2023     Next Office Visit: 10/26/2023

## 2023-09-20 ENCOUNTER — OFFICE VISIT (OUTPATIENT)
Dept: SURGERY | Age: 68
End: 2023-09-20
Payer: COMMERCIAL

## 2023-09-20 VITALS
DIASTOLIC BLOOD PRESSURE: 75 MMHG | WEIGHT: 266 LBS | RESPIRATION RATE: 16 BRPM | TEMPERATURE: 97.9 F | BODY MASS INDEX: 37.24 KG/M2 | HEIGHT: 71 IN | OXYGEN SATURATION: 97 % | HEART RATE: 55 BPM | SYSTOLIC BLOOD PRESSURE: 126 MMHG

## 2023-09-20 DIAGNOSIS — K64.2 GRADE III HEMORRHOIDS: Primary | ICD-10-CM

## 2023-09-20 PROCEDURE — 99024 POSTOP FOLLOW-UP VISIT: CPT | Performed by: SURGERY

## 2023-09-20 PROCEDURE — 46221 LIGATION OF HEMORRHOID(S): CPT | Performed by: SURGERY

## 2023-09-20 NOTE — PROGRESS NOTES
INTERNAL HEMORRHOID RUBBER BAND LIGATION PROCEDURE NOTE:    Patient presented with symptomatic internal hemorrhoids unresponsive to conservative management. See previous office notes for details of previous history and treatments. Risks of rubber band ligation explained to patient, including but not limited to: immediate and delayed bleeding, pain, infection, external hemorrhoids thrombosis, pelvic sepsis, urinary retention, sphincter dysfunction, need for additional procedures, and recurrence. Patient was previously provided with information in office AVS (after visit summary) and given opportunity to ask any questions and bring up any concerns. Chaperone/MA present in room during entire procedure. Patient was placed in either lateral or knee-chest positioning depending on patient preference. Exam table manipulated for proper visualization and lighting. Buttocks spread. Digital exam and anoscopy performed confirming internal hemorrhoids. Suction rubber band ligator used to place band in 2 positions, 1 cm proximal to the dentate line, at the apex of the internal hemorrhoid. Anoscope removed. Patient tolerated the procedure well without complication. EBL minimal. Post procedure expectations and instructions explained to patient. Written instructions provided as well. Disposition: Follow up in 4 weeks for symptom reassessment.     Electronically signed by Rosalie Cotter MD on 9/20/2023 at 2:40 PM

## 2023-10-05 NOTE — PROGRESS NOTES
HDL Cholesterol: 45 mg/dL      Total Cholesterol: 134 mg/dL      Imaging:       ECG (if available, Personally interpreted):    Last Monitor/Holter (if available):    Last Stress (if available): 12/13/22   Summary   There is a small-sized, moderate intensity, reversible defect of the   inferior wall that is consistent with ischemia. The LVEF is normal and the LV wall motion is normal.    1/6/20  Final Conclusions/Summary     Stress ECG Impressions:  Normal exercise stress test.     Summary:  Normal coronary perfusion. Preserved biventricular global and regional systolic function, with an estimated LVEF of approx 60%. Baseline hypertension. Last Cath (if available): 3/1/23  HEMODYNAMIC / ANGIOGRAPHIC DATA:    1./72, 91; /6, 12 Left ventricular end diastolic pressure was 9 mmHg. There was no gradient across the aortic valve upon pullback. 2.The left main coronary artery arises from the left coronary cusp giving rise to the left anterior descending artery and the left circumflex artery. The left main reveals no angiographically significant stenosis. 3.The left anterior descending artery arises in normal fashion from left coronary giving rise to diagonals and septal branches. The LAD reveals mild luminal irregularities. 4.The left circumflex/obtuse marginal system reveals no significant stenosis. 5.The right coronary artery is a right dominant vessel. It reveals up to 20% stenosis in its midportion. CONCLUSIONS:  Nonobstructive CAD     RECOMMENDATIONS:    1. Routine post cardiac catheterization care  2. Aggressive risk factor modification    Last TTE/KEON(if available): 12/14/22   Summary   Left ventricular cavity size is normal.   Normal left ventricular wall thickness. Left ventricular function is normal with ejection fraction estimated at   50-55%. No regional wall motion abnormalities are noted. Diastolic function cannot be assessed due to an arrhythmia.    Mild to moderate mitral

## 2023-10-06 ENCOUNTER — OFFICE VISIT (OUTPATIENT)
Dept: CARDIOLOGY CLINIC | Age: 68
End: 2023-10-06
Payer: COMMERCIAL

## 2023-10-06 VITALS
HEIGHT: 71 IN | OXYGEN SATURATION: 98 % | SYSTOLIC BLOOD PRESSURE: 108 MMHG | HEART RATE: 63 BPM | DIASTOLIC BLOOD PRESSURE: 74 MMHG | WEIGHT: 265 LBS | BODY MASS INDEX: 37.1 KG/M2

## 2023-10-06 DIAGNOSIS — I48.0 PAROXYSMAL ATRIAL FIBRILLATION (HCC): Primary | ICD-10-CM

## 2023-10-06 DIAGNOSIS — I25.10 CORONARY ARTERY DISEASE INVOLVING NATIVE HEART, UNSPECIFIED VESSEL OR LESION TYPE, UNSPECIFIED WHETHER ANGINA PRESENT: ICD-10-CM

## 2023-10-06 DIAGNOSIS — I50.30 HEART FAILURE WITH PRESERVED EJECTION FRACTION, UNSPECIFIED HF CHRONICITY (HCC): ICD-10-CM

## 2023-10-06 PROCEDURE — 99214 OFFICE O/P EST MOD 30 MIN: CPT | Performed by: INTERNAL MEDICINE

## 2023-10-06 PROCEDURE — 1123F ACP DISCUSS/DSCN MKR DOCD: CPT | Performed by: INTERNAL MEDICINE

## 2023-10-06 NOTE — PATIENT INSTRUCTIONS
Doing well. BP controlled.    Lipids at goal  Follow up with EP as planned  Follow up with me in 6 months

## 2023-10-25 PROBLEM — I48.0 PAROXYSMAL ATRIAL FIBRILLATION (HCC): Chronic | Status: ACTIVE | Noted: 2023-07-13

## 2023-10-25 PROBLEM — E66.812 CLASS 2 SEVERE OBESITY DUE TO EXCESS CALORIES WITH SERIOUS COMORBIDITY AND BODY MASS INDEX (BMI) OF 37.0 TO 37.9 IN ADULT: Chronic | Status: ACTIVE | Noted: 2023-10-25

## 2023-10-25 PROBLEM — I50.30 HEART FAILURE WITH PRESERVED EJECTION FRACTION (HCC): Chronic | Status: ACTIVE | Noted: 2023-10-06

## 2023-10-25 PROBLEM — E66.01 CLASS 2 SEVERE OBESITY DUE TO EXCESS CALORIES WITH SERIOUS COMORBIDITY AND BODY MASS INDEX (BMI) OF 37.0 TO 37.9 IN ADULT (HCC): Chronic | Status: ACTIVE | Noted: 2023-10-25

## 2023-10-25 PROBLEM — I25.10 CORONARY ARTERY DISEASE INVOLVING NATIVE HEART: Chronic | Status: ACTIVE | Noted: 2023-10-06

## 2023-10-25 PROBLEM — I34.0 NONRHEUMATIC MITRAL VALVE REGURGITATION: Status: ACTIVE | Noted: 2023-10-25

## 2023-10-26 ENCOUNTER — OFFICE VISIT (OUTPATIENT)
Dept: CARDIOLOGY CLINIC | Age: 68
End: 2023-10-26

## 2023-10-26 VITALS
SYSTOLIC BLOOD PRESSURE: 104 MMHG | WEIGHT: 264 LBS | HEART RATE: 57 BPM | DIASTOLIC BLOOD PRESSURE: 62 MMHG | BODY MASS INDEX: 36.82 KG/M2

## 2023-10-26 DIAGNOSIS — I25.10 CORONARY ARTERY DISEASE INVOLVING NATIVE HEART, UNSPECIFIED VESSEL OR LESION TYPE, UNSPECIFIED WHETHER ANGINA PRESENT: Chronic | ICD-10-CM

## 2023-10-26 DIAGNOSIS — I48.0 PAROXYSMAL ATRIAL FIBRILLATION (HCC): Primary | Chronic | ICD-10-CM

## 2023-10-26 DIAGNOSIS — I34.0 NONRHEUMATIC MITRAL VALVE REGURGITATION: ICD-10-CM

## 2023-10-26 DIAGNOSIS — E66.01 CLASS 2 SEVERE OBESITY DUE TO EXCESS CALORIES WITH SERIOUS COMORBIDITY AND BODY MASS INDEX (BMI) OF 37.0 TO 37.9 IN ADULT (HCC): Chronic | ICD-10-CM

## 2023-11-09 ENCOUNTER — HOSPITAL ENCOUNTER (OUTPATIENT)
Dept: SLEEP CENTER | Age: 68
Discharge: HOME | End: 2023-11-09
Payer: COMMERCIAL

## 2023-11-09 DIAGNOSIS — R06.83 SNORING: ICD-10-CM

## 2023-11-09 DIAGNOSIS — G47.10 HYPERSOMNIA: ICD-10-CM

## 2023-11-09 PROCEDURE — 95806 SLEEP STUDY UNATT&RESP EFFT: CPT

## 2023-11-09 RX ORDER — ASPIRIN 81 MG/1
81 TABLET, COATED ORAL DAILY
Qty: 90 TABLET | Refills: 1 | Status: SHIPPED | OUTPATIENT
Start: 2023-11-09

## 2023-11-15 RX ORDER — FLECAINIDE ACETATE 100 MG/1
50 TABLET ORAL 2 TIMES DAILY
Qty: 90 TABLET | Refills: 3 | Status: SHIPPED | OUTPATIENT
Start: 2023-11-15

## 2023-11-15 NOTE — TELEPHONE ENCOUNTER
Requested Prescriptions     Pending Prescriptions Disp Refills    flecainide (TAMBOCOR) 100 MG tablet 90 tablet 3     Sig: Take 0.5 tablets by mouth 2 times daily            Last Office Visit: 10/26/2023     Next Office Visit: Visit date not found

## 2023-11-16 ENCOUNTER — TELEPHONE (OUTPATIENT)
Dept: PULMONOLOGY | Age: 68
End: 2023-11-16

## 2023-11-20 ENCOUNTER — TELEPHONE (OUTPATIENT)
Dept: PULMONOLOGY | Age: 68
End: 2023-11-20

## 2023-11-21 NOTE — TELEPHONE ENCOUNTER
Spoke with pt  11/20/23 to review HST results. Order to be sent to [facility]. Pt to schedule f/u.

## 2024-01-11 ENCOUNTER — TELEPHONE (OUTPATIENT)
Dept: PULMONOLOGY | Age: 69
End: 2024-01-11

## 2024-01-11 NOTE — PROGRESS NOTES
René DANIEL Darrius         : 1955  [facility]    Diagnosis: [x] JOSEP (G47.33) [] CSA (G47.31) [] Apnea (G47.30)   Length of Need: [x] 18 Months [] 99 Months [] Other:    Machine (JOSIE!): [] Respironics Dream Station   2   Auto [x] ResMed AirSense     Auto S11 or S10 (if bilevel) [] Other:     [x]  CPAP () [] Bilevel ()   Mode: [x] Auto [] Spontaneous    Mode: [] Auto [] Spontaneous      P min 6 cmH2O  P max 16 cmH2O      Comfort Settings:   - Ramp Pressure: 5 cmH2O                                        - Ramp time: 15 min                                     -  Flex/EPR - 3 full time                                    - For ResMed Bilevel (TiMax-4 sec   TiMin- 0.2 sec)     Humidifier: [x] Heated ()        [x] Water chamber replacement ()/ 1 per 6 months        Mask:   [x] Nasal () /1 per 3 months [] Full Face () /1 per 3 months   [x] Patient choice -Size and fit mask [] Patient Choice - Size and fit mask   [] Dispense:  [] Dispense:    [x] Headgear () / 1 per 3 months [] Headgear () / 1 per 3 months   [x] Replacement Nasal Cushion ()/2 per month [] Interface Replacement ()/1 per month   [] Replacement Nasal Pillows ()/2 per month         Tubing: [x] Heated ()/1 per 3 months    [] Standard ()/1 per 3 months [] Other:           Filters: [x] Non-disposable ()/1 per 6 months     [x] Ultra-Fine, Disposable ()/2 per month        Miscellaneous: [] Chin Strap ()/ 1 per 6 months [] O2 bleed-in:       LPM   [] Oximetry on CPAP/Bilevel []  Other:    [x] Modem: ()         Start Order Date: 24    MEDICAL JUSTIFICATION:  I, the undersigned, certify that the above prescribed supplies are medically necessary for this patient’s wellbeing.  In my opinion, the supplies are both reasonable and necessary in reference to accepted standards of medicalpractice in treatment of this patient’s condition.    BETH CONNOR MD      NPI: [id number]

## 2024-01-13 DIAGNOSIS — N40.1 BENIGN PROSTATIC HYPERPLASIA WITH URINARY HESITANCY: ICD-10-CM

## 2024-01-13 DIAGNOSIS — N39.41 URGE INCONTINENCE OF URINE: ICD-10-CM

## 2024-01-13 DIAGNOSIS — R39.11 BENIGN PROSTATIC HYPERPLASIA WITH URINARY HESITANCY: ICD-10-CM

## 2024-01-15 RX ORDER — TAMSULOSIN HYDROCHLORIDE 0.4 MG/1
0.8 CAPSULE ORAL DAILY
Qty: 180 CAPSULE | Refills: 0 | Status: SHIPPED | OUTPATIENT
Start: 2024-01-15

## 2024-01-15 RX ORDER — OXYBUTYNIN CHLORIDE 5 MG/1
5 TABLET, EXTENDED RELEASE ORAL DAILY
Qty: 90 TABLET | Refills: 0 | Status: SHIPPED | OUTPATIENT
Start: 2024-01-15

## 2024-02-07 RX ORDER — ATORVASTATIN CALCIUM 10 MG/1
TABLET, FILM COATED ORAL
Qty: 90 TABLET | Refills: 2 | Status: SHIPPED | OUTPATIENT
Start: 2024-02-07

## 2024-03-15 ENCOUNTER — TELEPHONE (OUTPATIENT)
Dept: SURGERY | Age: 69
End: 2024-03-15

## 2024-03-15 NOTE — TELEPHONE ENCOUNTER
----- Message from René Rizo sent at 3/13/2024  9:13 PM EDT -----  Regarding: Colonoscopy  Contact: [phone]  Dr. Hines,  do you perform colonoscopies?  I am one of your patients that you did a couple of hemorrhoid procedures last year and I am past due for a colonoscopy.  If you don't do them can you refer me to a dr. and practice that does.  if you do I'd like to set up an appointment.       Thank you very much.  René Rizo

## 2024-03-19 ENCOUNTER — PREP FOR PROCEDURE (OUTPATIENT)
Dept: SURGERY | Age: 69
End: 2024-03-19

## 2024-03-19 DIAGNOSIS — Z12.11 SCREEN FOR COLON CANCER: ICD-10-CM

## 2024-03-19 RX ORDER — POLYETHYLENE GLYCOL 3350, SODIUM SULFATE ANHYDROUS, SODIUM BICARBONATE, SODIUM CHLORIDE, POTASSIUM CHLORIDE 236; 22.74; 6.74; 5.86; 2.97 G/4L; G/4L; G/4L; G/4L; G/4L
POWDER, FOR SOLUTION ORAL
Qty: 1 EACH | Refills: 0 | Status: SHIPPED | OUTPATIENT
Start: 2024-03-19

## 2024-03-19 NOTE — TELEPHONE ENCOUNTER
Patient has been scheduled for:    Procedure:Colonoscopy   Date:5/1/24  Time:11:00 AM   Arrival:9:30 AM   Hospital:[facility]     ASA?:Aspirin hold for 7 days   Prep? Donna, reviewed on phone and emailed     Pre-op?N/A    Post-op Appt? N/A    Patient advised they will need a .    Orders faxed to surgery scheduling.    Medication sent to Pharmacy: [facility]     Stents or ostomy marking?    Instructions have been mailed/emailed to: [email or web address]     Added to outlook calendar

## 2024-03-25 ASSESSMENT — PATIENT HEALTH QUESTIONNAIRE - PHQ9
SUM OF ALL RESPONSES TO PHQ9 QUESTIONS 1 & 2: 0
SUM OF ALL RESPONSES TO PHQ QUESTIONS 1-9: 0
2. FEELING DOWN, DEPRESSED OR HOPELESS: NOT AT ALL
SUM OF ALL RESPONSES TO PHQ QUESTIONS 1-9: 0
SUM OF ALL RESPONSES TO PHQ QUESTIONS 1-9: 0
1. LITTLE INTEREST OR PLEASURE IN DOING THINGS: NOT AT ALL
1. LITTLE INTEREST OR PLEASURE IN DOING THINGS: NOT AT ALL
SUM OF ALL RESPONSES TO PHQ QUESTIONS 1-9: 0
2. FEELING DOWN, DEPRESSED OR HOPELESS: NOT AT ALL
SUM OF ALL RESPONSES TO PHQ9 QUESTIONS 1 & 2: 0

## 2024-03-27 NOTE — PROGRESS NOTES
to thank you for providing me the opportunity to participate in the care of your patient. If you have any questions, please do not hesitate to contact me.     Kadeem Franz MD, [place] [id number]  Main Office Phone: [phone]  Fax: [phone]    Physician Attestation:  The scribes documentation has been prepared under my direction and personally reviewed by me in its entirety.     I confirm the note above accurately reflects all work, treatment, procedures, and medical decision making performed by me.    Electronically signed by Kadeem Franz MD on 4/3/2024 at 4:05 PM

## 2024-03-28 ENCOUNTER — OFFICE VISIT (OUTPATIENT)
Dept: FAMILY MEDICINE CLINIC | Age: 69
End: 2024-03-28
Payer: COMMERCIAL

## 2024-03-28 VITALS
DIASTOLIC BLOOD PRESSURE: 70 MMHG | TEMPERATURE: 96.8 F | BODY MASS INDEX: 37.3 KG/M2 | WEIGHT: 266.4 LBS | HEART RATE: 66 BPM | OXYGEN SATURATION: 99 % | SYSTOLIC BLOOD PRESSURE: 118 MMHG | HEIGHT: 71 IN

## 2024-03-28 DIAGNOSIS — Z00.00 ANNUAL PHYSICAL EXAM: Primary | ICD-10-CM

## 2024-03-28 DIAGNOSIS — Z12.5 PROSTATE CANCER SCREENING: ICD-10-CM

## 2024-03-28 DIAGNOSIS — Z00.00 ANNUAL PHYSICAL EXAM: ICD-10-CM

## 2024-03-28 PROCEDURE — 99397 PER PM REEVAL EST PAT 65+ YR: CPT | Performed by: FAMILY MEDICINE

## 2024-03-28 SDOH — ECONOMIC STABILITY: FOOD INSECURITY: WITHIN THE PAST 12 MONTHS, YOU WORRIED THAT YOUR FOOD WOULD RUN OUT BEFORE YOU GOT MONEY TO BUY MORE.: NEVER TRUE

## 2024-03-28 SDOH — ECONOMIC STABILITY: INCOME INSECURITY: HOW HARD IS IT FOR YOU TO PAY FOR THE VERY BASICS LIKE FOOD, HOUSING, MEDICAL CARE, AND HEATING?: NOT HARD AT ALL

## 2024-03-28 SDOH — ECONOMIC STABILITY: FOOD INSECURITY: WITHIN THE PAST 12 MONTHS, THE FOOD YOU BOUGHT JUST DIDN'T LAST AND YOU DIDN'T HAVE MONEY TO GET MORE.: NEVER TRUE

## 2024-03-29 LAB
CHOLEST SERPL-MCNC: 116 MG/DL (ref 0–199)
GLUCOSE P FAST SERPL-MCNC: 93 MG/DL (ref 70–99)
HDLC SERPL-MCNC: 43 MG/DL (ref 40–60)
LDL CHOLESTEROL CALCULATED: 51 MG/DL
PSA SERPL DL<=0.01 NG/ML-MCNC: 1.39 NG/ML (ref 0–4)
TRIGL SERPL-MCNC: 108 MG/DL (ref 0–150)
VLDLC SERPL CALC-MCNC: 22 MG/DL

## 2024-03-29 NOTE — PROGRESS NOTES
ventilatory breath sounds are heard bilaterally. No crackles   or wheezes heard.    CARDIOVASCULAR:  Normal S1, S2 heard. No murmurs heard. No JVD.    GASTROINTESTINAL:  Abdomen soft, nontender. No guarding or rigidity noted.   No organomegaly noted. Normal bowel sounds were heard.    EXTREMITIES:  All 4 extremities were moving fine. Full range of motion is   present. No deformity noted. Peripheral pulses were felt. Bilateral trace lower   extremity edema. Neurovascular integrity maintained.    NEUROLOGICAL:  The patient was alert, conscious, and cooperative. Oriented   to time, place, and person. Muscle power in all 4 limbs is 5/5. Cranial   nerves II thru XII are grossly intact. No sensory or motor loss.    PSYCHIATRIC:  Appropriate mood and affect. No flights of ideas or thoughts.   Intact recent and past memory. No confusion or delirium. No suicidal or homicidal ideations.    BACK:  Normal alignment of the spine. Full range of motion is present. No   stepoff. No deformity. No spinal or paraspinal tenderness noted.    Assessment/Plan     Diagnoses and all orders for this visit:    Annual physical exam  -     Lipid, Fasting; Future  -     Glucose, Fasting; Future    Prostate cancer screening  -     PSA, Prostatic Specific Antigen ([facility]); Future      Advise given:  - Get appropriate investigations done. Will call back with the results and will manage accordingly.  - Take all prescription medication as prescribed.  - Drink plenty of fluids.   - Eat five servings of fruits and vegetables everyday.  - Discussed importance of regular exercise and recommended starting or continuing a regular exercise program for good health.   - Try to lose weight with diet and exercise as discussed.  - The importance of annual eye exams was reviewed.   - The importance of proper foot care and regularly checking feet to prevent sores and possibly loss of limbs was reviewed.   - The importance of keeping the blood pressure

## 2024-04-03 ENCOUNTER — OFFICE VISIT (OUTPATIENT)
Dept: CARDIOLOGY CLINIC | Age: 69
End: 2024-04-03

## 2024-04-03 VITALS
HEART RATE: 59 BPM | WEIGHT: 268.8 LBS | BODY MASS INDEX: 37.49 KG/M2 | DIASTOLIC BLOOD PRESSURE: 68 MMHG | SYSTOLIC BLOOD PRESSURE: 116 MMHG | OXYGEN SATURATION: 99 %

## 2024-04-03 DIAGNOSIS — I25.10 CORONARY ARTERY DISEASE INVOLVING NATIVE HEART, UNSPECIFIED VESSEL OR LESION TYPE, UNSPECIFIED WHETHER ANGINA PRESENT: ICD-10-CM

## 2024-04-03 DIAGNOSIS — I48.0 PAROXYSMAL ATRIAL FIBRILLATION (HCC): Primary | ICD-10-CM

## 2024-04-18 PROBLEM — Z12.11 SCREEN FOR COLON CANCER: Status: RESOLVED | Noted: 2024-03-19 | Resolved: 2024-04-18

## 2024-04-19 DIAGNOSIS — R39.11 BENIGN PROSTATIC HYPERPLASIA WITH URINARY HESITANCY: ICD-10-CM

## 2024-04-19 DIAGNOSIS — N39.41 URGE INCONTINENCE OF URINE: ICD-10-CM

## 2024-04-19 DIAGNOSIS — N40.1 BENIGN PROSTATIC HYPERPLASIA WITH URINARY HESITANCY: ICD-10-CM

## 2024-04-19 RX ORDER — TAMSULOSIN HYDROCHLORIDE 0.4 MG/1
0.8 CAPSULE ORAL DAILY
Qty: 180 CAPSULE | Refills: 0 | Status: SHIPPED | OUTPATIENT
Start: 2024-04-19

## 2024-04-19 RX ORDER — OXYBUTYNIN CHLORIDE 5 MG/1
5 TABLET, EXTENDED RELEASE ORAL DAILY
Qty: 90 TABLET | Refills: 0 | Status: SHIPPED | OUTPATIENT
Start: 2024-04-19

## 2024-04-19 NOTE — TELEPHONE ENCOUNTER
Requested Prescriptions     Pending Prescriptions Disp Refills    oxyBUTYnin (DITROPAN-XL) 5 MG extended release tablet [Pharmacy Med Name: OXYBUTYNIN CL ER 5 MG TABLET] 90 tablet 0     Sig: TAKE 1 TABLET BY MOUTH EVERY DAY    tamsulosin (FLOMAX) 0.4 MG capsule [Pharmacy Med Name: TAMSULOSIN HCL 0.4 MG CAPSULE] 180 capsule 0     Sig: TAKE 2 CAPSULES BY MOUTH EVERY DAY     LOV 3.28.24  No FOV scheduled

## 2024-04-22 RX ORDER — METOPROLOL SUCCINATE 100 MG/1
TABLET, EXTENDED RELEASE ORAL
Qty: 180 TABLET | Refills: 3 | Status: SHIPPED | OUTPATIENT
Start: 2024-04-22

## 2024-04-30 ENCOUNTER — TELEPHONE (OUTPATIENT)
Dept: SURGERY | Age: 69
End: 2024-04-30

## 2024-04-30 NOTE — TELEPHONE ENCOUNTER
Patient called to confirm colonoscopy on 5/1. Arrival time 9:30 am, clear liquid diet, need  18 or older.

## 2024-05-01 ENCOUNTER — ANESTHESIA EVENT (OUTPATIENT)
Dept: ENDOSCOPY | Age: 69
End: 2024-05-01
Payer: COMMERCIAL

## 2024-05-01 ENCOUNTER — HOSPITAL ENCOUNTER (OUTPATIENT)
Age: 69
Setting detail: OUTPATIENT SURGERY
Discharge: HOME | End: 2024-05-01
Attending: SURGERY | Admitting: SURGERY
Payer: COMMERCIAL

## 2024-05-01 ENCOUNTER — ANESTHESIA (OUTPATIENT)
Dept: ENDOSCOPY | Age: 69
End: 2024-05-01
Payer: COMMERCIAL

## 2024-05-01 VITALS
BODY MASS INDEX: 37.1 KG/M2 | HEIGHT: 71 IN | TEMPERATURE: 97.1 F | HEART RATE: 60 BPM | SYSTOLIC BLOOD PRESSURE: 134 MMHG | RESPIRATION RATE: 15 BRPM | WEIGHT: 265 LBS | DIASTOLIC BLOOD PRESSURE: 71 MMHG | OXYGEN SATURATION: 99 %

## 2024-05-01 PROCEDURE — 2580000003 HC RX 258: Performed by: ANESTHESIOLOGY

## 2024-05-01 PROCEDURE — 3609027000 HC COLONOSCOPY: Performed by: SURGERY

## 2024-05-01 PROCEDURE — 7100000010 HC PHASE II RECOVERY - FIRST 15 MIN: Performed by: SURGERY

## 2024-05-01 PROCEDURE — 3700000001 HC ADD 15 MINUTES (ANESTHESIA): Performed by: SURGERY

## 2024-05-01 PROCEDURE — 7100000011 HC PHASE II RECOVERY - ADDTL 15 MIN: Performed by: SURGERY

## 2024-05-01 PROCEDURE — 6360000002 HC RX W HCPCS

## 2024-05-01 PROCEDURE — 3700000000 HC ANESTHESIA ATTENDED CARE: Performed by: SURGERY

## 2024-05-01 PROCEDURE — 45378 DIAGNOSTIC COLONOSCOPY: CPT | Performed by: SURGERY

## 2024-05-01 RX ORDER — SODIUM CHLORIDE 0.9 % (FLUSH) 0.9 %
5-40 SYRINGE (ML) INJECTION EVERY 12 HOURS SCHEDULED
Status: DISCONTINUED | OUTPATIENT
Start: 2024-05-01 | End: 2024-05-01 | Stop reason: HOSPADM

## 2024-05-01 RX ORDER — LIDOCAINE HYDROCHLORIDE 10 MG/ML
1 INJECTION, SOLUTION EPIDURAL; INFILTRATION; INTRACAUDAL; PERINEURAL
Status: DISCONTINUED | OUTPATIENT
Start: 2024-05-01 | End: 2024-05-01 | Stop reason: HOSPADM

## 2024-05-01 RX ORDER — LIDOCAINE HYDROCHLORIDE 20 MG/ML
INJECTION, SOLUTION INTRAVENOUS PRN
Status: DISCONTINUED | OUTPATIENT
Start: 2024-05-01 | End: 2024-05-01 | Stop reason: SDUPTHER

## 2024-05-01 RX ORDER — PROPOFOL 10 MG/ML
INJECTION, EMULSION INTRAVENOUS PRN
Status: DISCONTINUED | OUTPATIENT
Start: 2024-05-01 | End: 2024-05-01 | Stop reason: SDUPTHER

## 2024-05-01 RX ORDER — SODIUM CHLORIDE, SODIUM LACTATE, POTASSIUM CHLORIDE, CALCIUM CHLORIDE 600; 310; 30; 20 MG/100ML; MG/100ML; MG/100ML; MG/100ML
INJECTION, SOLUTION INTRAVENOUS CONTINUOUS
Status: DISCONTINUED | OUTPATIENT
Start: 2024-05-01 | End: 2024-05-01 | Stop reason: HOSPADM

## 2024-05-01 RX ORDER — SODIUM CHLORIDE 0.9 % (FLUSH) 0.9 %
5-40 SYRINGE (ML) INJECTION PRN
Status: DISCONTINUED | OUTPATIENT
Start: 2024-05-01 | End: 2024-05-01 | Stop reason: HOSPADM

## 2024-05-01 RX ADMIN — PROPOFOL 100 MG: 10 INJECTION, EMULSION INTRAVENOUS at 11:09

## 2024-05-01 RX ADMIN — PROPOFOL 50 MG: 10 INJECTION, EMULSION INTRAVENOUS at 11:20

## 2024-05-01 RX ADMIN — LIDOCAINE HYDROCHLORIDE 100 MG: 20 INJECTION, SOLUTION INTRAVENOUS at 11:09

## 2024-05-01 RX ADMIN — PROPOFOL 50 MG: 10 INJECTION, EMULSION INTRAVENOUS at 11:15

## 2024-05-01 RX ADMIN — SODIUM CHLORIDE, POTASSIUM CHLORIDE, SODIUM LACTATE AND CALCIUM CHLORIDE: 600; 310; 30; 20 INJECTION, SOLUTION INTRAVENOUS at 10:27

## 2024-05-01 ASSESSMENT — LIFESTYLE VARIABLES: SMOKING_STATUS: 0

## 2024-05-01 ASSESSMENT — PAIN - FUNCTIONAL ASSESSMENT
PAIN_FUNCTIONAL_ASSESSMENT: 0-10
PAIN_FUNCTIONAL_ASSESSMENT: 0-10
PAIN_FUNCTIONAL_ASSESSMENT: NONE - DENIES PAIN

## 2024-05-01 ASSESSMENT — ENCOUNTER SYMPTOMS: DYSPNEA ACTIVITY LEVEL: AFTER AMBULATING 1 FLIGHT OF STAIRS

## 2024-05-01 NOTE — ANESTHESIA PRE PROCEDURE
Department of Anesthesiology  Preprocedure Note       Name:  René Rizo   Age:  68 y.o.  :  1955                                          MRN:  [id number]         Date:  2024      Surgeon: Surgeon(s):  Taurus Hines MD    Procedure:     Medications prior to admission:   Prior to Admission medications    Medication Sig Start Date End Date Taking? Authorizing Provider   metoprolol succinate (TOPROL XL) 100 MG extended release tablet TAKE 1 TABLET BY MOUTH TWICE A DAY 24   Yumi Stratton APRN - CNP   oxyBUTYnin (DITROPAN-XL) 5 MG extended release tablet TAKE 1 TABLET BY MOUTH EVERY DAY 24   Jose Alberto Mcclelland MD   tamsulosin (FLOMAX) 0.4 MG capsule TAKE 2 CAPSULES BY MOUTH EVERY DAY 24   Jose Alberto Mcclelland MD   polyethylene glycol (GOLYTELY) 236 g solution Prepare solution according to packaging instructions.  Patient not taking: Reported on 3/28/2024 3/19/24   Taurus Hines MD   atorvastatin (LIPITOR) 10 MG tablet TAKE 1 TABLET BY MOUTH EVERY DAY 24   Kadeem Franz MD   flecainide (TAMBOCOR) 100 MG tablet Take 0.5 tablets by mouth 2 times daily 11/15/23   Chiara Florentino APRN - CNP   ASPIRIN LOW DOSE 81 MG EC tablet TAKE 1 TABLET BY MOUTH EVERY DAY 23   Kadeem Franz MD       Current medications:    No current facility-administered medications for this encounter.       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

## 2024-05-01 NOTE — DISCHARGE INSTRUCTIONS
ENDOSCOPY DISCHARGE INSTRUCTIONS:    Call the physician that did your procedure for any questions or concerns:           DR. PAYNE:  [phone]      ACTIVITY:    There are potential side effects from the medications used for sedation and anesthesia during your procedure.  These include:  Dizziness or light-headedness, confusion or memory loss, delayed reaction times, loss of coordination, nausea and vomiting.  Because of your increased risk for injury, we ask that you observe the following precautions:  For the next 24 hours,  DO NOT operate an automobile, bicycle, motorcycle, , power tools or large equipment of any kind.  Do not drink alcohol, sign any legal documents or make any legal decisions for 24 hours.  Do not bend your head over lower than your heart.  DO sit on the side of bed/couch awhile before getting up.  Plan on bedrest or quiet relaxation today.  You may resume normal activities in 24 hours.                  DIET:    Your first meal today should be light, avoiding spicy and fatty foods.  If you tolerate this first meal, then you may advance to your regular diet unless otherwise advised by your physician.      NORMAL SYMPTOMS:  Mild sore throat if you had an EGD  Gaseous discomfort: belching or flatus        NOTIFY YOUR PHYSICIAN IF THESE SYMPTOMS OCCUR:  1. Fever (greater than 100)  5. Increased abdominal bloating  2. Severe pain    6. Excessive bleeding  3. Nausea and vomiting  7. Chest pain                                                                    4. Chills    8. Shortness of breath      ADDITIONAL INSTRUCTIONS:    Biopsy results: Office will notify you by mail with biopsy results. If you haven't heard from him in 1 week, please call his office.    Educational Information:     Follow up appointment:                                Please review these discharge instructions this evening or tomorrow for   information you may have forgotten.            We want to thank you for

## 2024-05-01 NOTE — ANESTHESIA POSTPROCEDURE EVALUATION
Department of Anesthesiology  Postprocedure Note    Patient: René Rizo  MRN: [id number]  YOB: 1955  Date of evaluation: 5/1/2024    Procedure Summary       Date: 05/01/24 Room / Location: [place] [address] / [facility]    Anesthesia Start: 1105 Anesthesia Stop: 1126    Procedure: COLONOSCOPY, POSSIBLE POLYPECTOMY Diagnosis:       Screen for colon cancer      (Screen for colon cancer [Z12.11])    Surgeons: Taurus Hines MD Responsible Provider: Cain Molina MD    Anesthesia Type: MAC ASA Status: 3            Anesthesia Type: No value filed.    José Miguel Phase I: José Miguel Score: 10    José Miguel Phase II: José Miguel Score: 10    Anesthesia Post Evaluation    Patient location during evaluation: PACU  Patient participation: complete - patient participated  Level of consciousness: awake and alert  Airway patency: patent  Nausea & Vomiting: no nausea and no vomiting  Cardiovascular status: hemodynamically stable  Respiratory status: acceptable  Hydration status: euvolemic  Multimodal analgesia pain management approach  Pain management: satisfactory to patient    No notable events documented.

## 2024-05-01 NOTE — H&P
regarding airway and ASA classification.    Proceed as planned for endoscopy, possible polypectomy    Risks/benefits/alternatives of procedure discussed with patient and any present family members. Risks including, but not limited to: bleeding, perforation, post polypectomy syndrome, splenic injury, need for additional procedures or surgery, risks of anesthesia. Patient understands it is their responsibility to call office for pathology results if they do not hear from my office within 1-2 weeks. All questions answered.    Electronically signed by Taurus Hines MD on 5/1/2024 at 11:08 AM

## 2024-05-01 NOTE — PROGRESS NOTES
Ambulatory Surgery/Procedure Discharge Note    Vitals:    05/01/24 1140   BP: 125/75   Pulse: 58   Resp: 16   Temp:    SpO2: 99%     Pt meets discharge criteria per José Miguel score.  No intake/output data recorded.    Restroom use offered before discharge.  Yes    Pain assessment:  none  Pain Level: 0    Pt and S.O./family states \"ready to go home\". Pt alert and oriented x4. IV removed. Denies N/V or pain.  Pt tolerating po intake. Discharge instructions given to pt and wife with pt permission. Pt and wife verbalized understanding of all instructions. Left with all belongings, and discharge instructions.     Patient discharged to home/self care. Patient discharged via wheel chair by transporter to waiting family/S.O.       5/1/2024 11:44 AM

## 2024-05-06 ENCOUNTER — OFFICE VISIT (OUTPATIENT)
Dept: FAMILY MEDICINE CLINIC | Age: 69
End: 2024-05-06
Payer: COMMERCIAL

## 2024-05-06 VITALS
HEIGHT: 71 IN | TEMPERATURE: 97.1 F | OXYGEN SATURATION: 96 % | DIASTOLIC BLOOD PRESSURE: 70 MMHG | SYSTOLIC BLOOD PRESSURE: 110 MMHG | WEIGHT: 267.6 LBS | HEART RATE: 54 BPM | BODY MASS INDEX: 37.46 KG/M2

## 2024-05-06 DIAGNOSIS — B02.9 HERPES ZOSTER WITHOUT COMPLICATION: Primary | ICD-10-CM

## 2024-05-06 PROCEDURE — 99213 OFFICE O/P EST LOW 20 MIN: CPT | Performed by: FAMILY MEDICINE

## 2024-05-06 PROCEDURE — 1123F ACP DISCUSS/DSCN MKR DOCD: CPT | Performed by: FAMILY MEDICINE

## 2024-05-06 RX ORDER — VALACYCLOVIR HYDROCHLORIDE 1 G/1
1000 TABLET, FILM COATED ORAL 3 TIMES DAILY
Qty: 21 TABLET | Refills: 0 | Status: SHIPPED | OUTPATIENT
Start: 2024-05-06 | End: 2024-05-13

## 2024-05-06 NOTE — PROGRESS NOTES
Subjective    René Rizo is a 68 y.o. Male who came into the clinic today painful rash in the right lower back since past   couple of days.  The patient informs me that he has history of shingles in the same area about 30 years back   and since past couple of days he has noticed a painful itchy rash in the area so is here to get himself checked.    I advised the patient that it is consistent with shingles and will treat it appropriately.  The patient verbalized   understanding and agreed to the plan.  Otherwise today did not have any other questions or concerns and   all the question and concerns were appropriately answered    Past Medical History:   Diagnosis Date    Atrial fibrillation (HCC)     BPH (benign prostatic hyperplasia)     Class 2 severe obesity due to excess calories with serious comorbidity and body mass index (BMI) of 37.0 to 37.9 in adult ([facility]) 10/25/2023    Hyperlipidemia     Urge incontinence        Patient Active Problem List   Diagnosis    Permanent atrial fibrillation (HCC)    Benign prostatic hyperplasia with urinary hesitancy    Urge incontinence of urine    Hypovitaminosis D    Pre-diabetes    Long term current use of anticoagulant    Cesar's deformity of right heel    Longstanding persistent atrial fibrillation (HCC)    Atypical atrial flutter (HCC)    Paroxysmal atrial fibrillation (HCC)    Heart failure with preserved ejection fraction (HCC)    Coronary artery disease involving native heart    Class 2 severe obesity due to excess calories with serious comorbidity and body mass index (BMI) of 37.0 to 37.9 in adult (HCC)    Nonrheumatic mitral valve regurgitation       Past Surgical History:   Procedure Laterality Date    ACHILLES TENDON SURGERY Left     ACHILLES TENDON SURGERY Right 9/29/2022    RIGHT ACHILLES SECONDAY RECONSTRUCTION WITH CALCANEUS EXCISION performed by Yodit Yin MD at [facility] OR    CARDIOVERSION  07/13/2023    CERVICAL SPINE SURGERY      2 2 level fusions

## 2024-05-23 RX ORDER — ASPIRIN 81 MG/1
81 TABLET, COATED ORAL DAILY
Qty: 90 TABLET | Refills: 1 | Status: SHIPPED | OUTPATIENT
Start: 2024-05-23

## 2024-08-10 ENCOUNTER — OFFICE VISIT (OUTPATIENT)
Age: 69
End: 2024-08-10

## 2024-08-10 VITALS
HEART RATE: 62 BPM | TEMPERATURE: 98.4 F | DIASTOLIC BLOOD PRESSURE: 69 MMHG | RESPIRATION RATE: 18 BRPM | SYSTOLIC BLOOD PRESSURE: 104 MMHG | OXYGEN SATURATION: 93 %

## 2024-08-10 DIAGNOSIS — M54.50 ACUTE RIGHT-SIDED LOW BACK PAIN WITHOUT SCIATICA: Primary | ICD-10-CM

## 2024-08-10 RX ORDER — NAPROXEN 500 MG/1
500 TABLET ORAL 2 TIMES DAILY WITH MEALS
Qty: 30 TABLET | Refills: 0 | Status: SHIPPED | OUTPATIENT
Start: 2024-08-10

## 2024-08-10 RX ORDER — KETOROLAC TROMETHAMINE 30 MG/ML
30 INJECTION, SOLUTION INTRAMUSCULAR; INTRAVENOUS ONCE
Status: COMPLETED | OUTPATIENT
Start: 2024-08-10 | End: 2024-08-10

## 2024-08-10 RX ORDER — METHOCARBAMOL 750 MG/1
750 TABLET, FILM COATED ORAL 4 TIMES DAILY PRN
Qty: 40 TABLET | Refills: 0 | Status: SHIPPED | OUTPATIENT
Start: 2024-08-10 | End: 2024-08-20

## 2024-08-10 RX ADMIN — KETOROLAC TROMETHAMINE 30 MG: 30 INJECTION, SOLUTION INTRAMUSCULAR; INTRAVENOUS at 12:51

## 2024-08-10 ASSESSMENT — ENCOUNTER SYMPTOMS
COUGH: 0
ABDOMINAL PAIN: 0
BACK PAIN: 1
SHORTNESS OF BREATH: 0

## 2024-08-10 NOTE — PATIENT INSTRUCTIONS
New Prescriptions    METHOCARBAMOL (ROBAXIN-750) 750 MG TABLET    Take 1 tablet by mouth 4 times daily as needed (for musculoskeletal pain)    NAPROXEN (NAPROSYN) 500 MG TABLET    Take 1 tablet by mouth 2 times daily (with meals)      Take tylenol and/or naproxen as needed for pain relief.  Take the muscle relaxer as needed for musculoskeletal pain.  Follow-up with Ortho referral given today if your pain persists.  Use heat to the affected site.  Follow-up with your PCP as needed.  Return for severe/worsening symptoms.

## 2024-08-10 NOTE — PROGRESS NOTES
René Rizo (:  1955) is a 68 y.o. male,New patient, here for evaluation of the following chief complaint(s):  Back Pain (Pt c/o low back pain and spasm, hurts to sit and get up from sitting x 1 week)    Assessment & Plan :  Visit Diagnoses and Associated Orders       Acute right-sided low back pain without sciatica    -  Primary    ketorolac (TORADOL) injection 30 mg [37953]      naproxen (NAPROSYN) 500 MG tablet [5393]      methocarbamol (ROBAXIN-750) 750 MG tablet [4972]      Reji Rodriguez MD, Orthopedic Surgery (Hip; Knee; Shoulder), [facility] [REF62 Custom]                   Differential diagnoses: musculoskeletal back sprain/strain, lumbar/thoracic compression fracture, herniated disc, sciatica, kidney stones, pyelonephritis, cauda equina syndrome     Plan: There is low concern for a compression fracture as he has not had an injury to his back or recent falls. He was given Toradol IM in the office for pain relief. He will be prescribed muscle relaxers and naproxen to take for ongoing pain relief. Encouraged to rest and increase fluid intake and to use heat to the affected site. He was given a referral to Ortho to follow-up if his pain persists. Return precautions discussed.  Follow up in 3 days if symptoms persist or if symptoms worsen.       Subjective :  HPI  René Rizo is a 68 y.o. male who presents with complaints of low back pain. He reports that he started with pain to his lower back and right lower back area starting last Saturday. He reports that he was working under his golf cart and may have pulled a muscle. He does report history of cervical back surgeries but denies any history of lower back surgeries. He denies any fevers or urinary symptoms. He denies that the pain radiates into his legs. He has needed to use a cane the last few days to get around and to help him get up from sitting positions but does not normally need to use a cane. He has been taking tylenol and

## 2024-08-12 RX ORDER — ATORVASTATIN CALCIUM 10 MG/1
TABLET, FILM COATED ORAL
Qty: 90 TABLET | Refills: 0 | Status: SHIPPED | OUTPATIENT
Start: 2024-08-12

## 2024-08-21 ENCOUNTER — OFFICE VISIT (OUTPATIENT)
Dept: FAMILY MEDICINE CLINIC | Age: 69
End: 2024-08-21
Payer: COMMERCIAL

## 2024-08-21 VITALS
BODY MASS INDEX: 37.38 KG/M2 | SYSTOLIC BLOOD PRESSURE: 110 MMHG | OXYGEN SATURATION: 94 % | DIASTOLIC BLOOD PRESSURE: 74 MMHG | WEIGHT: 268 LBS | HEART RATE: 55 BPM

## 2024-08-21 DIAGNOSIS — I48.19 PERSISTENT ATRIAL FIBRILLATION (HCC): Primary | ICD-10-CM

## 2024-08-21 DIAGNOSIS — R39.11 BENIGN PROSTATIC HYPERPLASIA WITH URINARY HESITANCY: ICD-10-CM

## 2024-08-21 DIAGNOSIS — N39.41 URGE INCONTINENCE OF URINE: ICD-10-CM

## 2024-08-21 DIAGNOSIS — R73.03 PRE-DIABETES: ICD-10-CM

## 2024-08-21 DIAGNOSIS — I25.10 CORONARY ARTERY DISEASE INVOLVING NATIVE HEART, UNSPECIFIED VESSEL OR LESION TYPE, UNSPECIFIED WHETHER ANGINA PRESENT: Chronic | ICD-10-CM

## 2024-08-21 DIAGNOSIS — Z00.00 HEALTHCARE MAINTENANCE: ICD-10-CM

## 2024-08-21 DIAGNOSIS — N40.1 BENIGN PROSTATIC HYPERPLASIA WITH URINARY HESITANCY: ICD-10-CM

## 2024-08-21 PROCEDURE — 99214 OFFICE O/P EST MOD 30 MIN: CPT | Performed by: STUDENT IN AN ORGANIZED HEALTH CARE EDUCATION/TRAINING PROGRAM

## 2024-08-21 PROCEDURE — 1123F ACP DISCUSS/DSCN MKR DOCD: CPT | Performed by: STUDENT IN AN ORGANIZED HEALTH CARE EDUCATION/TRAINING PROGRAM

## 2024-08-21 RX ORDER — OXYBUTYNIN CHLORIDE 5 MG/1
5 TABLET, EXTENDED RELEASE ORAL DAILY
Qty: 90 TABLET | Refills: 0 | Status: SHIPPED | OUTPATIENT
Start: 2024-08-21

## 2024-08-21 RX ORDER — TAMSULOSIN HYDROCHLORIDE 0.4 MG/1
0.8 CAPSULE ORAL DAILY
Qty: 180 CAPSULE | Refills: 0 | Status: SHIPPED | OUTPATIENT
Start: 2024-08-21

## 2024-08-21 NOTE — PROGRESS NOTES
Last Year: Never true     Ran Out of Food in the Last Year: Never true   Transportation Needs: Unknown (3/28/2024)    PRAPARE - Transportation     Lack of Transportation (Non-Medical): No   Physical Activity: Inactive (9/26/2022)    Exercise Vital Sign     Days of Exercise per Week: 0 days     Minutes of Exercise per Session: 0 min   Stress: No Stress Concern Present (10/2/2019)    Received from [facility], [facility]    [facility] Occup[facility] - Occupational Stress Questionnaire     Feeling of Stress : Only a little   Intimate Partner Violence: Not At Risk (9/26/2022)    Humiliation, Afraid, Rape, and Kick questionnaire     Fear of Current or Ex-Partner: No     Emotionally Abused: No     Physically Abused: No     Sexually Abused: No   Housing Stability: Unknown (3/28/2024)    Housing Stability Vital Sign     Unstable Housing in the Last Year: No            Review of Systems     Denies any current chest pain, abdominal pain, shortness of breath, bleeding     Vitals:    08/21/24 0805   BP: 110/74   Pulse: 55   SpO2: 94%   Weight: 121.6 kg (268 lb)      Body mass index is 37.38 kg/m².   Physical Exam     General: Alert and oriented, cooperative and in no acute distress  Eyes: Clear sclera bl  HEENT: MMM  Cardio: S1, S2 heard, mildly bradycardic, no murmurs appreciated  Resp: No acte respiratory distress, symmetric chest expansion,  CTAB  Abdomen: Soft, non-tender to palpation, non-distended   Neuro: Grossly intact, no focal deficits  MSK: Moves all extremities spontaneously, no acute joint swelling  Skin: Warm and dry, no acute lesions  Psych: Calm, able to answer questions and follow commands       1. Persistent atrial fibrillation (HCC)  -Chronic, stable, rate controlled, follows with cardiology, continue current meds for now    2. Pre-diabetes  -Chronic, stable, will get updated A1c    3. Coronary artery disease involving native heart, unspecified vessel or lesion type, unspecified whether

## 2024-08-29 DIAGNOSIS — Z00.00 HEALTHCARE MAINTENANCE: ICD-10-CM

## 2024-08-29 LAB
ALBUMIN SERPL-MCNC: 4.2 G/DL (ref 3.4–5)
ALBUMIN/GLOB SERPL: 1.4 {RATIO} (ref 1.1–2.2)
ALP SERPL-CCNC: 65 U/L (ref 40–129)
ALT SERPL-CCNC: 11 U/L (ref 10–40)
ANION GAP SERPL CALCULATED.3IONS-SCNC: 10 MMOL/L (ref 3–16)
AST SERPL-CCNC: 14 U/L (ref 15–37)
BASOPHILS # BLD: 0.1 K/UL (ref 0–0.2)
BASOPHILS NFR BLD: 0.8 %
BILIRUB SERPL-MCNC: 0.7 MG/DL (ref 0–1)
BUN SERPL-MCNC: 17 MG/DL (ref 7–20)
CALCIUM SERPL-MCNC: 9.2 MG/DL (ref 8.3–10.6)
CHLORIDE SERPL-SCNC: 103 MMOL/L (ref 99–110)
CHOLEST SERPL-MCNC: 152 MG/DL (ref 0–199)
CO2 SERPL-SCNC: 26 MMOL/L (ref 21–32)
CREAT SERPL-MCNC: 0.9 MG/DL (ref 0.8–1.3)
DEPRECATED RDW RBC AUTO: 19.5 % (ref 12.4–15.4)
EOSINOPHIL # BLD: 0.1 K/UL (ref 0–0.6)
EOSINOPHIL NFR BLD: 1.9 %
EST. AVERAGE GLUCOSE BLD GHB EST-MCNC: 122.6 MG/DL
GFR SERPLBLD CREATININE-BSD FMLA CKD-EPI: >90 ML/MIN/{1.73_M2}
GLUCOSE SERPL-MCNC: 90 MG/DL (ref 70–99)
HBA1C MFR BLD: 5.9 %
HCT VFR BLD AUTO: 34.9 % (ref 40.5–52.5)
HCV AB SERPL QL IA: NORMAL
HDLC SERPL-MCNC: 47 MG/DL (ref 40–60)
HGB BLD-MCNC: 11.6 G/DL (ref 13.5–17.5)
LDLC SERPL CALC-MCNC: 79 MG/DL
LYMPHOCYTES # BLD: 1.7 K/UL (ref 1–5.1)
LYMPHOCYTES NFR BLD: 24.3 %
MCH RBC QN AUTO: 25.6 PG (ref 26–34)
MCHC RBC AUTO-ENTMCNC: 33.3 G/DL (ref 31–36)
MCV RBC AUTO: 77 FL (ref 80–100)
MONOCYTES # BLD: 0.5 K/UL (ref 0–1.3)
MONOCYTES NFR BLD: 6.9 %
NEUTROPHILS # BLD: 4.7 K/UL (ref 1.7–7.7)
NEUTROPHILS NFR BLD: 66.1 %
PLATELET # BLD AUTO: 229 K/UL (ref 135–450)
PMV BLD AUTO: 9.1 FL (ref 5–10.5)
POTASSIUM SERPL-SCNC: 4.5 MMOL/L (ref 3.5–5.1)
PROT SERPL-MCNC: 7.1 G/DL (ref 6.4–8.2)
RBC # BLD AUTO: 4.53 M/UL (ref 4.2–5.9)
SODIUM SERPL-SCNC: 139 MMOL/L (ref 136–145)
T4 FREE SERPL-MCNC: 1.3 NG/DL (ref 0.9–1.8)
TRIGL SERPL-MCNC: 132 MG/DL (ref 0–150)
TSH SERPL DL<=0.005 MIU/L-ACNC: 1.81 UIU/ML (ref 0.27–4.2)
VLDLC SERPL CALC-MCNC: 26 MG/DL
WBC # BLD AUTO: 7.1 K/UL (ref 4–11)

## 2024-11-13 DIAGNOSIS — N39.41 URGE INCONTINENCE OF URINE: ICD-10-CM

## 2024-11-13 DIAGNOSIS — N40.1 BENIGN PROSTATIC HYPERPLASIA WITH URINARY HESITANCY: ICD-10-CM

## 2024-11-13 DIAGNOSIS — R39.11 BENIGN PROSTATIC HYPERPLASIA WITH URINARY HESITANCY: ICD-10-CM

## 2024-11-13 RX ORDER — OXYBUTYNIN CHLORIDE 5 MG/1
5 TABLET, EXTENDED RELEASE ORAL DAILY
Qty: 90 TABLET | Refills: 0 | Status: SHIPPED | OUTPATIENT
Start: 2024-11-13

## 2024-11-13 RX ORDER — TAMSULOSIN HYDROCHLORIDE 0.4 MG/1
0.8 CAPSULE ORAL DAILY
Qty: 180 CAPSULE | Refills: 0 | Status: SHIPPED | OUTPATIENT
Start: 2024-11-13

## 2024-12-04 NOTE — PROGRESS NOTES
[facility]   Electrophysiology  Office Visit  Date: 12/23/2024    Chief Complaint   Patient presents with    Atrial Fibrillation    Atrial Flutter    Tachycardia    Palpitations     Cardiac HX: René Rizo is a 68 y.o. man with a h/o BPH, chronic AF/AFL (2007, [place]), failed propafenone, on Xarelto, echo (12/2022), EF 50-55%, recurrent AF/AFL, c/o TO, abnormal MPI, s/p LHC (3/2023, Dr. Franz) w/ nonobstructive CAD, placed on flecainide, s/p RFA/PVI of AF/tAFL/2atAFL (4/2023, Dr. Forbes), recurrent AF, s/p KEON/DCCV to NSR (7/2023, Dr Rizo).      Interval History/HPI: Patient is here for follow-up for paroxysmal AF/AFL.  Patient was originally diagnosed with AF/AFL in 2007 in [place].  He was initially placed on propafenone and Xarelto however he developed recurrent AF and the propafenone was discontinued.  He had an echo in December 2022 that showed his EF was 50 to 55% with left atrial enlargement.  His EF had been 60% in 2020..  Patient had complained of shortness of breath with exertion and had a stress test that was abnormal.  He underwent a left heart cath in March 2023 that showed nonobstructive CAD.  He was placed on flecainide.  He then underwent an RFA/PVI of AF, typical atrial flutter and 2 atypical atrial flutters in March 2023.  In follow-up he had recurrent AF and underwent a KEON/DCCV to NSR in July 2023.  Today he presents in NSR with a heart rate of 64 bpm.  He has not felt any breakthrough of his atrial fibrillation.  He denies heart racing, palpitations or irregular heartbeats.  He does use his [employer] and checks 3-4 times a week.  He would like to know if he could come off of the flecainide.  He did have a home sleep study and was positive for sleep apnea however he never followed through for his CPAP.  He is not sure if he is interested.  His blood pressure is well-controlled in the office today.  He is talking about retiring in June or July of this year and then eventually moving to

## 2024-12-23 ENCOUNTER — OFFICE VISIT (OUTPATIENT)
Dept: CARDIOLOGY CLINIC | Age: 69
End: 2024-12-23
Payer: COMMERCIAL

## 2024-12-23 VITALS
HEART RATE: 64 BPM | SYSTOLIC BLOOD PRESSURE: 114 MMHG | DIASTOLIC BLOOD PRESSURE: 70 MMHG | WEIGHT: 271 LBS | BODY MASS INDEX: 37.8 KG/M2

## 2024-12-23 DIAGNOSIS — Z79.899 ENCOUNTER FOR MONITORING FLECAINIDE THERAPY: ICD-10-CM

## 2024-12-23 DIAGNOSIS — I48.3 TYPICAL ATRIAL FLUTTER (HCC): ICD-10-CM

## 2024-12-23 DIAGNOSIS — Z51.81 ENCOUNTER FOR MONITORING FLECAINIDE THERAPY: ICD-10-CM

## 2024-12-23 DIAGNOSIS — E78.2 MIXED HYPERLIPIDEMIA: ICD-10-CM

## 2024-12-23 DIAGNOSIS — I48.0 PAROXYSMAL ATRIAL FIBRILLATION (HCC): Primary | ICD-10-CM

## 2024-12-23 DIAGNOSIS — I25.10 CORONARY ARTERY DISEASE INVOLVING NATIVE HEART, UNSPECIFIED VESSEL OR LESION TYPE, UNSPECIFIED WHETHER ANGINA PRESENT: ICD-10-CM

## 2024-12-23 PROCEDURE — 93000 ELECTROCARDIOGRAM COMPLETE: CPT | Performed by: NURSE PRACTITIONER

## 2024-12-23 PROCEDURE — 1123F ACP DISCUSS/DSCN MKR DOCD: CPT | Performed by: NURSE PRACTITIONER

## 2024-12-23 PROCEDURE — 99214 OFFICE O/P EST MOD 30 MIN: CPT | Performed by: NURSE PRACTITIONER

## 2025-01-15 NOTE — TELEPHONE ENCOUNTER
Requested Prescriptions      No prescriptions requested or ordered in this encounter            Checked Correct Pharmacy: Yes    Any changes since last refill? No     Last OV: 12/23/2024 Provider: DIMITRIS    Next OV: 7/1/2025 Provider: JESSIE    Last Labs:   BMP:   Lab Results   Component Value Date/Time     08/29/2024 01:14 PM    K 4.5 08/29/2024 01:14 PM     08/29/2024 01:14 PM    CO2 26 08/29/2024 01:14 PM    BUN 17 08/29/2024 01:14 PM    CREATININE 0.9 08/29/2024 01:14 PM    GLUCOSE 90 08/29/2024 01:14 PM    CALCIUM 9.2 08/29/2024 01:14 PM    LABGLOM >90 08/29/2024 01:14 PM    LABGLOM >60 07/13/2023 07:51 AM

## 2025-02-07 DIAGNOSIS — N40.1 BENIGN PROSTATIC HYPERPLASIA WITH URINARY HESITANCY: ICD-10-CM

## 2025-02-07 DIAGNOSIS — N39.41 URGE INCONTINENCE OF URINE: ICD-10-CM

## 2025-02-07 DIAGNOSIS — R39.11 BENIGN PROSTATIC HYPERPLASIA WITH URINARY HESITANCY: ICD-10-CM

## 2025-02-07 RX ORDER — OXYBUTYNIN CHLORIDE 5 MG/1
5 TABLET, EXTENDED RELEASE ORAL DAILY
Qty: 30 TABLET | Refills: 0 | Status: SHIPPED | OUTPATIENT
Start: 2025-02-07

## 2025-02-07 RX ORDER — TAMSULOSIN HYDROCHLORIDE 0.4 MG/1
0.8 CAPSULE ORAL DAILY
Qty: 60 CAPSULE | Refills: 0 | Status: SHIPPED | OUTPATIENT
Start: 2025-02-07

## 2025-02-07 NOTE — TELEPHONE ENCOUNTER
LV 8/21/24 Butrey  NV not scheduled. Left voicemail and My Chart message letting know a follow up appointment needs to be made.

## 2025-02-09 SDOH — ECONOMIC STABILITY: FOOD INSECURITY: WITHIN THE PAST 12 MONTHS, THE FOOD YOU BOUGHT JUST DIDN'T LAST AND YOU DIDN'T HAVE MONEY TO GET MORE.: NEVER TRUE

## 2025-02-09 SDOH — ECONOMIC STABILITY: INCOME INSECURITY: IN THE LAST 12 MONTHS, WAS THERE A TIME WHEN YOU WERE NOT ABLE TO PAY THE MORTGAGE OR RENT ON TIME?: NO

## 2025-02-09 SDOH — ECONOMIC STABILITY: FOOD INSECURITY: WITHIN THE PAST 12 MONTHS, YOU WORRIED THAT YOUR FOOD WOULD RUN OUT BEFORE YOU GOT MONEY TO BUY MORE.: NEVER TRUE

## 2025-02-09 SDOH — ECONOMIC STABILITY: TRANSPORTATION INSECURITY
IN THE PAST 12 MONTHS, HAS THE LACK OF TRANSPORTATION KEPT YOU FROM MEDICAL APPOINTMENTS OR FROM GETTING MEDICATIONS?: NO

## 2025-02-09 SDOH — ECONOMIC STABILITY: TRANSPORTATION INSECURITY
IN THE PAST 12 MONTHS, HAS LACK OF TRANSPORTATION KEPT YOU FROM MEETINGS, WORK, OR FROM GETTING THINGS NEEDED FOR DAILY LIVING?: NO

## 2025-02-09 ASSESSMENT — PATIENT HEALTH QUESTIONNAIRE - PHQ9
SUM OF ALL RESPONSES TO PHQ QUESTIONS 1-9: 0
1. LITTLE INTEREST OR PLEASURE IN DOING THINGS: NOT AT ALL
SUM OF ALL RESPONSES TO PHQ9 QUESTIONS 1 & 2: 0
2. FEELING DOWN, DEPRESSED OR HOPELESS: NOT AT ALL
SUM OF ALL RESPONSES TO PHQ QUESTIONS 1-9: 0
2. FEELING DOWN, DEPRESSED OR HOPELESS: NOT AT ALL
SUM OF ALL RESPONSES TO PHQ QUESTIONS 1-9: 0
SUM OF ALL RESPONSES TO PHQ9 QUESTIONS 1 & 2: 0
SUM OF ALL RESPONSES TO PHQ QUESTIONS 1-9: 0
1. LITTLE INTEREST OR PLEASURE IN DOING THINGS: NOT AT ALL

## 2025-02-12 ENCOUNTER — OFFICE VISIT (OUTPATIENT)
Dept: FAMILY MEDICINE CLINIC | Age: 70
End: 2025-02-12
Payer: COMMERCIAL

## 2025-02-12 VITALS
HEART RATE: 58 BPM | BODY MASS INDEX: 38.44 KG/M2 | SYSTOLIC BLOOD PRESSURE: 112 MMHG | DIASTOLIC BLOOD PRESSURE: 60 MMHG | WEIGHT: 274.6 LBS | OXYGEN SATURATION: 97 % | HEIGHT: 71 IN

## 2025-02-12 DIAGNOSIS — R39.11 BENIGN PROSTATIC HYPERPLASIA WITH URINARY HESITANCY: ICD-10-CM

## 2025-02-12 DIAGNOSIS — N40.1 BENIGN PROSTATIC HYPERPLASIA WITH URINARY HESITANCY: ICD-10-CM

## 2025-02-12 DIAGNOSIS — N39.41 URGE INCONTINENCE OF URINE: ICD-10-CM

## 2025-02-12 DIAGNOSIS — I48.19 PERSISTENT ATRIAL FIBRILLATION (HCC): Primary | ICD-10-CM

## 2025-02-12 PROCEDURE — 1123F ACP DISCUSS/DSCN MKR DOCD: CPT | Performed by: STUDENT IN AN ORGANIZED HEALTH CARE EDUCATION/TRAINING PROGRAM

## 2025-02-12 PROCEDURE — 99214 OFFICE O/P EST MOD 30 MIN: CPT | Performed by: STUDENT IN AN ORGANIZED HEALTH CARE EDUCATION/TRAINING PROGRAM

## 2025-02-12 RX ORDER — TAMSULOSIN HYDROCHLORIDE 0.4 MG/1
0.8 CAPSULE ORAL DAILY
Qty: 180 CAPSULE | Refills: 0 | Status: SHIPPED | OUTPATIENT
Start: 2025-02-12

## 2025-02-12 RX ORDER — OXYBUTYNIN CHLORIDE 5 MG/1
5 TABLET, EXTENDED RELEASE ORAL DAILY
Qty: 90 TABLET | Refills: 0 | Status: SHIPPED | OUTPATIENT
Start: 2025-02-12

## 2025-02-12 NOTE — PROGRESS NOTES
Chief Complaint   Patient presents with    Medication Check     Medication follow up-no concerns           HPI: René Rizo is a 69 y.o. male who presents for FUV    #Afib  #Urge incontinence  -Is on oxybutynin 5 mg daily, tamsulosin 0.4 mg daily, metoprolol succinate 100 mg daily, atorvastatin 10 mg nightly, aspirin 81 mg daily, follows with cardiology, labs below, BP well controlled today , states that symptoms are well controlled     Lab Results   Component Value Date    CREATININE 0.9 08/29/2024    BUN 17 08/29/2024     08/29/2024    K 4.5 08/29/2024     08/29/2024    CO2 26 08/29/2024        Past Medical History:   Diagnosis Date    Atrial fibrillation (HCC)     BPH (benign prostatic hyperplasia)     Class 2 severe obesity due to excess calories with serious comorbidity and body mass index (BMI) of 37.0 to 37.9 in adult 10/25/2023    Hyperlipidemia     Urge incontinence        Past Surgical History:   Procedure Laterality Date    ACHILLES TENDON SURGERY Left     ACHILLES TENDON SURGERY Right 9/29/2022    RIGHT ACHILLES SECONDAY RECONSTRUCTION WITH CALCANEUS EXCISION performed by Yodit Yin MD at [facility] OR    CARDIOVERSION  07/13/2023    CERVICAL SPINE SURGERY      2 2 level fusions    COLONOSCOPY N/A 5/1/2024    COLONOSCOPY, POSSIBLE POLYPECTOMY performed by Taurus Hines MD at [facility] ENDOSCOPY    EYE SURGERY  9/2021    SuperK right eye    REFRACTIVE SURGERY Bilateral       Current Outpatient Medications   Medication Sig Dispense Refill    tamsulosin (FLOMAX) 0.4 MG capsule Take 2 capsules by mouth daily 180 capsule 0    oxyBUTYnin (DITROPAN-XL) 5 MG extended release tablet Take 1 tablet by mouth daily 90 tablet 0    atorvastatin (LIPITOR) 10 MG tablet TAKE 1 TABLET BY MOUTH EVERY DAY 90 tablet 0    ASPIRIN LOW DOSE 81 MG EC tablet TAKE 1 TABLET BY MOUTH EVERY DAY 90 tablet 1    metoprolol succinate (TOPROL XL) 100 MG extended release tablet TAKE 1 TABLET BY MOUTH TWICE A

## 2025-03-11 RX ORDER — ATORVASTATIN CALCIUM 10 MG/1
TABLET, FILM COATED ORAL
Qty: 90 TABLET | Refills: 3 | Status: SHIPPED | OUTPATIENT
Start: 2025-03-11

## 2025-03-11 NOTE — TELEPHONE ENCOUNTER
Requested Prescriptions     Pending Prescriptions Disp Refills    atorvastatin (LIPITOR) 10 MG tablet [Pharmacy Med Name: ATORVASTATIN 10 MG TABLET] 90 tablet 3     Sig: TAKE 1 TABLET BY MOUTH EVERY DAY            Checked Correct Pharmacy: Yes  Any changes since last refill? No     Number: 90  Refills: 3      Last OV: 12/23/2024 Provider: DIMITRIS  Next OV: 4/22/2025 Provider: JOHNY        Last Labs: 08/29/2025      CMP:   Lab Results   Component Value Date     08/29/2024    K 4.5 08/29/2024     08/29/2024    CO2 26 08/29/2024    BUN 17 08/29/2024    CREATININE 0.9 08/29/2024    GLUCOSE 90 08/29/2024    CALCIUM 9.2 08/29/2024    BILITOT 0.7 08/29/2024    ALKPHOS 65 08/29/2024    AST 14 (L) 08/29/2024    ALT 11 08/29/2024    LABGLOM >90 08/29/2024    GFRAA >60 05/12/2022    AGRATIO 1.4 08/29/2024          Lipid:   Lab Results   Component Value Date    CHOL 152 08/29/2024    TRIG 132 08/29/2024    HDL 47 08/29/2024    LDL 79 08/29/2024    VLDL 26 08/29/2024

## 2025-04-15 RX ORDER — METOPROLOL SUCCINATE 100 MG/1
100 TABLET, EXTENDED RELEASE ORAL 2 TIMES DAILY
Qty: 180 TABLET | Refills: 3 | Status: SHIPPED | OUTPATIENT
Start: 2025-04-15

## 2025-04-21 NOTE — PROGRESS NOTES
[facility]     Outpatient Cardiology         Patient Name:  René Rizo  Requesting Physician: No admitting provider for patient encounter.  Primary Care Physician: Mayo Decker MD    Reason for Consultation/Chief Complaint:   Chief Complaint   Patient presents with    Atrial Fibrillation     Hx ablation and cardioversion     History of Present Illness:    HPI     René Rizois a 69 y.o. male with PMH of BPH, chronic AF/AFL (2007, [place]), failed propafenone, on Xarelto, echo (12/2022), EF 50-55%, recurrent AF/AFL, c/o TO, abnormal MPI, s/p LHC (3/2023, Dr. Franz) w/ nonobstructive CAD, placed on flecainide, s/p RFA/PVI of AF/tAFL/2atAFL (4/2023, Dr. Forbes), recurrent AF, s/p KEON/DCCV to NSR (7/2023, Dr Rizo).    Here to establish care for pAF, CAD, HLD. Former Dr. Franz patient.   pAF, s/p DCCV on toprol 100 mg BID, no recurrence, remains off anticoagulation.  Asymptomatic overall.  CAD, nonocclusive, on aspirin and Lipitor, no angina.  HLD, Last LDL 79 (8/28/24) on liptior 10 mg daily, LDL pretty much at goal, no side effects.      PMH  Past Medical History:   Diagnosis Date    Atrial fibrillation (HCC)     BPH (benign prostatic hyperplasia)     Class 2 severe obesity due to excess calories with serious comorbidity and body mass index (BMI) of 37.0 to 37.9 in adult 10/25/2023    Hyperlipidemia     Urge incontinence        PSH  Past Surgical History:   Procedure Laterality Date    ACHILLES TENDON SURGERY Left     ACHILLES TENDON SURGERY Right 9/29/2022    RIGHT ACHILLES SECONDAY RECONSTRUCTION WITH CALCANEUS EXCISION performed by Yodit Yin MD at [facility] OR    CARDIOVERSION  07/13/2023    CERVICAL SPINE SURGERY      2 2 level fusions    COLONOSCOPY N/A 5/1/2024    COLONOSCOPY, POSSIBLE POLYPECTOMY performed by Taurus Hines MD at [facility] ENDOSCOPY    EYE SURGERY  9/2021    SuperK right eye    REFRACTIVE SURGERY Bilateral         Social HIstory  Social History     Tobacco Use

## 2025-04-22 ENCOUNTER — OFFICE VISIT (OUTPATIENT)
Dept: CARDIOLOGY CLINIC | Age: 70
End: 2025-04-22
Payer: COMMERCIAL

## 2025-04-22 VITALS
SYSTOLIC BLOOD PRESSURE: 124 MMHG | BODY MASS INDEX: 37.85 KG/M2 | HEART RATE: 62 BPM | DIASTOLIC BLOOD PRESSURE: 78 MMHG | WEIGHT: 271.4 LBS

## 2025-04-22 DIAGNOSIS — E78.2 MIXED HYPERLIPIDEMIA: ICD-10-CM

## 2025-04-22 DIAGNOSIS — I25.10 NON-OCCLUSIVE CORONARY ARTERY DISEASE: Primary | ICD-10-CM

## 2025-04-22 DIAGNOSIS — I48.0 PAROXYSMAL ATRIAL FIBRILLATION (HCC): Chronic | ICD-10-CM

## 2025-04-22 PROCEDURE — 99214 OFFICE O/P EST MOD 30 MIN: CPT | Performed by: INTERNAL MEDICINE

## 2025-04-22 PROCEDURE — 1123F ACP DISCUSS/DSCN MKR DOCD: CPT | Performed by: INTERNAL MEDICINE

## 2025-04-22 RX ORDER — METOPROLOL SUCCINATE 100 MG/1
100 TABLET, EXTENDED RELEASE ORAL 2 TIMES DAILY
Qty: 180 TABLET | Refills: 3 | Status: SHIPPED | OUTPATIENT
Start: 2025-04-22

## 2025-04-22 RX ORDER — ATORVASTATIN CALCIUM 10 MG/1
10 TABLET, FILM COATED ORAL DAILY
Qty: 90 TABLET | Refills: 3 | Status: SHIPPED | OUTPATIENT
Start: 2025-04-22

## 2025-04-22 ASSESSMENT — ENCOUNTER SYMPTOMS
ABDOMINAL DISTENTION: 0
WHEEZING: 0
EYE DISCHARGE: 0
ABDOMINAL PAIN: 0
SHORTNESS OF BREATH: 0
COUGH: 0
COLOR CHANGE: 0
BLOOD IN STOOL: 0
VOMITING: 0
CHEST TIGHTNESS: 0
FACIAL SWELLING: 0
BACK PAIN: 0

## 2025-04-22 NOTE — ASSESSMENT & PLAN NOTE
No recurrence so far since last cardioversion.  Off anticoagulation, thus to spot checks with [employer].  Continue metoprolol

## 2025-05-16 DIAGNOSIS — N40.1 BENIGN PROSTATIC HYPERPLASIA WITH URINARY HESITANCY: ICD-10-CM

## 2025-05-16 DIAGNOSIS — R39.11 BENIGN PROSTATIC HYPERPLASIA WITH URINARY HESITANCY: ICD-10-CM

## 2025-05-16 DIAGNOSIS — N39.41 URGE INCONTINENCE OF URINE: ICD-10-CM

## 2025-05-16 RX ORDER — TAMSULOSIN HYDROCHLORIDE 0.4 MG/1
0.8 CAPSULE ORAL DAILY
Qty: 180 CAPSULE | Refills: 0 | Status: SHIPPED | OUTPATIENT
Start: 2025-05-16

## 2025-05-16 RX ORDER — OXYBUTYNIN CHLORIDE 5 MG/1
5 TABLET, EXTENDED RELEASE ORAL DAILY
Qty: 90 TABLET | Refills: 0 | Status: SHIPPED | OUTPATIENT
Start: 2025-05-16

## 2025-06-04 NOTE — PROGRESS NOTES
[facility]   Cardiac Electrophysiology Follow   Date: 7/1/2025    Chief Complaint:   Chief Complaint   Patient presents with    Atrial Fibrillation      HPI: René Rizo is a 69 y.o. male with PMH significant for BPH, chronic AF/AFL (2007, in [place]), failed propafenone, on Xarelto. Echo (12/2022) showed EF 50-55% with LAE (EF had been 60% in 2020). Following abnormal Lexiscan (12/2022), s/p LHC (3/2023) showed nonobstructive CAD, started on flecainide, s/p RFA/PVI of AF/tAFL/atAFL (4/12/23, myself). S/p KEON/DCCV (7/13/23). Now off Eliquis and flecainide.    Interval History:   He is here for 6 mo f/u, presenting in [place]. He has been feeling well overall for the past 6 months, denies recurrence of AF. He uses Kardia twice a week and has not seen any AF.    He is the  at [employer], making [employer].    Assessment:  Longstanding persistent AF, s/p AF ablation (7/2023), on ASA, Toprol  Mild CMP, recovered EF 55-60%  BPH, on Flomax  CAD, nonobstructive, on ASA, statin  MR, mod-severe per KEON, follows Dr. Rizo  Obesity, morbid    Plan:  No changes today  Continue to f/u with Dr. Rizo  Follow up with EP NP in 6 months    Atrial Fibrillation:    BMI    :   Body mass index is 37.94 kg/m².    Duration   :   Longstanding, 2006    Symptoms   :   Easy fatigability, dyspnea on exertion, decline in his functional capacity    Previous AAD             :   propafenone 1/2020 - 12/2022         Flecainide started 3/2023    Beta blocker  :   Toprol    ACE / ARB  :   none    OAC   :   Xarelto    LVEF    :   55-60%    Left atrial size  :   4.6 cm    JOSEP   :   negative    Past Medical History:   Diagnosis Date    Atrial fibrillation (HCC)     BPH (benign prostatic hyperplasia)     Class 2 severe obesity due to excess calories with serious comorbidity and body mass index (BMI) of 37.0 to 37.9 in adult (HCC) 10/25/2023    Hyperlipidemia     Urge incontinence         Past Surgical History:   Procedure Laterality Date    ACHILLES

## 2025-06-11 ENCOUNTER — RESULTS FOLLOW-UP (OUTPATIENT)
Dept: FAMILY MEDICINE CLINIC | Age: 70
End: 2025-06-11

## 2025-06-11 ENCOUNTER — OFFICE VISIT (OUTPATIENT)
Dept: FAMILY MEDICINE CLINIC | Age: 70
End: 2025-06-11
Payer: COMMERCIAL

## 2025-06-11 ENCOUNTER — HOSPITAL ENCOUNTER (OUTPATIENT)
Dept: GENERAL RADIOLOGY | Age: 70
Discharge: HOME | End: 2025-06-11
Payer: COMMERCIAL

## 2025-06-11 VITALS
BODY MASS INDEX: 38.33 KG/M2 | SYSTOLIC BLOOD PRESSURE: 100 MMHG | HEIGHT: 71 IN | WEIGHT: 273.8 LBS | HEART RATE: 65 BPM | TEMPERATURE: 98.1 F | OXYGEN SATURATION: 93 % | DIASTOLIC BLOOD PRESSURE: 60 MMHG

## 2025-06-11 DIAGNOSIS — G89.29 CHRONIC PAIN OF BOTH SHOULDERS: ICD-10-CM

## 2025-06-11 DIAGNOSIS — M25.522 PAIN OF BOTH ELBOWS: ICD-10-CM

## 2025-06-11 DIAGNOSIS — M25.511 CHRONIC PAIN OF BOTH SHOULDERS: ICD-10-CM

## 2025-06-11 DIAGNOSIS — M25.521 PAIN OF BOTH ELBOWS: ICD-10-CM

## 2025-06-11 DIAGNOSIS — M25.50 ARTHRALGIA, UNSPECIFIED JOINT: ICD-10-CM

## 2025-06-11 DIAGNOSIS — N40.1 BENIGN PROSTATIC HYPERPLASIA WITH URINARY HESITANCY: ICD-10-CM

## 2025-06-11 DIAGNOSIS — M25.512 CHRONIC PAIN OF BOTH SHOULDERS: ICD-10-CM

## 2025-06-11 DIAGNOSIS — R39.11 BENIGN PROSTATIC HYPERPLASIA WITH URINARY HESITANCY: ICD-10-CM

## 2025-06-11 DIAGNOSIS — N39.41 URGE INCONTINENCE OF URINE: Primary | ICD-10-CM

## 2025-06-11 PROCEDURE — 73030 X-RAY EXAM OF SHOULDER: CPT

## 2025-06-11 PROCEDURE — 1123F ACP DISCUSS/DSCN MKR DOCD: CPT | Performed by: STUDENT IN AN ORGANIZED HEALTH CARE EDUCATION/TRAINING PROGRAM

## 2025-06-11 PROCEDURE — 99214 OFFICE O/P EST MOD 30 MIN: CPT | Performed by: STUDENT IN AN ORGANIZED HEALTH CARE EDUCATION/TRAINING PROGRAM

## 2025-06-11 PROCEDURE — 73080 X-RAY EXAM OF ELBOW: CPT

## 2025-06-11 RX ORDER — ACETAMINOPHEN 500 MG
500 TABLET ORAL 4 TIMES DAILY PRN
Qty: 120 TABLET | Refills: 5 | Status: SHIPPED | OUTPATIENT
Start: 2025-06-11

## 2025-06-11 RX ORDER — OXYBUTYNIN CHLORIDE 10 MG/1
10 TABLET, EXTENDED RELEASE ORAL DAILY
Qty: 90 TABLET | Refills: 0 | Status: SHIPPED | OUTPATIENT
Start: 2025-06-11
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
## 2025-06-13 DIAGNOSIS — N39.41 URGE INCONTINENCE OF URINE: ICD-10-CM

## 2025-06-13 DIAGNOSIS — N40.1 BENIGN PROSTATIC HYPERPLASIA WITH URINARY HESITANCY: ICD-10-CM

## 2025-06-13 DIAGNOSIS — R39.11 BENIGN PROSTATIC HYPERPLASIA WITH URINARY HESITANCY: ICD-10-CM

## 2025-06-13 LAB
ALBUMIN SERPL-MCNC: 3.9 G/DL (ref 3.4–5)
ALBUMIN/GLOB SERPL: 1.4 {RATIO} (ref 1.1–2.2)
ALP SERPL-CCNC: 61 U/L (ref 40–129)
ALT SERPL-CCNC: 12 U/L (ref 10–40)
ANION GAP SERPL CALCULATED.3IONS-SCNC: 8 MMOL/L (ref 3–16)
AST SERPL-CCNC: 16 U/L (ref 15–37)
BASOPHILS # BLD: 0 K/UL (ref 0–0.2)
BASOPHILS NFR BLD: 0.4 %
BILIRUB SERPL-MCNC: 1 MG/DL (ref 0–1)
BUN SERPL-MCNC: 19 MG/DL (ref 7–20)
CALCIUM SERPL-MCNC: 8.9 MG/DL (ref 8.3–10.6)
CHLORIDE SERPL-SCNC: 105 MMOL/L (ref 99–110)
CHOLEST SERPL-MCNC: 146 MG/DL (ref 0–199)
CO2 SERPL-SCNC: 27 MMOL/L (ref 21–32)
CREAT SERPL-MCNC: 0.7 MG/DL (ref 0.8–1.3)
DEPRECATED RDW RBC AUTO: 15.2 % (ref 12.4–15.4)
EOSINOPHIL # BLD: 0.1 K/UL (ref 0–0.6)
EOSINOPHIL NFR BLD: 1.8 %
EST. AVERAGE GLUCOSE BLD GHB EST-MCNC: 114 MG/DL
GFR SERPLBLD CREATININE-BSD FMLA CKD-EPI: >90 ML/MIN/{1.73_M2}
GLUCOSE SERPL-MCNC: 91 MG/DL (ref 70–99)
HBA1C MFR BLD: 5.6 %
HCT VFR BLD AUTO: 37.1 % (ref 40.5–52.5)
HDLC SERPL-MCNC: 43 MG/DL (ref 40–60)
HGB BLD-MCNC: 12.8 G/DL (ref 13.5–17.5)
LDLC SERPL CALC-MCNC: 73 MG/DL
LYMPHOCYTES # BLD: 1.8 K/UL (ref 1–5.1)
LYMPHOCYTES NFR BLD: 25.6 %
MCH RBC QN AUTO: 29.5 PG (ref 26–34)
MCHC RBC AUTO-ENTMCNC: 34.4 G/DL (ref 31–36)
MCV RBC AUTO: 85.9 FL (ref 80–100)
MONOCYTES # BLD: 0.5 K/UL (ref 0–1.3)
MONOCYTES NFR BLD: 7 %
NEUTROPHILS # BLD: 4.7 K/UL (ref 1.7–7.7)
NEUTROPHILS NFR BLD: 65.2 %
PLATELET # BLD AUTO: 211 K/UL (ref 135–450)
PMV BLD AUTO: 8.7 FL (ref 5–10.5)
POTASSIUM SERPL-SCNC: 4.5 MMOL/L (ref 3.5–5.1)
PROT SERPL-MCNC: 6.6 G/DL (ref 6.4–8.2)
PSA SERPL DL<=0.01 NG/ML-MCNC: 1.18 NG/ML (ref 0–4)
RBC # BLD AUTO: 4.32 M/UL (ref 4.2–5.9)
SODIUM SERPL-SCNC: 140 MMOL/L (ref 136–145)
T4 FREE SERPL-MCNC: 1.2 NG/DL (ref 0.9–1.8)
TRIGL SERPL-MCNC: 150 MG/DL (ref 0–150)
TSH SERPL DL<=0.005 MIU/L-ACNC: 1.81 UIU/ML (ref 0.27–4.2)
VLDLC SERPL CALC-MCNC: 30 MG/DL
WBC # BLD AUTO: 7.2 K/UL (ref 4–11)

## 2025-06-23 ENCOUNTER — APPOINTMENT (OUTPATIENT)
Dept: PHYSICAL THERAPY | Age: 70
End: 2025-06-23
Attending: STUDENT IN AN ORGANIZED HEALTH CARE EDUCATION/TRAINING PROGRAM
Payer: COMMERCIAL

## 2025-06-26 ENCOUNTER — OFFICE VISIT (OUTPATIENT)
Dept: FAMILY MEDICINE CLINIC | Age: 70
End: 2025-06-26
Payer: COMMERCIAL

## 2025-06-26 VITALS
WEIGHT: 270.4 LBS | OXYGEN SATURATION: 95 % | SYSTOLIC BLOOD PRESSURE: 120 MMHG | BODY MASS INDEX: 37.85 KG/M2 | HEART RATE: 69 BPM | HEIGHT: 71 IN | DIASTOLIC BLOOD PRESSURE: 70 MMHG

## 2025-06-26 DIAGNOSIS — M25.50 ARTHRALGIA, UNSPECIFIED JOINT: Primary | ICD-10-CM

## 2025-06-26 PROCEDURE — 99214 OFFICE O/P EST MOD 30 MIN: CPT | Performed by: STUDENT IN AN ORGANIZED HEALTH CARE EDUCATION/TRAINING PROGRAM

## 2025-06-26 PROCEDURE — 1123F ACP DISCUSS/DSCN MKR DOCD: CPT | Performed by: STUDENT IN AN ORGANIZED HEALTH CARE EDUCATION/TRAINING PROGRAM

## 2025-06-26 RX ORDER — MELOXICAM 7.5 MG/1
7.5 TABLET ORAL DAILY PRN
Qty: 90 TABLET | Refills: 0 | Status: SHIPPED | OUTPATIENT
Start: 2025-06-26

## 2025-06-26 NOTE — PROGRESS NOTES
after discussing risks and benefits, will start meloxicam 7.5 mg daily PRN while keeping a very close eye on blood pressure, signs of bleeding, kidney function with close follow-up to repeat RFP, also advised patient to start physical therapy which should also greatly help with symptom  - meloxicam (MOBIC) 7.5 MG tablet; Take 1 tablet by mouth daily as needed for Pain  Dispense: 90 tablet; Refill: 0       Return in about 6 weeks (around 8/7/2025) for Follow up Joint Pain .       This note was at least partially created using voice recognition software and may contain speech and/or medical errors.  For any questions please contact me directly  Mayo Decker MD

## 2025-06-27 ENCOUNTER — HOSPITAL ENCOUNTER (OUTPATIENT)
Dept: PHYSICAL THERAPY | Age: 70
Setting detail: THERAPIES SERIES
Discharge: HOME | End: 2025-06-27
Attending: STUDENT IN AN ORGANIZED HEALTH CARE EDUCATION/TRAINING PROGRAM
Payer: COMMERCIAL

## 2025-06-27 DIAGNOSIS — M25.511 RIGHT SHOULDER PAIN, UNSPECIFIED CHRONICITY: Primary | ICD-10-CM

## 2025-06-27 DIAGNOSIS — M62.89 MUSCLE TIGHTNESS: ICD-10-CM

## 2025-06-27 DIAGNOSIS — R53.1 WEAKNESS: ICD-10-CM

## 2025-06-27 DIAGNOSIS — M25.512 LEFT SHOULDER PAIN, UNSPECIFIED CHRONICITY: ICD-10-CM

## 2025-06-27 PROCEDURE — 97161 PT EVAL LOW COMPLEX 20 MIN: CPT | Performed by: PHYSICAL THERAPIST

## 2025-06-27 PROCEDURE — 97110 THERAPEUTIC EXERCISES: CPT | Performed by: PHYSICAL THERAPIST

## 2025-06-27 NOTE — PLAN OF CARE
[facility] - Outpatient Rehabilitation and Therapy: [address], [address], [place] [address] office: [phone] fax: [phone]     Physical Therapy Initial Evaluation Certification      Dear Mayo Decker MD ,    We had the pleasure of evaluating the following patient for physical therapy services at [facility] Outpatient Physical Therapy.  A summary of our findings can be found in the initial assessment below.  This includes our plan of care.  If you have any questions or concerns regarding these findings, please do not hesitate to contact me at the office phone number listed above.  Thank you for the referral.     Physician Signature:_______________________________Date:__________________  By signing above (or electronic signature), therapist’s plan is approved by physician       Physical Therapy: TREATMENT/PROGRESS NOTE   Patient: René Rizo (69 y.o. male)   Examination Date: 2025   :  1955 MRN: [id number]   Visit #: 1   Insurance Allowable Auth Needed   25; estimates provided ($200-$289/visit) []Yes    [x]No    Insurance: Payor: UMR / Plan: UMR / Product Type: *No Product type* /   Insurance ID: [id number] - (Commercial)  Secondary Insurance (if applicable):    Treatment Diagnosis:     ICD-10-CM    1. Right shoulder pain, unspecified chronicity  M25.511       2. Left shoulder pain, unspecified chronicity  M25.512       3. Weakness  R53.1          Medical Diagnosis:  Arthralgia, unspecified joint [M25.50]  Bilateral shoulder pain [M25.511, M25.512]   Referring Physician: Mayo Decker MD  PCP: Mayo Decker MD     Plan of care signed (Y/N): pending    Date of Patient follow up with Physician: JONAH     Plan of Care Report: EVAL today  POC update due: (10 visits /OR AUTH LIMITS, whichever is less)  2025                                             Medical History:  Comorbidities:  Osteoarthritis  Relevant Medical History: cspine fusions 2007 & 2017;

## 2025-07-01 ENCOUNTER — OFFICE VISIT (OUTPATIENT)
Dept: CARDIOLOGY CLINIC | Age: 70
End: 2025-07-01
Payer: COMMERCIAL

## 2025-07-01 VITALS
SYSTOLIC BLOOD PRESSURE: 110 MMHG | WEIGHT: 272 LBS | DIASTOLIC BLOOD PRESSURE: 72 MMHG | HEART RATE: 59 BPM | BODY MASS INDEX: 37.94 KG/M2

## 2025-07-01 DIAGNOSIS — E78.2 MIXED HYPERLIPIDEMIA: ICD-10-CM

## 2025-07-01 DIAGNOSIS — E66.01 CLASS 2 SEVERE OBESITY DUE TO EXCESS CALORIES WITH SERIOUS COMORBIDITY AND BODY MASS INDEX (BMI) OF 37.0 TO 37.9 IN ADULT (HCC): ICD-10-CM

## 2025-07-01 DIAGNOSIS — I48.11 LONGSTANDING PERSISTENT ATRIAL FIBRILLATION (HCC): Primary | ICD-10-CM

## 2025-07-01 DIAGNOSIS — E66.812 CLASS 2 SEVERE OBESITY DUE TO EXCESS CALORIES WITH SERIOUS COMORBIDITY AND BODY MASS INDEX (BMI) OF 37.0 TO 37.9 IN ADULT (HCC): ICD-10-CM

## 2025-07-01 DIAGNOSIS — I34.0 NONRHEUMATIC MITRAL VALVE REGURGITATION: ICD-10-CM

## 2025-07-01 DIAGNOSIS — I25.10 NON-OCCLUSIVE CORONARY ARTERY DISEASE: ICD-10-CM

## 2025-07-01 PROCEDURE — 1123F ACP DISCUSS/DSCN MKR DOCD: CPT | Performed by: INTERNAL MEDICINE

## 2025-07-01 PROCEDURE — 93000 ELECTROCARDIOGRAM COMPLETE: CPT | Performed by: INTERNAL MEDICINE

## 2025-07-01 PROCEDURE — 99214 OFFICE O/P EST MOD 30 MIN: CPT | Performed by: INTERNAL MEDICINE

## 2025-07-01 PROCEDURE — G2211 COMPLEX E/M VISIT ADD ON: HCPCS | Performed by: INTERNAL MEDICINE

## 2025-07-02 ENCOUNTER — APPOINTMENT (OUTPATIENT)
Dept: PHYSICAL THERAPY | Age: 70
End: 2025-07-02
Attending: STUDENT IN AN ORGANIZED HEALTH CARE EDUCATION/TRAINING PROGRAM
Payer: COMMERCIAL

## 2025-07-09 ENCOUNTER — HOSPITAL ENCOUNTER (OUTPATIENT)
Dept: PHYSICAL THERAPY | Age: 70
Setting detail: THERAPIES SERIES
Discharge: HOME | End: 2025-07-09
Attending: STUDENT IN AN ORGANIZED HEALTH CARE EDUCATION/TRAINING PROGRAM
Payer: COMMERCIAL

## 2025-07-09 PROCEDURE — 97110 THERAPEUTIC EXERCISES: CPT | Performed by: SPECIALIST/TECHNOLOGIST

## 2025-07-09 NOTE — FLOWSHEET NOTE
range of motion, maintain or improve muscular strength or increase flexibility, following either an injury or surgery.     GOALS     Patient stated goal: Eliminate shoulder and elbow pain.   [] Progressing: [] Met: [] Not Met: [] Adjusted    Therapist goals for Patient:   Short Term Goals: To be achieved in: 2 weeks  Independent in HEP and progression per patient tolerance, in order to prevent re-injury.   [] Progressing: [] Met: [] Not Met: [] Adjusted  Patient will have a decrease in pain to <5/10 max to facilitate improvement in movement, function, and ADLs as indicated by Functional Deficits.  [] Progressing: [] Met: [] Not Met: [] Adjusted    Long Term Goals: To be achieved in: 8 weeks  Disability index score of 15% or less for the Upper Extremity functional Scale to assist with reaching prior level of function with activities such as reaching into upper cabines.  [] Progressing: [] Met: [] Not Met: [] Adjusted  Patient will demonstrate increased AROM of L shoulder ER to 90 deg (in 90 abd) without pain to allow for proper joint functioning to enable patient to dress without compensation.   [] Progressing: [] Met: [] Not Met: [] Adjusted  Patient will demonstrate increased Strength of ER to at least 4+/5 throughout without pain to allow for proper functional mobility to enable patient to return to lifting 5 lbs above shoulder height.   [] Progressing: [] Met: [] Not Met: [] Adjusted  Patient will return to household chores without increased symptoms or restriction.   [] Progressing: [] Met: [] Not Met: [] Adjusted  Patient will be able to sleep for 6 hours without awaking because of pain. (patient specific functional goal)    [] Progressing: [] Met: [] Not Met: [] Adjusted       Overall Progression Towards Functional goals/ Treatment Progress Update:  [] Patient is progressing as expected towards functional goals listed.    [] Progression is slowed due to complexities/Impairments listed.  [] Progression has been

## 2025-07-17 ENCOUNTER — HOSPITAL ENCOUNTER (OUTPATIENT)
Dept: PHYSICAL THERAPY | Age: 70
Setting detail: THERAPIES SERIES
Discharge: HOME | End: 2025-07-17
Attending: STUDENT IN AN ORGANIZED HEALTH CARE EDUCATION/TRAINING PROGRAM
Payer: COMMERCIAL

## 2025-07-17 PROCEDURE — 97112 NEUROMUSCULAR REEDUCATION: CPT

## 2025-07-17 PROCEDURE — 97110 THERAPEUTIC EXERCISES: CPT

## 2025-07-17 NOTE — FLOWSHEET NOTE
[facility] - Outpatient Rehabilitation and Therapy: [address], [place] [address] office: [phone] fax: [phone]           Physical Therapy: TREATMENT/PROGRESS NOTE   Patient: René Rizo (69 y.o. male)   Examination Date: 2025   :  1955 MRN: [id number]   Visit #: 3   Insurance Allowable Auth Needed   25; estimates provided ($200-$289/visit) []Yes    [x]No    Insurance: Payor: UMR / Plan: UMR / Product Type: *No Product type* /   Insurance ID: [id number] - (Commercial)  Secondary Insurance (if applicable):    Treatment Diagnosis:     ICD-10-CM    1. Right shoulder pain, unspecified chronicity  M25.511       2. Left shoulder pain, unspecified chronicity  M25.512       3. Weakness  R53.1          Medical Diagnosis:  Arthralgia, unspecified joint [M25.50]  Bilateral shoulder pain [M25.511, M25.512]   Referring Physician: Mayo Decker MD  PCP: Mayo Decker MD     Plan of care signed (Y/N): pending    Date of Patient follow up with Physician: JONAH     Plan of Care Report: NO  POC update due: (10 visits /OR AUTH LIMITS, whichever is less)  2025                                             Medical History:  Comorbidities:  Osteoarthritis  Relevant Medical History: cspine fusions  & ; achilles repair ; heart ablation                                          Precautions/ Contra-indications:           Latex allergy:  NO  Pacemaker:    NO  Contraindications for Manipulation: None and remote history of spinal fusion (relative)  Date of Surgery: n/a  Other:    Red Flags:  None    Suicide Screening:   The patient did not verbalize a primary behavioral concern, suicidal ideation, suicidal intent, or demonstrate suicidal behaviors.    Preferred Language for Healthcare:   [x] English       [] other:    SUBJECTIVE EXAMINATION     Patient stated complaint: Pt reports continued improvement of symptoms that seem to be isolated in R GHJ. Pt notes

## 2025-07-24 ENCOUNTER — HOSPITAL ENCOUNTER (OUTPATIENT)
Dept: PHYSICAL THERAPY | Age: 70
Setting detail: THERAPIES SERIES
Discharge: HOME | End: 2025-07-24
Attending: STUDENT IN AN ORGANIZED HEALTH CARE EDUCATION/TRAINING PROGRAM
Payer: COMMERCIAL

## 2025-07-24 PROCEDURE — 97110 THERAPEUTIC EXERCISES: CPT | Performed by: PHYSICAL THERAPIST

## 2025-07-24 NOTE — THERAPY DISCHARGE
Adjusted  Patient will return to household chores without increased symptoms or restriction.   [] Progressing: [x] Met: [] Not Met: [] Adjusted  Patient will be able to sleep for 6 hours without awaking because of pain. (patient specific functional goal)    [] Progressing: [x] Met: [] Not Met: [] Adjusted       Overall Progression Towards Functional goals/ Treatment Progress Update:  [x] Patient is progressing as expected towards functional goals listed.    [] Progression is slowed due to complexities/Impairments listed.  [] Progression has been slowed due to co-morbidities.  [] Plan just implemented, too soon (<30days) to assess goals progression   [] Goals require adjustment due to lack of progress  [] Patient is not progressing as expected and requires additional follow up with physician  [] Other:     TREATMENT PLAN       Plan: Discharge    Electronically Signed by GUI Rogers  Date: 07/24/2025   Note: Portions of this note have been templated and/or copied from initial evaluation, reassessments and prior notes for documentation efficiency.  Note: If patient does not return for scheduled/recommended follow up visits, this note will serve as a discharge from care along with the most recent update on progress.    Ortho Evaluation

## 2025-07-31 ENCOUNTER — APPOINTMENT (OUTPATIENT)
Dept: PHYSICAL THERAPY | Age: 70
End: 2025-07-31
Attending: STUDENT IN AN ORGANIZED HEALTH CARE EDUCATION/TRAINING PROGRAM
Payer: COMMERCIAL

## 2025-08-04 ENCOUNTER — OFFICE VISIT (OUTPATIENT)
Dept: FAMILY MEDICINE CLINIC | Age: 70
End: 2025-08-04
Payer: COMMERCIAL

## 2025-08-04 VITALS
BODY MASS INDEX: 37.69 KG/M2 | DIASTOLIC BLOOD PRESSURE: 68 MMHG | WEIGHT: 269.2 LBS | OXYGEN SATURATION: 98 % | SYSTOLIC BLOOD PRESSURE: 106 MMHG | TEMPERATURE: 97.2 F | HEIGHT: 71 IN | HEART RATE: 59 BPM

## 2025-08-04 DIAGNOSIS — R39.11 BENIGN PROSTATIC HYPERPLASIA WITH URINARY HESITANCY: ICD-10-CM

## 2025-08-04 DIAGNOSIS — M25.50 ARTHRALGIA, UNSPECIFIED JOINT: ICD-10-CM

## 2025-08-04 DIAGNOSIS — N39.41 URGE INCONTINENCE OF URINE: ICD-10-CM

## 2025-08-04 DIAGNOSIS — N40.1 BENIGN PROSTATIC HYPERPLASIA WITH URINARY HESITANCY: ICD-10-CM

## 2025-08-04 DIAGNOSIS — E78.5 HYPERLIPIDEMIA, UNSPECIFIED HYPERLIPIDEMIA TYPE: Primary | ICD-10-CM

## 2025-08-04 PROCEDURE — 1123F ACP DISCUSS/DSCN MKR DOCD: CPT | Performed by: STUDENT IN AN ORGANIZED HEALTH CARE EDUCATION/TRAINING PROGRAM

## 2025-08-04 PROCEDURE — 99214 OFFICE O/P EST MOD 30 MIN: CPT | Performed by: STUDENT IN AN ORGANIZED HEALTH CARE EDUCATION/TRAINING PROGRAM

## 2025-08-04 RX ORDER — TAMSULOSIN HYDROCHLORIDE 0.4 MG/1
0.8 CAPSULE ORAL DAILY
Qty: 180 CAPSULE | Refills: 0 | Status: SHIPPED | OUTPATIENT
Start: 2025-08-04

## 2025-08-04 RX ORDER — OXYBUTYNIN CHLORIDE 10 MG/1
10 TABLET, EXTENDED RELEASE ORAL DAILY
Qty: 90 TABLET | Refills: 0 | Status: SHIPPED | OUTPATIENT
Start: 2025-08-04

## 2025-08-04 RX ORDER — MELOXICAM 7.5 MG/1
7.5 TABLET ORAL DAILY PRN
Qty: 90 TABLET | Refills: 0 | Status: SHIPPED | OUTPATIENT
Start: 2025-08-04

## 2025-08-04 RX ORDER — ATORVASTATIN CALCIUM 10 MG/1
10 TABLET, FILM COATED ORAL DAILY
Qty: 90 TABLET | Refills: 0 | Status: SHIPPED | OUTPATIENT
Start: 2025-08-04

## (undated) DEVICE — SOLUTION IRRIG 500ML 0.9% SOD CHL USP POUR PLAS BTL

## (undated) DEVICE — SUTURE MCRYL + SZ 4-0 L27IN ABSRB UD L19MM PS-2 3/8 CIR MCP426H

## (undated) DEVICE — GLOVE SURG SZ 65 L12IN THK75MIL DK GRN LTX FREE

## (undated) DEVICE — BANDAGE COMPR W6INXL12FT SMOOTH FOR LIMB EXSANG ESMARCH

## (undated) DEVICE — GAUZE,SPONGE,4"X4",8PLY,STRL,LF,10/TRAY: Brand: MEDLINE

## (undated) DEVICE — SUTURE VCRL + SZ 3-0 L18IN ABSRB UD SH 1/2 CIR TAPERCUT NDL VCP864D

## (undated) DEVICE — PADDING CAST W4INXL4YD ST COT RAYON MICROPLEATED HIGHLY

## (undated) DEVICE — PADDING CAST W6INXL4YD NONSTERILE COT RAYON MICROPLEATED

## (undated) DEVICE — POSITIONER HD SFT TCH BERRY FOAM DEVON

## (undated) DEVICE — SUTURE VCRL + SZ 2-0 L18IN ABSRB UD CT1 L36MM 1/2 CIR VCP839D

## (undated) DEVICE — ELECTRODE PT RET AD L9FT HI MOIST COND ADH HYDRGEL CORDED

## (undated) DEVICE — DRESSING,GAUZE,XEROFORM,CURAD,1"X8",ST: Brand: CURAD

## (undated) DEVICE — NEEDLE HYPO 22GA L1.5IN BLK POLYPR HUB S STL REG BVL STR

## (undated) DEVICE — T-DRAPE,EXTREMITY,STERILE: Brand: MEDLINE

## (undated) DEVICE — TOWEL,OR,DSP,ST,BLUE,STD,4/PK,20PK/CS: Brand: MEDLINE

## (undated) DEVICE — MASC TURNOVER KIT: Brand: MEDLINE INDUSTRIES, INC.

## (undated) DEVICE — ARM CRADLE: Brand: DEVON

## (undated) DEVICE — PACK PROCEDURE SURG EXTREMITY MFFOP CUST

## (undated) DEVICE — GLOVE SURG SZ 65 THK91MIL LTX FREE SYN POLYISOPRENE

## (undated) DEVICE — BANDAGE,ELASTIC,ESMARK,STERILE,6"X9',LF: Brand: MEDLINE

## (undated) DEVICE — GLOVE ORANGE PI 8   MSG9080

## (undated) DEVICE — PRECISION THIN (7.0 X 0.38 X 29.5MM)

## (undated) DEVICE — SYRINGE IRRIG 60ML SFT PLIABLE BLB EZ TO GRP 1 HND USE W/

## (undated) DEVICE — GLOVE ORTHO 8   MSG9480

## (undated) DEVICE — CRADLE POS 3X5X24IN RASPBERRY ARM PRONE FOAM DISP

## (undated) DEVICE — MASTISOL ADHESIVE LIQ 2/3ML

## (undated) DEVICE — INTENDED FOR TISSUE SEPARATION, AND OTHER PROCEDURES THAT REQUIRE A SHARP SURGICAL BLADE TO PUNCTURE OR CUT.: Brand: BARD-PARKER ® STAINLESS STEEL BLADES

## (undated) DEVICE — APPLICATOR MEDICATED 26 CC SOLUTION HI LT ORNG CHLORAPREP

## (undated) DEVICE — DRAPE,U/ SHT,SPLIT,PLAS,STERIL: Brand: MEDLINE

## (undated) DEVICE — BANDAGE COMPR W6INXL10YD ST M E WHITE/BEIGE

## (undated) DEVICE — STRIP,CLOSURE,WOUND,MEDI-STRIP,1/2X4: Brand: MEDLINE

## (undated) DEVICE — MEDICINE CUP, GRADUATED, STER: Brand: MEDLINE